# Patient Record
Sex: FEMALE | Race: BLACK OR AFRICAN AMERICAN | Employment: OTHER | URBAN - METROPOLITAN AREA
[De-identification: names, ages, dates, MRNs, and addresses within clinical notes are randomized per-mention and may not be internally consistent; named-entity substitution may affect disease eponyms.]

---

## 2018-02-08 ENCOUNTER — OFFICE VISIT (OUTPATIENT)
Dept: FAMILY MEDICINE CLINIC | Age: 63
End: 2018-02-08

## 2018-02-08 VITALS
WEIGHT: 172 LBS | TEMPERATURE: 96.6 F | HEART RATE: 85 BPM | DIASTOLIC BLOOD PRESSURE: 55 MMHG | HEIGHT: 62 IN | SYSTOLIC BLOOD PRESSURE: 126 MMHG | BODY MASS INDEX: 31.65 KG/M2 | OXYGEN SATURATION: 95 % | RESPIRATION RATE: 18 BRPM

## 2018-02-08 DIAGNOSIS — Z72.0 TOBACCO ABUSE: ICD-10-CM

## 2018-02-08 DIAGNOSIS — R19.4 CHANGE IN BOWEL HABITS: ICD-10-CM

## 2018-02-08 DIAGNOSIS — E78.5 DYSLIPIDEMIA: ICD-10-CM

## 2018-02-08 DIAGNOSIS — Z71.89 ACP (ADVANCE CARE PLANNING): ICD-10-CM

## 2018-02-08 DIAGNOSIS — K21.9 GASTROESOPHAGEAL REFLUX DISEASE, ESOPHAGITIS PRESENCE NOT SPECIFIED: ICD-10-CM

## 2018-02-08 DIAGNOSIS — Z00.00 ENCOUNTER FOR MEDICARE ANNUAL WELLNESS EXAM: ICD-10-CM

## 2018-02-08 DIAGNOSIS — J45.991 COUGH VARIANT ASTHMA: Primary | ICD-10-CM

## 2018-02-08 DIAGNOSIS — Z11.59 NEED FOR HEPATITIS C SCREENING TEST: ICD-10-CM

## 2018-02-08 DIAGNOSIS — K92.0 HEMATEMESIS, PRESENCE OF NAUSEA NOT SPECIFIED: ICD-10-CM

## 2018-02-08 DIAGNOSIS — I10 ESSENTIAL HYPERTENSION: ICD-10-CM

## 2018-02-08 RX ORDER — ATORVASTATIN CALCIUM 20 MG/1
20 TABLET, FILM COATED ORAL DAILY
Qty: 30 TAB | Refills: 2 | COMMUNITY
Start: 2018-02-08 | End: 2018-08-13

## 2018-02-08 RX ORDER — ASPIRIN 81 MG/1
81 TABLET ORAL
COMMUNITY

## 2018-02-08 RX ORDER — MONTELUKAST SODIUM 10 MG/1
10 TABLET ORAL DAILY
Qty: 30 TAB | Refills: 0 | Status: SHIPPED | OUTPATIENT
Start: 2018-02-08 | End: 2018-10-02

## 2018-02-08 NOTE — PROGRESS NOTES
Chief Complaint   Patient presents with   [facility]    Chest Pain     on and off chest pain (burning sensation)    Annual Wellness Visit    Constipation     1. Have you been to the ER, urgent care clinic since your last visit? Hospitalized since your last visit? No    2. Have you seen or consulted any other health care providers outside of the [address] [place] [address] since your last visit? Include any pap smears or colon screening. No        This is an Initial Medicare Annual Wellness Exam (AWV) (Performed 12 months after IPPE or effective date of Medicare Part B enrollment, Once in a lifetime)    I have reviewed the patient's medical history in detail and updated the computerized patient record. History   Marbin London comes in for Medicare wellness visit. Past Medical History:   Diagnosis Date    Acid reflux     Anemia     COPD (chronic obstructive pulmonary disease) (HCC)     Cystic disease of kidney     GERD (gastroesophageal reflux disease)     Goiter     Hemorrhoid     History of blood transfusion     HTN (hypertension)     Hyperlipidemia     Hypertension     Renal cyst       Past Surgical History:   Procedure Laterality Date    HX COLONOSCOPY      HX ENDOSCOPY      HX HEENT  2011    Partial thyroid    HX HYSTERECTOMY      HX ORTHOPAEDIC  2005    left shoulder sugery    HX PARTIAL HYSTERECTOMY  1990    HX THYROIDECTOMY       Current Outpatient Prescriptions   Medication Sig Dispense Refill    aspirin delayed-release 81 mg tablet 81 mg.      multivitamin (ONE A DAY) tablet Take 1 Tab by mouth daily.  pravastatin (PRAVACHOL) 10 mg tablet Take  by mouth nightly.  albuterol (PROVENTIL HFA, VENTOLIN HFA, PROAIR HFA) 90 mcg/actuation inhaler Take  by inhalation.  omeprazole (PRILOSEC) 20 mg capsule Take 20 mg by mouth daily.  lisinopril (PRINIVIL, ZESTRIL) 10 mg tablet Take  by mouth daily.       cholecalciferol (VITAMIN D3) 1,000 unit tablet Take  by mouth daily. Allergies   Allergen Reactions    Latex, Natural Rubber Rash     Family History   Problem Relation Age of Onset    Diabetes Mother     Hypertension Mother    [facility] Cancer Father     Hypertension Father      Social History   Substance Use Topics    Smoking status: Current Every Day Smoker     Packs/day: 0.30     Years: 27.00     Types: Cigarettes    Smokeless tobacco: Never Used    Alcohol use No     Patient Active Problem List   Diagnosis Code    HTN (hypertension) I10    Vomiting R11.10    Hemorrhoids K64.9    Back pain M54.9    GERD (gastroesophageal reflux disease) K21.9    H/O thyroid nodule Z86.39    Renal cyst, left N28.1       Depression Risk Factor Screening:     PHQ over the last two weeks 2/8/2018   Little interest or pleasure in doing things Not at all   Feeling down, depressed or hopeless Not at all   Total Score PHQ 2 0     Alcohol Risk Factor Screening: You do not drink alcohol or very rarely. Functional Ability and Level of Safety:   1. Was the patient's timed Up and GO test unsteady or longer than 30 seconds? No  2. Does the patient need help with the phone, transportation, shopping, preparing meals, housework, laundry, medications or managing money? No  3. Does the patients' home have rugs in the hallway, lack grab bars in the bathroom, lack handrails on the stairs or have poor lighting? No  4. Have you noticed any hearing difficulties? No  Hearing Evaluation:    Hearing Loss  Hearing is good. Activities of Daily Living  The home contains: no safety equipment. Patient does total self care    Fall Risk  Fall Risk Assessment, last 12 mths 2/8/2018   Able to walk? Yes   Fall in past 12 months?  No       Abuse Screen  Patient is not abused    Cognitive Screening   Evaluation of Cognitive Function:  Has your family/caregiver stated any concerns about your memory: no  Normal    Patient Care Team   Patient Care Team:  Angelica Rincon MD as PCP - General (Family Practice)  Lizbeth Marroquin MD (Family Practice)    Assessment/Plan   Education and counseling provided:  Are appropriate based on today's review and evaluation  End-of-Life planning (with patient's consent)  Pneumococcal Vaccine  Screening Mammography  Screening Pap and pelvic (covered once every 2 years)  Colorectal cancer screening tests  Cardiovascular screening blood test  Bone mass measurement (DEXA)  Screening for glaucoma  Diabetes screening test    1. Encounter for Medicare annual wellness exam    2. ACP (advance care planning)    3. Need for hepatitis C screening test  - HEPATITIS C AB; Future    Health Maintenance Due   Topic Date Due    Hepatitis C Screening  1955    Pneumococcal 19-64 Medium Risk (1 of 1 - PPSV23) 01/27/1973    DTaP/Tdap/Td series (1 - Tdap) 01/27/1975    PAP AKA CERVICAL CYTOLOGY  01/27/1975    FOBT Q 1 YEAR AGE 50-75  01/27/2004    ZOSTER VACCINE AGE 60>  11/27/2013   I have discussed the diagnosis with the patient and the intended plan of care as seen in the above orders. The patient has received an after-visit summary and questions were answered concerning future plans. I have discussed medication, side effects, and warnings with the patient in detail. The patient verbalized understanding and is in agreement with the plan of care. The patient will contact the office with any additional concerns. Personalized preventative plan of care was discussed, printed and given to patient.     Kuldip Ramirez MD

## 2018-02-08 NOTE — PATIENT INSTRUCTIONS
Medicare Part B Preventive Services Limitations Recommendation Scheduled   Bone Mass Measurement  (age 72 & older, biennial) Requires diagnosis related to osteoporosis or estrogen deficiency. Biennial benefit unless patient has history of long-term glucocorticoid tx or baseline is needed because initial test was by other method Due    Cardiovascular Screening Blood Tests (every 5 years)  Total cholesterol, HDL, Triglycerides Order as a panel if possible Due    Colorectal Cancer Screening  -Fecal occult blood test (annual)  -Flexible sigmoidoscopy (5y)  -Screening colonoscopy (10y)  -Barium Enema  Due Per Patient-Colonoscopy scheduled for next week with [facility]   Counseling to Prevent Tobacco Use (up to 8 sessions per year)  - Counseling greater than 3 and up to 10 minutes  - Counseling greater than 10 minutes Patients must be asymptomatic of tobacco-related conditions to receive as preventive service Patient is a current everyday smoker. Counseling provided by pcp at this visit. Diabetes Screening Tests (at least every 3 years, Medicare covers annually or at 6-month intervals for prediabetic patients)    Fasting blood sugar (FBS) or glucose tolerance test (GTT) Patient must be diagnosed with one of the following:  -Hypertension, Dyslipidemia, obesity, previous impaired FBS or GTT  Or any two of the following: overweight, FH of diabetes, age ? 72, history of gestational diabetes, birth of baby weighing more than 9 pounds Due    Diabetes Self-Management Training (DSMT) (no USPSTF recommendation) Requires referral by treating physician for patient with diabetes or renal disease. 10 hours of initial DSMT session of no less than 30 minutes each in a continuous 12-month period. 2 hours of follow-up DSMT in subsequent years.  N/A    Glaucoma Screening (no USPSTF recommendation) Diabetes mellitus, family history, , age 48 or over,  American, age 72 or over Due    Human Immunodeficiency Virus (HIV) Screening (annually for increased risk patients)  HIV-1 and HIV-2 by EIA, NATALY, rapid antibody test, or oral mucosa transudate Patient must be at increased risk for HIV infection per USPSTF guidelines or pregnant. Tests covered annually for patients at increased risk. Pregnant patients may receive up to 3 test during pregnancy. N/A    Medical Nutrition Therapy (MNT) (for diabetes or renal disease not recommended schedule) Requires referral by treating physician for patient with diabetes or renal disease. Can be provided in same year as diabetes self-management training (DSMT), and CMS recommends medical nutrition therapy take place after DSMT. Up to 3 hours for initial year and 2 hours in subsequent years. N/A    Shingles Vaccination A shingles vaccine is also recommended once in a lifetime after age 61 Due    Seasonal Influenza Vaccination (annually)  UTD    Pneumococcal Vaccination (once after 72)  Due    Hepatitis B Vaccinations (if medium/high risk) Medium/high risk factors:  End-stage renal disease,  Hemophiliacs who received Factor VIII or IX concentrates, Clients of institutions for the mentally retarded, Persons who live in the same house as a HepB virus carrier, Homosexual men, Illicit injectable drug abusers. N/A    Screening Mammography (biennial age 54-69) Annually (age 36 or over) Due    Screening Pap Tests and Pelvic Examination (up to age 79 and after 79 if unknown history or abnormal study last 10 years) Every 25 months except high risk Due    Ultrasound Screening for Abdominal Aortic Aneurysm (AAA) (once) Patient must be referred through [facility] and not have had a screening for abdominal aortic aneurysm before under Medicare.   Limited to patients who meet one of the following criteria:  - Men who are 73-68 years old and have smoked more than 100 cigarettes in their lifetime.  -Anyone with a FH of AAA  -Anyone recommended for screening by USPSTF N/A

## 2018-02-08 NOTE — PROGRESS NOTES
FLORINDA Ontiveros comes in to establish care. Hypertension: Patient has a previous history of hypertension. She was started on lisinopril but medication had to be gradually cut back as she was getting hypotensive episodes. She has not taken her blood pressure medications for more than 3 days. She was on lisinopril 2.5 mg daily. Her blood pressure today is stable. We discussed blood pressure control and medications. She would want to be off medications for now and do lifestyle modification and keep a blood pressure log. She will come in for follow-up in a month. If blood pressure is elevated she is willing to restart medication but for now she would prefer to stay off it. Cough: Patient has cough, shortness of breath and occasional wheeze. She is scheduled to see a pulmonologist for testing which includes PFTs and radiological tests. She does states that a few weeks ago she was seen by a provider and a chest x-ray was done that showed features of pneumonia. She was put on antibiotic. Symptoms have improved though she still has wheezing at bedtime and occasional chest tightness with pleuritic chest discomfort. No diaphoresis. Pain does not radiate. She is using her inhaler medication. She has albuterol. I will add Singulair to take daily. She can take over-the-counter cough medication for symptom relief as needed. Hematemesis: Patient does state that she has had several episodes of hematemesis. This has been ongoing for months. It is triggered by meals. She does have occasional epigastric discomfort. Denies weight loss. She is taking Prilosec. I will refer her to the gastroenterologist given she is having episodes of hematemesis. She will need to have an EGD done. We will check labs today. Last time she had an episode of hematemesis was 2 days ago. She denies dizziness or palpitations. Change in bowel habits: Patient has noted change in her bowel habits.   States that this has been ongoing for years. Her stools are infrequent and at times she does get gas and bloating. She is scheduled to have colonoscopy done in a few weeks. We will get the report and her previous records. She will discuss this with the gastroenterologist.  Tobacco abuse: Patient is a smoker. Has smoked for more than 40 years. We discussed the need to quit smoking and that she is aware of the dangers of tobacco use. She is not ready to quit at the moment. She does have family member who  of lung cancer thus she states that she is well aware that she needs to quit smoking. We will continue to encourage her to quit. She does not want any nicotine replacement therapy or medications. Past Medical History  Past Medical History:   Diagnosis Date    Acid reflux     Anemia     COPD (chronic obstructive pulmonary disease) (HCC)     Cystic disease of kidney     GERD (gastroesophageal reflux disease)     Goiter     Hemorrhoid     History of blood transfusion     HTN (hypertension)     Hyperlipidemia     Hypertension     Renal cyst        Surgical History  Past Surgical History:   Procedure Laterality Date    HX COLONOSCOPY      HX ENDOSCOPY      HX HEENT      Partial thyroid    HX HYSTERECTOMY      HX ORTHOPAEDIC      left shoulder sugery    HX PARTIAL HYSTERECTOMY      HX THYROIDECTOMY          Medications  Current Outpatient Prescriptions   Medication Sig Dispense Refill    aspirin delayed-release 81 mg tablet 81 mg.      atorvastatin (LIPITOR) 20 mg tablet Take 1 Tab by mouth daily. 30 Tab 2    montelukast (SINGULAIR) 10 mg tablet Take 1 Tab by mouth daily. 30 Tab 0    multivitamin (ONE A DAY) tablet Take 1 Tab by mouth daily.  albuterol (PROVENTIL HFA, VENTOLIN HFA, PROAIR HFA) 90 mcg/actuation inhaler Take  by inhalation.  omeprazole (PRILOSEC) 20 mg capsule Take 20 mg by mouth daily.  cholecalciferol (VITAMIN D3) 1,000 unit tablet Take  by mouth daily. Allergies  Allergies   Allergen Reactions    Latex, Natural Rubber Rash       Family History  Family History   Problem Relation Age of Onset    Diabetes Mother     Hypertension Mother     Cancer Father     Hypertension Father        Social History  Social History     Social History    Marital status: SINGLE     Spouse name: N/A    Number of children: N/A    Years of education: N/A     Occupational History    Not on file.      Social History Main Topics    Smoking status: Current Every Day Smoker     Packs/day: 0.30     Years: 27.00     Types: Cigarettes    Smokeless tobacco: Never Used    Alcohol use No    Drug use: No    Sexual activity: No     Other Topics Concern    Not on file     Social History Narrative    ** Merged History Encounter **            Review of Systems  Review of Systems - History obtained from the patient  General ROS: negative for - chills or fever  Psychological ROS: positive for - concentration difficulties  ENT ROS: negative  Allergy and Immunology ROS: positive for - seasonal allergies  Hematological and Lymphatic ROS: negative  Endocrine ROS: negative  Respiratory ROS: positive for - cough, pleuritic pain, shortness of breath and wheezing  negative for - hemoptysis or sputum changes  Cardiovascular ROS: no chest pain or dyspnea on exertion  Gastrointestinal ROS: positive for - change in bowel habits, constipation and hematemesis  Genito-Urinary ROS: no dysuria, trouble voiding, or hematuria  Musculoskeletal ROS: negative  Neurological ROS: no TIA or stroke symptoms    Vital Signs  Visit Vitals    /55 (BP 1 Location: Left arm, BP Patient Position: Sitting)    Pulse 85    Temp 96.6 °F (35.9 °C) (Oral)    Resp 18    Ht 5' 2\" (1.575 m)    Wt 172 lb (78 kg)    LMP 05/08/1981    SpO2 95%    BMI 31.46 kg/m2         Physical Exam  Physical Examination: General appearance - oriented to person, place, and time, acyanotic, in no respiratory distress and well hydrated  Mental status - alert, oriented to person, place, and time, affect appropriate to mood  Eyes - sclera anicteric  Nose - normal and patent, no erythema, discharge or polyps  Mouth - mucous membranes moist, pharynx normal without lesions  Neck - supple, no significant adenopathy  Lymphatics - no palpable lymphadenopathy, no hepatosplenomegaly  Chest - clear to auscultation, no wheezes, rales or rhonchi, symmetric air entry, no tachypnea, retractions or cyanosis  Heart - normal rate and regular rhythm, S1 and S2 normal  Abdomen - soft, nontender, nondistended, no masses or organomegaly  bowel sounds normal  Neurological - alert, oriented, normal speech, no focal findings or movement disorder noted, motor and sensory grossly normal bilaterally  Musculoskeletal - no muscular tenderness noted  Extremities - no pedal edema noted, intact peripheral pulses    Results  Results for orders placed or performed in visit on 05/23/16   AMB POC URINALYSIS DIP STICK AUTO W/ MICRO (MICRO RESULTS)   Result Value Ref Range    Color (UA POC) Yellow     Clarity (UA POC) Clear     Glucose (UA POC) Negative Negative    Bilirubin (UA POC) Negative Negative    Ketones (UA POC) Negative Negative    Specific gravity (UA POC) 1.020 1.001 - 1.035    Blood (UA POC) Trace Negative    pH (UA POC) 6.5 4.6 - 8.0    Protein (UA POC) Negative Negative mg/dL    Urobilinogen (UA POC) 0.2 mg/dL 0.2 - 1    Nitrites (UA POC) Negative Negative    Leukocyte esterase (UA POC) Negative Negative    Epithelial cells (UA POC)      WBCs (UA POC) n     RBCs (UA POC) n     Bacteria (UA POC) n Negative    Crystals (UA POC)  Negative    Other (UA POC)         ASSESSMENT and PLAN  1. Cough variant asthma  - montelukast (SINGULAIR) 10 mg tablet; Take 1 Tab by mouth daily. Dispense: 30 Tab; Refill: 0    2. Essential hypertension  - CBC WITH AUTOMATED DIFF; Future  - METABOLIC PANEL, COMPREHENSIVE; Future    3.  Dyslipidemia  - atorvastatin (LIPITOR) 20 mg tablet; Take 1 Tab by mouth daily. Dispense: 30 Tab; Refill: 2  - LIPID PANEL; Future    4. Need for hepatitis C screening test  - HEPATITIS C AB; Future    5. Tobacco abuse    6. Hematemesis, presence of nausea not specified  - REFERRAL TO GASTROENTEROLOGY    7. Gastroesophageal reflux disease, esophagitis presence not specified  - REFERRAL TO GASTROENTEROLOGY    lab results and schedule of future lab studies reviewed with patient  reviewed diet, exercise and weight control  very strongly urged to quit smoking to reduce cardiovascular risk  cardiovascular risk and specific lipid/LDL goals reviewed  reviewed medications and side effects in detail  use of aspirin to prevent MI and TIA's discussed    I have discussed the diagnosis with the patient and the intended plan of care as seen in the above orders. The patient has received an after-visit summary and questions were answered concerning future plans. I have discussed medication, side effects, and warnings with the patient in detail. The patient verbalized understanding and is in agreement with the plan of care. The patient will contact the office with any additional concerns.     Megan Whitehead MD

## 2018-02-08 NOTE — MR AVS SNAPSHOT
[facility] [address] [facility] 
[phone] Patient: Marbin London MRN: [id number] XAK:[id number]/62/0442 Visit Information Date & Time Provider Department Dept. Phone Encounter #  
 2/8/2018 10:00 AM Robbie Graff MD [facility] [phone] [id number] Follow-up Instructions Return in about 1 month (around 3/8/2018), or if symptoms worsen or fail to improve, for htn, gerd, dyslipidemia. Upcoming Health Maintenance Date Due Hepatitis C Screening 1955 Pneumococcal 19-64 Medium Risk (1 of 1 - PPSV23) 1/27/1973 DTaP/Tdap/Td series (1 - Tdap) 1/27/1975 PAP AKA CERVICAL CYTOLOGY 1/27/1975 FOBT Q 1 YEAR AGE 50-75 1/27/2004 ZOSTER VACCINE AGE 60> 11/27/2013 BREAST CANCER SCRN MAMMOGRAM 11/28/2018 Allergies as of 2/8/2018  Review Complete On: 2/8/2018 By: Bean Gong MD  
  
 Severity Noted Reaction Type Reactions Latex, Natural Rubber  07/16/2013   Side Effect Rash Current Immunizations  Reviewed on 1/15/2014 Name Date Influenza Vaccine 1/8/2014  8:24 AM  
  
 Not reviewed this visit You Were Diagnosed With   
  
 Codes Comments Cough variant asthma    -  Primary ICD-10-CM: S39.891 ICD-9-CM: 991.46 Essential hypertension     ICD-10-CM: I10 
ICD-9-CM: 401.9 Dyslipidemia     ICD-10-CM: E78.5 ICD-9-CM: 272.4 Need for hepatitis C screening test     ICD-10-CM: Z11.59 
ICD-9-CM: V73.89 Tobacco abuse     ICD-10-CM: Z72.0 ICD-9-CM: 305.1 Hematemesis, presence of nausea not specified     ICD-10-CM: K92.0 ICD-9-CM: 578.0 Gastroesophageal reflux disease, esophagitis presence not specified     ICD-10-CM: K21.9 ICD-9-CM: 530.81 Change in bowel habits     ICD-10-CM: R19.4 ICD-9-CM: 787.99 Vitals BP Pulse Temp Resp Height(growth percentile) Weight(growth percentile) 126/55 (BP 1 Location: Left arm, BP Patient Position: Sitting) 85 96.6 °F (35.9 °C) (Oral) 18 5' 2\" (1.575 m) 172 lb (78 kg) LMP SpO2 BMI OB Status Smoking Status 05/08/1981 95% 31.46 kg/m2 Hysterectomy Current Every Day Smoker Vitals History BMI and BSA Data Body Mass Index Body Surface Area  
 31.46 kg/m 2 1.85 m 2 Preferred Pharmacy Pharmacy Name Phone [address], [facility] Your Updated Medication List  
  
   
This list is accurate as of: 2/8/18 10:50 AM.  Always use your most recent med list.  
  
  
  
  
 albuterol 90 mcg/actuation inhaler Commonly known as:  PROVENTIL HFA, VENTOLIN HFA, PROAIR HFA Take  by inhalation. aspirin delayed-release 81 mg tablet 81 mg.  
  
 atorvastatin 20 mg tablet Commonly known as:  LIPITOR Take 1 Tab by mouth daily. montelukast 10 mg tablet Commonly known as:  SINGULAIR Take 1 Tab by mouth daily. multivitamin tablet Commonly known as:  ONE A DAY Take 1 Tab by mouth daily. omeprazole 20 mg capsule Commonly known as:  PRILOSEC Take 20 mg by mouth daily. VITAMIN D3 1,000 unit tablet Generic drug:  cholecalciferol Take  by mouth daily. Prescriptions Sent to Pharmacy Refills  
 montelukast (SINGULAIR) 10 mg tablet 0 Sig: Take 1 Tab by mouth daily. Class: Normal  
 Pharmacy: [facility] [address] [facility] [address] [place] [address] [address] Ph #: [phone] Route: Oral  
  
We Performed the Following REFERRAL TO GASTROENTEROLOGY [UDN94 Custom] Comments:  
 Patient with hematemesis on and off. Follow-up Instructions Return in about 1 month (around 3/8/2018), or if symptoms worsen or fail to improve, for htn, gerd, dyslipidemia. To-Do List   
 02/08/2018 Lab:  CBC WITH AUTOMATED DIFF   
  
 02/08/2018 Lab:  HEPATITIS C AB   
  
 02/08/2018 Lab:  LIPID PANEL   
  
 02/08/2018 Lab: METABOLIC PANEL, COMPREHENSIVE Referral Information Referral ID Referred By Referred To  
  
 [id number] Dakota Vallecillo N Not Available Visits Status Start Date End Date 1 New Request 2/8/18 2/8/19 If your referral has a status of pending review or denied, additional information will be sent to support the outcome of this decision. Patient Instructions Medicare Part B Preventive Services Limitations Recommendation Scheduled Bone Mass Measurement 
(age 72 & older, biennial) Requires diagnosis related to osteoporosis or estrogen deficiency. Biennial benefit unless patient has history of long-term glucocorticoid tx or baseline is needed because initial test was by other method Due   
Cardiovascular Screening Blood Tests (every 5 years) Total cholesterol, HDL, Triglycerides Order as a panel if possible Due Colorectal Cancer Screening 
-Fecal occult blood test (annual) -Flexible sigmoidoscopy (5y) 
-Screening colonoscopy (10y) -Barium Enema  Due Per Patient-Colonoscopy scheduled for next week with [facility]  
Counseling to Prevent Tobacco Use (up to 8 sessions per year) - Counseling greater than 3 and up to 10 minutes - Counseling greater than 10 minutes Patients must be asymptomatic of tobacco-related conditions to receive as preventive service Patient is a current everyday smoker. Counseling provided by pcp at this visit. Diabetes Screening Tests (at least every 3 years, Medicare covers annually or at 6-month intervals for prediabetic patients) Fasting blood sugar (FBS) or glucose tolerance test (GTT) Patient must be diagnosed with one of the following: 
-Hypertension, Dyslipidemia, obesity, previous impaired FBS or GTT 
Or any two of the following: overweight, FH of diabetes, age ? 72, history of gestational diabetes, birth of baby weighing more than 9 pounds Due Diabetes Self-Management Training (DSMT) (no USPSTF recommendation) Requires referral by treating physician for patient with diabetes or renal disease. 10 hours of initial DSMT session of no less than 30 minutes each in a continuous 12-month period. 2 hours of follow-up DSMT in subsequent years. N/A Glaucoma Screening (no USPSTF recommendation) Diabetes mellitus, family history, , age 48 or over,  American, age 72 or over Due Human Immunodeficiency Virus (HIV) Screening (annually for increased risk patients) HIV-1 and HIV-2 by EIA, NATALY, rapid antibody test, or oral mucosa transudate Patient must be at increased risk for HIV infection per USPSTF guidelines or pregnant. Tests covered annually for patients at increased risk. Pregnant patients may receive up to 3 test during pregnancy. N/A Medical Nutrition Therapy (MNT) (for diabetes or renal disease not recommended schedule) Requires referral by treating physician for patient with diabetes or renal disease. Can be provided in same year as diabetes self-management training (DSMT), and CMS recommends medical nutrition therapy take place after DSMT. Up to 3 hours for initial year and 2 hours in subsequent years. N/A Shingles Vaccination A shingles vaccine is also recommended once in a lifetime after age 61 Due Seasonal Influenza Vaccination (annually)  UTD Pneumococcal Vaccination (once after 65)  Due   
Hepatitis B Vaccinations (if medium/high risk) Medium/high risk factors:  End-stage renal disease, Hemophiliacs who received Factor VIII or IX concentrates, Clients of institutions for the mentally retarded, Persons who live in the same house as a HepB virus carrier, Homosexual men, Illicit injectable drug abusers. N/A Screening Mammography (biennial age 54-69) Annually (age 36 or over) Due   
Screening Pap Tests and Pelvic Examination (up to age 79 and after 79 if unknown history or abnormal study last 10 years) Every 24 months except high risk Due   
 Ultrasound Screening for Abdominal Aortic Aneurysm (AAA) (once) Patient must be referred through IPPE and not have had a screening for abdominal aortic aneurysm before under Medicare. Limited to patients who meet one of the following criteria: 
- Men who are 73-68 years old and have smoked more than 100 cigarettes in their lifetime. 
-Anyone with a FH of AAA 
-Anyone recommended for screening by USPSTF N/A Introducing [facility]! Medardo Piña introduces [employer] patient portal. Now you can access parts of your medical record, email your doctor's office, and request medication refills online. 1. In your internet browser, go to [email or web address] 2. Click on the First Time User? Click Here link in the Sign In box. You will see the New Member Sign Up page. 3. Enter your [employer] Access Code exactly as it appears below. You will not need to use this code after youve completed the sign-up process. If you do not sign up before the expiration date, you must request a new code. · [employer] Access Code: [email or web address] Expires: 5/9/2018 10:50 AM 
 
4. Enter the last four digits of your Social Security Number (xxxx) and Date of Birth (mm/dd/yyyy) as indicated and click Submit. You will be taken to the next sign-up page. 5. Create a [employer] ID. This will be your [employer] login ID and cannot be changed, so think of one that is secure and easy to remember. 6. Create a [employer] password. You can change your password at any time. 7. Enter your Password Reset Question and Answer. This can be used at a later time if you forget your password. 8. Enter your e-mail address. You will receive e-mail notification when new information is available in [address] 9. Click Sign Up. You can now view and download portions of your medical record. 10. Click the Download Summary menu link to download a portable copy of your medical information. If you have questions, please visit the Frequently Asked Questions section of the [employer] website. Remember, [employer] is NOT to be used for urgent needs. For medical emergencies, dial 911. Now available from your iPhone and Android! Please provide this summary of care documentation to your next provider. Your primary care clinician is listed as Allison Diaz. If you have any questions after today's visit, please call [phone].

## 2018-02-08 NOTE — ACP (ADVANCE CARE PLANNING)
Advance Care Planning (ACP) Provider Note - Comprehensive     Date of ACP Conversation: 02/08/18  Persons included in Conversation:  patient  Length of ACP Conversation in minutes:  16 minutes    Authorized Decision Maker (if patient is incapable of making informed decisions): This person is:  Patient states that 1 of her sons would be her authorized decision maker. She does not know which one. I encouraged her to talk to them and then decide who would be her authorized decision maker. She will fill out forms given and bring these in for scanning into the EMR chart. General ACP for ALL Patients with Decision Making Capacity:   Importance of advance care planning, including choosing a healthcare agent to communicate patient's healthcare decisions if patient lost the ability to make decisions, such as after a sudden illness or accident  Understanding of the healthcare agent role was assessed and information provided  Exploration of values, goals, and preferences if recovery is not expected, even with continued medical treatment in the event of: Imminent death  Severe, permanent brain injury  \"In these circumstances, what matters most to you? \"  Care focused more on comfort or quality of life.   Opportunity offered to explore how cultural, Taoism, spiritual, or personal beliefs would affect decisions for future care     Interventions Provided:  Recommended completion of Advance Directive form after review of ACP materials and conversation with prospective healthcare agent      Robbie Root MD

## 2018-02-09 ENCOUNTER — HOSPITAL ENCOUNTER (OUTPATIENT)
Dept: LAB | Age: 63
Discharge: HOME | End: 2018-02-09
Payer: MEDICARE

## 2018-02-09 ENCOUNTER — LAB ONLY (OUTPATIENT)
Dept: FAMILY MEDICINE CLINIC | Age: 63
End: 2018-02-09

## 2018-02-09 DIAGNOSIS — E78.5 DYSLIPIDEMIA: ICD-10-CM

## 2018-02-09 DIAGNOSIS — I10 ESSENTIAL HYPERTENSION: Primary | ICD-10-CM

## 2018-02-09 DIAGNOSIS — E78.5 HYPERLIPIDEMIA, UNSPECIFIED HYPERLIPIDEMIA TYPE: ICD-10-CM

## 2018-02-09 DIAGNOSIS — Z11.59 NEED FOR HEPATITIS C SCREENING TEST: ICD-10-CM

## 2018-02-09 DIAGNOSIS — I10 ESSENTIAL HYPERTENSION: ICD-10-CM

## 2018-02-09 LAB
ALBUMIN SERPL-MCNC: 4.3 G/DL (ref 3.4–5)
ALBUMIN/GLOB SERPL: 1.3 {RATIO} (ref 0.8–1.7)
ALP SERPL-CCNC: 84 U/L (ref 45–117)
ALT SERPL-CCNC: 29 U/L (ref 13–56)
ANION GAP SERPL CALC-SCNC: 7 MMOL/L (ref 3–18)
AST SERPL-CCNC: 18 U/L (ref 15–37)
BASOPHILS # BLD: 0.1 K/UL (ref 0–0.06)
BASOPHILS NFR BLD: 1 % (ref 0–2)
BILIRUB SERPL-MCNC: 0.6 MG/DL (ref 0.2–1)
BUN SERPL-MCNC: 15 MG/DL (ref 7–18)
BUN/CREAT SERPL: 21 (ref 12–20)
CALCIUM SERPL-MCNC: 9.6 MG/DL (ref 8.5–10.1)
CHLORIDE SERPL-SCNC: 103 MMOL/L (ref 100–108)
CHOLEST SERPL-MCNC: 153 MG/DL
CO2 SERPL-SCNC: 29 MMOL/L (ref 21–32)
CREAT SERPL-MCNC: 0.72 MG/DL (ref 0.6–1.3)
DIFFERENTIAL METHOD BLD: ABNORMAL
EOSINOPHIL # BLD: 0.3 K/UL (ref 0–0.4)
EOSINOPHIL NFR BLD: 4 % (ref 0–5)
ERYTHROCYTE [DISTWIDTH] IN BLOOD BY AUTOMATED COUNT: 13.6 % (ref 11.6–14.5)
GLOBULIN SER CALC-MCNC: 3.4 G/DL (ref 2–4)
GLUCOSE SERPL-MCNC: 88 MG/DL (ref 74–99)
HCT VFR BLD AUTO: 40.3 % (ref 35–45)
HDLC SERPL-MCNC: 51 MG/DL (ref 40–60)
HDLC SERPL: 3 {RATIO} (ref 0–5)
HGB BLD-MCNC: 12.6 G/DL (ref 12–16)
LDLC SERPL CALC-MCNC: 87.2 MG/DL (ref 0–100)
LIPID PROFILE,FLP: NORMAL
LYMPHOCYTES # BLD: 2.4 K/UL (ref 0.9–3.6)
LYMPHOCYTES NFR BLD: 29 % (ref 21–52)
MCH RBC QN AUTO: 28.5 PG (ref 24–34)
MCHC RBC AUTO-ENTMCNC: 31.3 G/DL (ref 31–37)
MCV RBC AUTO: 91.2 FL (ref 74–97)
MONOCYTES # BLD: 0.6 K/UL (ref 0.05–1.2)
MONOCYTES NFR BLD: 7 % (ref 3–10)
NEUTS SEG # BLD: 4.8 K/UL (ref 1.8–8)
NEUTS SEG NFR BLD: 59 % (ref 40–73)
PLATELET # BLD AUTO: 334 K/UL (ref 135–420)
PMV BLD AUTO: 11 FL (ref 9.2–11.8)
POTASSIUM SERPL-SCNC: 4.2 MMOL/L (ref 3.5–5.5)
PROT SERPL-MCNC: 7.7 G/DL (ref 6.4–8.2)
RBC # BLD AUTO: 4.42 M/UL (ref 4.2–5.3)
SODIUM SERPL-SCNC: 139 MMOL/L (ref 136–145)
TRIGL SERPL-MCNC: 74 MG/DL (ref ?–150)
VLDLC SERPL CALC-MCNC: 14.8 MG/DL
WBC # BLD AUTO: 8.1 K/UL (ref 4.6–13.2)

## 2018-02-09 PROCEDURE — 86803 HEPATITIS C AB TEST: CPT | Performed by: FAMILY MEDICINE

## 2018-02-09 PROCEDURE — 85025 COMPLETE CBC W/AUTO DIFF WBC: CPT | Performed by: FAMILY MEDICINE

## 2018-02-09 PROCEDURE — 80061 LIPID PANEL: CPT | Performed by: FAMILY MEDICINE

## 2018-02-09 PROCEDURE — 80053 COMPREHEN METABOLIC PANEL: CPT | Performed by: FAMILY MEDICINE

## 2018-02-09 NOTE — PROGRESS NOTES
Patient in today for fasting labs. Venipuncture done on patient's right arm. Patient tolerated it well.     Labs Collected:  CBC  Lipid  CMP  Hep C

## 2018-02-12 LAB
HCV AB SER IA-ACNC: 0.11 INDEX
HCV AB SERPL QL IA: NEGATIVE
HCV COMMENT,HCGAC: NORMAL

## 2018-03-14 ENCOUNTER — TELEPHONE (OUTPATIENT)
Dept: FAMILY MEDICINE CLINIC | Age: 63
End: 2018-03-14

## 2018-03-14 NOTE — TELEPHONE ENCOUNTER
Patient called regarding a question about taking Daily Probiotics. Wanted to know how to take it or if it is ok to take.  You can either call her back or she doesn't mind coming by tomorrow to ask that question by a nurse

## 2018-03-19 ENCOUNTER — OFFICE VISIT (OUTPATIENT)
Dept: FAMILY MEDICINE CLINIC | Age: 63
End: 2018-03-19

## 2018-03-19 ENCOUNTER — TELEPHONE (OUTPATIENT)
Dept: FAMILY MEDICINE CLINIC | Age: 63
End: 2018-03-19

## 2018-03-19 ENCOUNTER — HOSPITAL ENCOUNTER (OUTPATIENT)
Dept: LAB | Age: 63
Discharge: HOME | End: 2018-03-19
Payer: MEDICARE

## 2018-03-19 VITALS
SYSTOLIC BLOOD PRESSURE: 137 MMHG | HEART RATE: 88 BPM | TEMPERATURE: 96.5 F | OXYGEN SATURATION: 95 % | HEIGHT: 62 IN | RESPIRATION RATE: 18 BRPM | DIASTOLIC BLOOD PRESSURE: 78 MMHG | WEIGHT: 171 LBS | BODY MASS INDEX: 31.47 KG/M2

## 2018-03-19 DIAGNOSIS — J18.9 PNEUMONIA DUE TO INFECTIOUS ORGANISM, UNSPECIFIED LATERALITY, UNSPECIFIED PART OF LUNG: ICD-10-CM

## 2018-03-19 DIAGNOSIS — K21.9 GASTROESOPHAGEAL REFLUX DISEASE, ESOPHAGITIS PRESENCE NOT SPECIFIED: ICD-10-CM

## 2018-03-19 DIAGNOSIS — R42 DIZZINESS: Primary | ICD-10-CM

## 2018-03-19 DIAGNOSIS — R09.81 SINUS CONGESTION: ICD-10-CM

## 2018-03-19 DIAGNOSIS — H93.13 TINNITUS OF BOTH EARS: ICD-10-CM

## 2018-03-19 DIAGNOSIS — R42 DIZZINESS: ICD-10-CM

## 2018-03-19 LAB
ALBUMIN SERPL-MCNC: 4.3 G/DL (ref 3.4–5)
ALBUMIN/GLOB SERPL: 1.3 {RATIO} (ref 0.8–1.7)
ALP SERPL-CCNC: 90 U/L (ref 45–117)
ALT SERPL-CCNC: 40 U/L (ref 13–56)
ANION GAP SERPL CALC-SCNC: 10 MMOL/L (ref 3–18)
AST SERPL-CCNC: 18 U/L (ref 15–37)
BASOPHILS # BLD: 0.1 K/UL (ref 0–0.06)
BASOPHILS NFR BLD: 1 % (ref 0–2)
BILIRUB SERPL-MCNC: 0.5 MG/DL (ref 0.2–1)
BILIRUB UR QL STRIP: NEGATIVE
BUN SERPL-MCNC: 15 MG/DL (ref 7–18)
BUN/CREAT SERPL: 16 (ref 12–20)
CALCIUM SERPL-MCNC: 9.7 MG/DL (ref 8.5–10.1)
CHLORIDE SERPL-SCNC: 101 MMOL/L (ref 100–108)
CO2 SERPL-SCNC: 29 MMOL/L (ref 21–32)
CREAT SERPL-MCNC: 0.92 MG/DL (ref 0.6–1.3)
DIFFERENTIAL METHOD BLD: ABNORMAL
EOSINOPHIL # BLD: 0.3 K/UL (ref 0–0.4)
EOSINOPHIL NFR BLD: 4 % (ref 0–5)
ERYTHROCYTE [DISTWIDTH] IN BLOOD BY AUTOMATED COUNT: 13.7 % (ref 11.6–14.5)
GLOBULIN SER CALC-MCNC: 3.4 G/DL (ref 2–4)
GLUCOSE SERPL-MCNC: 90 MG/DL (ref 74–99)
GLUCOSE UR-MCNC: NEGATIVE MG/DL
HCT VFR BLD AUTO: 40.2 % (ref 35–45)
HGB BLD-MCNC: 12.4 G/DL (ref 12–16)
KETONES P FAST UR STRIP-MCNC: NEGATIVE MG/DL
LYMPHOCYTES # BLD: 2.1 K/UL (ref 0.9–3.6)
LYMPHOCYTES NFR BLD: 29 % (ref 21–52)
MCH RBC QN AUTO: 28.7 PG (ref 24–34)
MCHC RBC AUTO-ENTMCNC: 30.8 G/DL (ref 31–37)
MCV RBC AUTO: 93.1 FL (ref 74–97)
MONOCYTES # BLD: 0.5 K/UL (ref 0.05–1.2)
MONOCYTES NFR BLD: 7 % (ref 3–10)
NEUTS SEG # BLD: 4.3 K/UL (ref 1.8–8)
NEUTS SEG NFR BLD: 59 % (ref 40–73)
PH UR STRIP: 6.5 [PH] (ref 4.6–8)
PLATELET # BLD AUTO: 439 K/UL (ref 135–420)
PMV BLD AUTO: 10.7 FL (ref 9.2–11.8)
POTASSIUM SERPL-SCNC: 4.4 MMOL/L (ref 3.5–5.5)
PROT SERPL-MCNC: 7.7 G/DL (ref 6.4–8.2)
PROT UR QL STRIP: NORMAL
RBC # BLD AUTO: 4.32 M/UL (ref 4.2–5.3)
SODIUM SERPL-SCNC: 140 MMOL/L (ref 136–145)
SP GR UR STRIP: 1.02 (ref 1–1.03)
UA UROBILINOGEN AMB POC: NORMAL (ref 0.2–1)
URINALYSIS CLARITY POC: CLEAR
URINALYSIS COLOR POC: YELLOW
URINE BLOOD POC: NEGATIVE
URINE LEUKOCYTES POC: NEGATIVE
URINE NITRITES POC: NEGATIVE
WBC # BLD AUTO: 7.2 K/UL (ref 4.6–13.2)

## 2018-03-19 PROCEDURE — 80053 COMPREHEN METABOLIC PANEL: CPT | Performed by: FAMILY MEDICINE

## 2018-03-19 PROCEDURE — 85025 COMPLETE CBC W/AUTO DIFF WBC: CPT | Performed by: FAMILY MEDICINE

## 2018-03-19 NOTE — PROGRESS NOTES
Chief Complaint   Patient presents with    Follow-up     HTN, GERD, Dyslipemia      1. Have you been to the ER, urgent care clinic since your last visit? Hospitalized since your last visit? Yes When: sunday 03/18/2018 Where: Urgent Care  Reason for visit: Dizziness, Headache, and vomiting     2. Have you seen or consulted any other health care providers outside of the [address] [place] [address] Shadi since your last visit? Include any pap smears or colon screening. No     [facility]  Honorio Foley comes in for follow-up care. Dizziness: Patient has dizziness. Feels dizzy when she gets up to start walking. At times has vertical.  Has not fallen. Denies palpitations or chest pain. No headache or changes in vision. She does have some plugged up sensation in the ears and tinnitus. Would like to be referred to the ENT physician. I will also check labs today. She does have a slight headache. When she is dizzy at times does get some nausea in the she did vomit once. She has taken diclofenac and this does help with the headache. Denies any melena or dark stools. No hematemesis. Pneumonia: Patient recently admitted and treated for pneumonia. She  just completed antibiotic medication a day ago. Denies fever or chills. No cough, wheeze or shortness of breath. No chest pain. I will recheck her CBC today. Patient did have a chest x-ray done that day showed some opacities in the lung fields. We will plan to recheck this in a month. She is followed up by the pulmonologist.  ENT: Patient has ringing in both ears. No hearing loss. Does also have headache. She does have nasal congestion with postnasal drainage. This may be due to allergies. Patient does request referral to see an ENT physician. GERD: Patient was seen recently by the gastroenterologist.  She is on pantoprazole. This did not help as well as the Prilosec. After discussion I will have her continue with the Prilosec.   Will have her follow-up with her gastroenterologist.    Past Medical History  Past Medical History:   Diagnosis Date    Acid reflux     Anemia     COPD (chronic obstructive pulmonary disease) ([facility] [address]     Cystic disease of kidney     GERD (gastroesophageal reflux disease)     Goiter     Hemorrhoid     History of blood transfusion     HTN (hypertension)     Hyperlipidemia     Hypertension     Renal cyst        Surgical History  Past Surgical History:   Procedure Laterality Date    HX COLONOSCOPY      HX ENDOSCOPY      HX HEENT  2011    Partial thyroid    HX HYSTERECTOMY      HX ORTHOPAEDIC  2005    left shoulder sugery    HX PARTIAL HYSTERECTOMY  1990    HX THYROIDECTOMY          Medications  Current Outpatient Prescriptions   Medication Sig Dispense Refill    multivitamin (ONE A DAY) tablet Take 1 Tab by mouth daily.  omeprazole (PRILOSEC) 20 mg capsule Take 20 mg by mouth daily.  cholecalciferol (VITAMIN D3) 1,000 unit tablet Take  by mouth daily.  aspirin delayed-release 81 mg tablet 81 mg.      atorvastatin (LIPITOR) 20 mg tablet Take 1 Tab by mouth daily. 30 Tab 2    montelukast (SINGULAIR) 10 mg tablet Take 1 Tab by mouth daily. 30 Tab 0    albuterol (PROVENTIL HFA, VENTOLIN HFA, PROAIR HFA) 90 mcg/actuation inhaler Take  by inhalation. Allergies  Allergies   Allergen Reactions    Latex, Natural Rubber Rash       Family History  Family History   Problem Relation Age of Onset    Diabetes Mother     Hypertension Mother     Cancer Father     Hypertension Father        Social History  Social History     Social History    Marital status: SINGLE     Spouse name: N/A    Number of children: N/A    Years of education: N/A     Occupational History    Not on file.      Social History Main Topics    Smoking status: Current Every Day Smoker     Packs/day: 0.30     Years: 27.00     Types: Cigarettes    Smokeless tobacco: Never Used    Alcohol use No    Drug use: No    Sexual activity: No     Other Topics Concern    Not on file     Social History Narrative    ** Merged History Encounter **            Review of Systems  Review of Systems - History obtained from chart review and the patient  General ROS: positive for  - fatigue, malaise and sleep disturbance  negative for - chills or fever  Psychological ROS: positive for - behavioral disorder and mood swings  Ophthalmic ROS: negative  ENT ROS: positive for - headaches, tinnitus, nasal congestion, sinus pain, sneezing and vertigo  negative for - epistaxis  Allergy and Immunology ROS: positive for - nasal congestion  Hematological and Lymphatic ROS: negative  Endocrine ROS: negative  Respiratory ROS: no cough, shortness of breath, or wheezing  Recently treated for pneumonia  Cardiovascular ROS: positive for - dyspnea on exertion  negative for - chest pain, edema or orthopnea  Gastrointestinal ROS: positive for - appetite loss, gas/bloating, heartburn and nausea/vomiting  negative for - blood in stools or diarrhea  Genito-Urinary ROS: no dysuria, trouble voiding, or hematuria  Musculoskeletal ROS: positive for - joint pain and muscle pain  Neurological ROS: positive for - headaches    Vital Signs  Visit Vitals    /78 (BP 1 Location: Left arm, BP Patient Position: Sitting)    Pulse 88    Temp 96.5 °F (35.8 °C) (Oral)    Resp 18    Ht 5' 2\" (1.575 m)    Wt 171 lb (77.6 kg)    LMP 05/08/1981    SpO2 95%    BMI 31.28 kg/m2         Physical Exam  Physical Examination: General appearance - oriented to person, place, and time, acyanotic, in no respiratory distress and well hydrated  Mental status - alert, oriented to person, place, and time, affect appropriate to mood  Eyes - pupils equal and reactive, extraocular eye movements intact, sclera anicteric  Ears - bilateral TM's and external ear canals normal, hearing grossly normal bilaterally  Nose - normal and patent, no erythema, discharge or polyps and normal nontender sinuses  Mouth - mucous membranes moist, pharynx normal without lesions  Neck - supple, no significant adenopathy  Lymphatics - no palpable lymphadenopathy, no hepatosplenomegaly  Chest - clear to auscultation, no wheezes, rales or rhonchi, symmetric air entry, no tachypnea, retractions or cyanosis  Heart - S1 and S2 normal  Abdomen - soft, nontender, nondistended, no masses or organomegaly  no rebound tenderness noted  Back exam - limited range of motion  Neurological - motor and sensory grossly normal bilaterally  Musculoskeletal - no joint tenderness, deformity or swelling, no muscular tenderness noted  Extremities - no pedal edema noted, intact peripheral pulses    Results  Results for orders placed or performed in visit on 03/19/18   AMB POC URINALYSIS DIP STICK AUTO W/O MICRO   Result Value Ref Range    Color (UA POC) Yellow     Clarity (UA POC) Clear     Glucose (UA POC) Negative Negative    Bilirubin (UA POC) Negative Negative    Ketones (UA POC) Negative Negative    Specific gravity (UA POC) 1.025 1.001 - 1.035    Blood (UA POC) Negative Negative    pH (UA POC) 6.5 4.6 - 8.0    Protein (UA POC) Trace Negative    Urobilinogen (UA POC) 0.2 mg/dL 0.2 - 1    Nitrites (UA POC) Negative Negative    Leukocyte esterase (UA POC) Negative Negative       ASSESSMENT and PLAN    ICD-10-CM ICD-9-CM    1. Dizziness G66 321.0 METABOLIC PANEL, COMPREHENSIVE      CBC WITH AUTOMATED DIFF      AMB POC URINALYSIS DIP STICK AUTO W/O MICRO      COLLECTION VENOUS BLOOD,VENIPUNCTURE      REFERRAL TO ENT-OTOLARYNGOLOGY   2. Gastroesophageal reflux disease, esophagitis presence not specified K21.9 530.81 COLLECTION VENOUS BLOOD,VENIPUNCTURE   3. Pneumonia due to infectious organism, unspecified laterality, unspecified part of lung J18.9 136.9 COLLECTION VENOUS BLOOD,VENIPUNCTURE     484.8    4. Sinus congestion R09.81 478.19    5.  Tinnitus of both ears H93.13 388.30 REFERRAL TO ENT-OTOLARYNGOLOGY     current treatment plan is effective, no change in therapy  lab results and schedule of future lab studies reviewed with patient  reviewed diet, exercise and weight control  cardiovascular risk and specific lipid/LDL goals reviewed  reviewed medications and side effects in detail  radiology results and schedule of future radiology studies reviewed with patient    I have discussed the diagnosis with the patient and the intended plan of care as seen in the above orders. The patient has received an after-visit summary and questions were answered concerning future plans. I have discussed medication, side effects, and warnings with the patient in detail. The patient verbalized understanding and is in agreement with the plan of care. The patient will contact the office with any additional concerns.     Isaac Richard MD

## 2018-03-19 NOTE — TELEPHONE ENCOUNTER
Spoke with patient at this time and patient states her dizziness is getting worse. Infomred patient she need to be seen in the ER. Patient declined at this time and states she think its her ear and she want to be referred to the ENT at [facility]. Informed patient I will submit request to provider at this time. Patient verbalized understanding

## 2018-03-19 NOTE — TELEPHONE ENCOUNTER
Patient called stating she had just left the office and she wanted to let Dr. Poppy Momin know that her dizziness is getting worse.

## 2018-03-22 ENCOUNTER — TELEPHONE (OUTPATIENT)
Dept: FAMILY MEDICINE CLINIC | Age: 63
End: 2018-03-22

## 2018-04-16 ENCOUNTER — HOSPITAL ENCOUNTER (OUTPATIENT)
Dept: LAB | Age: 63
Discharge: HOME | End: 2018-04-16
Payer: MEDICARE

## 2018-04-16 ENCOUNTER — OFFICE VISIT (OUTPATIENT)
Dept: FAMILY MEDICINE CLINIC | Age: 63
End: 2018-04-16

## 2018-04-16 VITALS
TEMPERATURE: 96.8 F | BODY MASS INDEX: 31.28 KG/M2 | WEIGHT: 170 LBS | OXYGEN SATURATION: 94 % | HEART RATE: 98 BPM | HEIGHT: 62 IN | SYSTOLIC BLOOD PRESSURE: 128 MMHG | RESPIRATION RATE: 18 BRPM | DIASTOLIC BLOOD PRESSURE: 70 MMHG

## 2018-04-16 DIAGNOSIS — R09.81 SINUS CONGESTION: ICD-10-CM

## 2018-04-16 DIAGNOSIS — R53.81 MALAISE AND FATIGUE: ICD-10-CM

## 2018-04-16 DIAGNOSIS — R53.83 MALAISE AND FATIGUE: ICD-10-CM

## 2018-04-16 DIAGNOSIS — R05.3 CHRONIC COUGH: Primary | ICD-10-CM

## 2018-04-16 DIAGNOSIS — E55.9 VITAMIN D DEFICIENCY: ICD-10-CM

## 2018-04-16 DIAGNOSIS — E53.8 VITAMIN B 12 DEFICIENCY: ICD-10-CM

## 2018-04-16 DIAGNOSIS — Z86.39 H/O THYROID NODULE: ICD-10-CM

## 2018-04-16 DIAGNOSIS — E66.9 OBESITY (BMI 30-39.9): ICD-10-CM

## 2018-04-16 LAB
TSH SERPL DL<=0.05 MIU/L-ACNC: 1.2 UIU/ML (ref 0.36–3.74)
VIT B12 SERPL-MCNC: 812 PG/ML (ref 211–911)

## 2018-04-16 PROCEDURE — 82607 VITAMIN B-12: CPT | Performed by: FAMILY MEDICINE

## 2018-04-16 PROCEDURE — 82306 VITAMIN D 25 HYDROXY: CPT | Performed by: FAMILY MEDICINE

## 2018-04-16 PROCEDURE — 84443 ASSAY THYROID STIM HORMONE: CPT | Performed by: FAMILY MEDICINE

## 2018-04-16 NOTE — MR AVS SNAPSHOT
[facility] [address] [facility] 
[phone] Patient: Raj Crawford MRN: [id number] [facility]:[birth year] Visit Information Date & Time Provider Department Dept. Phone Encounter #  
 4/16/2018  9:30 AM Hesed Juventino Saenz MD [facility] [phone] [id number] Follow-up Instructions Return in about 3 months (around 7/16/2018), or if symptoms worsen or fail to improve, for chronic cough, malaise and fatigue. Upcoming Health Maintenance Date Due Pneumococcal 19-64 Medium Risk (1 of 1 - PPSV23) 1/27/1973 DTaP/Tdap/Td series (1 - Tdap) 1/27/1975 PAP AKA CERVICAL CYTOLOGY 1/27/1975 FOBT Q 1 YEAR AGE 50-75 1/27/2004 ZOSTER VACCINE AGE 60> 11/27/2013 MEDICARE YEARLY EXAM 2/9/2019 BREAST CANCER SCRN MAMMOGRAM 6/27/2019 Allergies as of 4/16/2018  Review Complete On: 4/16/2018 By: Daniel Kohler MD  
  
 Severity Noted Reaction Type Reactions Latex, Natural Rubber  07/16/2013   Side Effect Rash Current Immunizations  Reviewed on 1/15/2014 Name Date Influenza Vaccine 1/8/2014  8:24 AM  
  
 Not reviewed this visit You Were Diagnosed With   
  
 Codes Comments Sinus congestion    -  Primary ICD-10-CM: R09.81 ICD-9-CM: 478.19 Chronic cough     ICD-10-CM: R05 ICD-9-CM: 786.2 Malaise and fatigue     ICD-10-CM: R53.81, R53.83 ICD-9-CM: 780.79 Essential hypertension     ICD-10-CM: I10 
ICD-9-CM: 401.9 Vitamin B 12 deficiency     ICD-10-CM: E53.8 ICD-9-CM: 266.2 Vitamin D deficiency     ICD-10-CM: E55.9 ICD-9-CM: 268.9 Vitals BP Pulse Temp Resp Height(growth percentile) Weight(growth percentile) 128/70 (BP 1 Location: Left arm, BP Patient Position: Sitting) 98 96.8 °F (36 °C) (Oral) 18 5' 2\" (1.575 m) 170 lb (77.1 kg) LMP SpO2 BMI OB Status Smoking Status 05/08/1981 94% 31.09 kg/m2 Hysterectomy Current Every Day Smoker BMI and BSA Data Body Mass Index Body Surface Area 31.09 kg/m 2 1.84 m 2 Preferred Pharmacy Pharmacy Name Phone [address], [address] [facility] Your Updated Medication List  
  
   
This list is accurate as of 4/16/18  9:40 AM.  Always use your most recent med list.  
  
  
  
  
 albuterol 90 mcg/actuation inhaler Commonly known as:  PROVENTIL HFA, VENTOLIN HFA, PROAIR HFA Take  by inhalation. aspirin delayed-release 81 mg tablet 81 mg.  
  
 atorvastatin 20 mg tablet Commonly known as:  LIPITOR Take 1 Tab by mouth daily. montelukast 10 mg tablet Commonly known as:  SINGULAIR Take 1 Tab by mouth daily. multivitamin tablet Commonly known as:  ONE A DAY Take 1 Tab by mouth daily. omeprazole 20 mg capsule Commonly known as:  PRILOSEC Take 20 mg by mouth daily. VITAMIN D3 1,000 unit tablet Generic drug:  cholecalciferol Take  by mouth daily. Follow-up Instructions Return in about 3 months (around 7/16/2018), or if symptoms worsen or fail to improve, for chronic cough, malaise and fatigue. To-Do List   
 04/16/2018 Lab:  TSH 3RD GENERATION   
  
 04/16/2018 Lab:  VITAMIN B12   
  
 04/16/2018 Lab:  VITAMIN D, 25 HYDROXY Patient Instructions Body Mass Index: Care Instructions Your Care Instructions Body mass index (BMI) can help you see if your weight is raising your risk for health problems. It uses a formula to compare how much you weigh with how tall you are. · A BMI lower than 18.5 is considered underweight. · A BMI between 18.5 and 24.9 is considered healthy. · A BMI between 25 and 29.9 is considered overweight. A BMI of 30 or higher is considered obese. If your BMI is in the normal range, it means that you have a lower risk for weight-related health problems.  If your BMI is in the overweight or obese range, you may be at increased risk for weight-related health problems, such as high blood pressure, heart disease, stroke, arthritis or joint pain, and diabetes. If your BMI is in the underweight range, you may be at increased risk for health problems such as fatigue, lower protection (immunity) against illness, muscle loss, bone loss, hair loss, and hormone problems. BMI is just one measure of your risk for weight-related health problems. You may be at higher risk for health problems if you are not active, you eat an unhealthy diet, or you drink too much alcohol or use tobacco products. Follow-up care is a key part of your treatment and safety. Be sure to make and go to all appointments, and call your doctor if you are having problems. It's also a good idea to know your test results and keep a list of the medicines you take. How can you care for yourself at home? · Practice healthy eating habits. This includes eating plenty of fruits, vegetables, whole grains, lean protein, and low-fat dairy. · If your doctor recommends it, get more exercise. Walking is a good choice. Bit by bit, increase the amount you walk every day. Try for at least 30 minutes on most days of the week. · Do not smoke. Smoking can increase your risk for health problems. If you need help quitting, talk to your doctor about stop-smoking programs and medicines. These can increase your chances of quitting for good. · Limit alcohol to 2 drinks a day for men and 1 drink a day for women. Too much alcohol can cause health problems. If you have a BMI higher than 25 · Your doctor may do other tests to check your risk for weight-related health problems. This may include measuring the distance around your waist. A waist measurement of more than 40 inches in men or 35 inches in women can increase the risk of weight-related health problems.  
· Talk with your doctor about steps you can take to stay healthy or improve your health. You may need to make lifestyle changes to lose weight and stay healthy, such as changing your diet and getting regular exercise. If you have a BMI lower than 18.5 · Your doctor may do other tests to check your risk for health problems. · Talk with your doctor about steps you can take to stay healthy or improve your health. You may need to make lifestyle changes to gain or maintain weight and stay healthy, such as getting more healthy foods in your diet and doing exercises to build muscle. Where can you learn more? Go to http://mireya-lisa.info/. Enter S176 in the search box to learn more about \"Body Mass Index: Care Instructions. \" Current as of: October 13, 2016 Content Version: 11.4 © 7748-7121 Healthwise, [employer]. Care instructions adapted under license by [employer] (which disclaims liability or warranty for this information). If you have questions about a medical condition or this instruction, always ask your healthcare professional. [place] [address] any warranty or liability for your use of this information. Introducing [facility]! [employer] introduces [facility] patient portal. Now you can access parts of your medical record, email your doctor's office, and request medication refills online. 1. In your internet browser, go to [email or web address] 2. Click on the First Time User? Click Here link in the Sign In box. You will see the New Member Sign Up page. 3. Enter your [facility] Access Code exactly as it appears below. You will not need to use this code after youve completed the sign-up process. If you do not sign up before the expiration date, you must request a new code. · [facility] Access Code: [email or web address] Expires: 5/9/2018 11:50 AM 
 
4. Enter the last four digits of your Social Security Number (xxxx) and Date of Birth (mm/dd/yyyy) as indicated and click Submit.  You will be taken to the next sign-up page. 5. Create a Mezzobit ID. This will be your Mezzobit login ID and cannot be changed, so think of one that is secure and easy to remember. 6. Create a Mezzobit password. You can change your password at any time. 7. Enter your Password Reset Question and Answer. This can be used at a later time if you forget your password. 8. Enter your e-mail address. You will receive e-mail notification when new information is available in [address]. 9. Click Sign Up. You can now view and download portions of your medical record. 10. Click the Download Summary menu link to download a portable copy of your medical information. If you have questions, please visit the Frequently Asked Questions section of the Mezz[employer] website. Remember, [employer] is NOT to be used for urgent needs. For medical emergencies, dial 911. Now available from your iPhone and Android! Please provide this summary of care documentation to your next provider. Your primary care clinician is listed as Tony Silva. If you have any questions after today's visit, please call [phone].

## 2018-04-16 NOTE — PATIENT INSTRUCTIONS
Body Mass Index: Care Instructions  Your Care Instructions    Body mass index (BMI) can help you see if your weight is raising your risk for health problems. It uses a formula to compare how much you weigh with how tall you are. · A BMI lower than 18.5 is considered underweight. · A BMI between 18.5 and 24.9 is considered healthy. · A BMI between 25 and 29.9 is considered overweight. A BMI of 30 or higher is considered obese. If your BMI is in the normal range, it means that you have a lower risk for weight-related health problems. If your BMI is in the overweight or obese range, you may be at increased risk for weight-related health problems, such as high blood pressure, heart disease, stroke, arthritis or joint pain, and diabetes. If your BMI is in the underweight range, you may be at increased risk for health problems such as fatigue, lower protection (immunity) against illness, muscle loss, bone loss, hair loss, and hormone problems. BMI is just one measure of your risk for weight-related health problems. You may be at higher risk for health problems if you are not active, you eat an unhealthy diet, or you drink too much alcohol or use tobacco products. Follow-up care is a key part of your treatment and safety. Be sure to make and go to all appointments, and call your doctor if you are having problems. It's also a good idea to know your test results and keep a list of the medicines you take. How can you care for yourself at home? · Practice healthy eating habits. This includes eating plenty of fruits, vegetables, whole grains, lean protein, and low-fat dairy. · If your doctor recommends it, get more exercise. Walking is a good choice. Bit by bit, increase the amount you walk every day. Try for at least 30 minutes on most days of the week. · Do not smoke. Smoking can increase your risk for health problems. If you need help quitting, talk to your doctor about stop-smoking programs and medicines. These can increase your chances of quitting for good. · Limit alcohol to 2 drinks a day for men and 1 drink a day for women. Too much alcohol can cause health problems. If you have a BMI higher than 25  · Your doctor may do other tests to check your risk for weight-related health problems. This may include measuring the distance around your waist. A waist measurement of more than 40 inches in men or 35 inches in women can increase the risk of weight-related health problems. · Talk with your doctor about steps you can take to stay healthy or improve your health. You may need to make lifestyle changes to lose weight and stay healthy, such as changing your diet and getting regular exercise. If you have a BMI lower than 18.5  · Your doctor may do other tests to check your risk for health problems. · Talk with your doctor about steps you can take to stay healthy or improve your health. You may need to make lifestyle changes to gain or maintain weight and stay healthy, such as getting more healthy foods in your diet and doing exercises to build muscle. Where can you learn more? Go to http://mireya-lisa.info/. Enter S176 in the search box to learn more about \"Body Mass Index: Care Instructions. \"  Current as of: October 13, 2016  Content Version: 11.4  © 2780-9276 Healthwise, Incorporated. Care instructions adapted under license by [employer] (which disclaims liability or warranty for this information). If you have questions about a medical condition or this instruction, always ask your healthcare professional. [address] any warranty or liability for your use of this information.

## 2018-04-16 NOTE — PROGRESS NOTES
Chief Complaint   Patient presents with    Cough    Dizziness     Patient c/o productive cough for about a month now. She also c/o dizziness and sinus pressure. 1. Have you been to the ER, urgent care clinic since your last visit? Hospitalized since your last visit? No    2. Have you seen or consulted any other health care providers outside of the [address] [place] [address] since your last visit? Include any pap smears or colon screening. No     HPI  Otelia Low comes in for follow-up care. Chronic cough: Patient continues to cough. She has been seen by the pulmonologist.  Was seen 2 weeks ago and was put on inhaler medication. She has been on antibiotics and has just completed a course of the same. She denies fever or chills. She has cough with wheezing. Cough is productive of mucopurulent phlegm. She has generalized fatigue and malaise. No hemoptysis. Given she has just completed a course of antibiotics and has no fever or chills we will have her continue her inhaler medication and the follow-up with the pulmonologist.  I will request the pulmonologist records. Will hold off on a chest x-ray for now. She was treated for pneumonia last month. Sinus congestion: Patient has congestion in the nasal areas and frontal sinuses. She is using Flonase and also is on Singulair. We will continue with these medications. She was referred to see the ENT doctor. She is yet to see them but will call to reschedule her visit. Hernias: Patient had hernia repair as. This was done by her general surgeon. She has a follow-up appointment tomorrow. Vitamin D deficiency: We will check her vitamin D levels today. If low we will send in medication for the same. [id number]: Patient has vitamin B12 deficiency history. I will recheck labs. She wondered about vitamin B12 injection. Malaise and fatigue: This is likely multifactorial given patient is convalescing from pneumonia.   I will check a thyroid level.  Health maintenance: Patient has had a Pap smear and we will request the records. She states that that she is due to get a colonoscopy done soon. We will have the records sent to us. Obesity: Patient is a BMI of 31.09. We discussed normal BMI. She will do lifestyle and dietary modification in an effort to lose weight. Past Medical History  Past Medical History:   Diagnosis Date    Acid reflux     Anemia     COPD (chronic obstructive pulmonary disease) (HCC)     Cystic disease of kidney     GERD (gastroesophageal reflux disease)     Goiter     Hemorrhoid     History of blood transfusion     HTN (hypertension)     Hyperlipidemia     Hypertension     Renal cyst        Surgical History  Past Surgical History:   Procedure Laterality Date    HX COLONOSCOPY      HX ENDOSCOPY      HX HEENT  2011    Partial thyroid    HX HYSTERECTOMY      HX ORTHOPAEDIC  2005    left shoulder sugery    HX PARTIAL HYSTERECTOMY  1990    HX THYROIDECTOMY          Medications  Current Outpatient Prescriptions   Medication Sig Dispense Refill    montelukast (SINGULAIR) 10 mg tablet Take 1 Tab by mouth daily. 30 Tab 0    albuterol (PROVENTIL HFA, VENTOLIN HFA, PROAIR HFA) 90 mcg/actuation inhaler Take  by inhalation.  aspirin delayed-release 81 mg tablet 81 mg.      atorvastatin (LIPITOR) 20 mg tablet Take 1 Tab by mouth daily. 30 Tab 2    multivitamin (ONE A DAY) tablet Take 1 Tab by mouth daily.  omeprazole (PRILOSEC) 20 mg capsule Take 20 mg by mouth daily.  cholecalciferol (VITAMIN D3) 1,000 unit tablet Take  by mouth daily.          Allergies  Allergies   Allergen Reactions    Latex, Natural Rubber Rash       Family History  Family History   Problem Relation Age of Onset    Diabetes Mother     Hypertension Mother    [facility] Father     Hypertension Father        Social History  Social History     Social History    Marital status: SINGLE     Spouse name: N/A    Number of children: N/A  Years of education: N/A     Occupational History    Not on file.      Social History Main Topics    Smoking status: Current Every Day Smoker     Packs/day: 0.30     Years: 27.00     Types: Cigarettes    Smokeless tobacco: Never Used    Alcohol use No    Drug use: No    Sexual activity: No     Other Topics Concern    Not on file     Social History Narrative    ** Merged History Encounter **            Review of Systems  Review of Systems - History obtained from chart review and the patient  General ROS: positive for  - fatigue and malaise  negative for - chills or fever  Psychological ROS: positive for - anxiety  Ophthalmic ROS: negative  ENT ROS: positive for - nasal congestion, nasal discharge, sinus pain and sneezing  Allergy and Immunology ROS: positive for - nasal congestion and seasonal allergies  Hematological and Lymphatic ROS: negative  Endocrine ROS: negative  Respiratory ROS: positive for - cough, sputum changes and wheezing  negative for - hemoptysis or pleuritic pain  Cardiovascular ROS: negative  Gastrointestinal ROS: no abdominal pain, change in bowel habits, or black or bloody stools  Genito-Urinary ROS: negative  Musculoskeletal ROS: positive for - joint pain and muscular weakness  Neurological ROS: no TIA or stroke symptoms    Vital Signs  Visit Vitals    /70 (BP 1 Location: Left arm, BP Patient Position: Sitting)    Pulse 98    Temp 96.8 °F (36 °C) (Oral)    Resp 18    Ht 5' 2\" (1.575 m)    Wt 170 lb (77.1 kg)    LMP 05/08/1981    SpO2 94%    BMI 31.09 kg/m2         Physical Exam  Physical Examination: General appearance - oriented to person, place, and time, acyanotic, in no respiratory distress and well hydrated  Mental status - alert, oriented to person, place, and time, affect appropriate to mood  Nose - mucosal congestion, mucosal erythema and clear rhinorrhea  Mouth - mucous membranes moist, pharynx normal without lesions  Neck - supple, no significant adenopathy  Lymphatics - no palpable lymphadenopathy  Chest - no tachypnea, retractions or cyanosis, decreased air entry noted bilateral bases  Heart - normal rate and regular rhythm, S1 and S2 normal  Back exam - limited range of motion  Neurological - motor and sensory grossly normal bilaterally  Musculoskeletal - no muscular tenderness noted  Extremities - intact peripheral pulses    Results  Results for orders placed or performed during the hospital encounter of 79/02/84   METABOLIC PANEL, COMPREHENSIVE   Result Value Ref Range    Sodium 140 136 - 145 mmol/L    Potassium 4.4 3.5 - 5.5 mmol/L    Chloride 101 100 - 108 mmol/L    CO2 29 21 - 32 mmol/L    Anion gap 10 3.0 - 18 mmol/L    Glucose 90 74 - 99 mg/dL    BUN 15 7.0 - 18 MG/DL    Creatinine 0.92 0.6 - 1.3 MG/DL    BUN/Creatinine ratio 16 12 - 20      GFR est AA >60 >60 ml/min/1.73m2    GFR est non-AA >60 >60 ml/min/1.73m2    Calcium 9.7 8.5 - 10.1 MG/DL    Bilirubin, total 0.5 0.2 - 1.0 MG/DL    ALT (SGPT) 40 13 - 56 U/L    AST (SGOT) 18 15 - 37 U/L    Alk. phosphatase 90 45 - 117 U/L    Protein, total 7.7 6.4 - 8.2 g/dL    Albumin 4.3 3.4 - 5.0 g/dL    Globulin 3.4 2.0 - 4.0 g/dL    A-G Ratio 1.3 0.8 - 1.7     CBC WITH AUTOMATED DIFF   Result Value Ref Range    WBC 7.2 4.6 - 13.2 K/uL    RBC 4.32 4.20 - 5.30 M/uL    HGB 12.4 12.0 - 16.0 g/dL    HCT 40.2 35.0 - 45.0 %    MCV 93.1 74.0 - 97.0 FL    MCH 28.7 24.0 - 34.0 PG    MCHC 30.8 (L) 31.0 - 37.0 g/dL    RDW 13.7 11.6 - 14.5 %    PLATELET 834 (H) 727 - 420 K/uL    MPV 10.7 9.2 - 11.8 FL    NEUTROPHILS 59 40 - 73 %    LYMPHOCYTES 29 21 - 52 %    MONOCYTES 7 3 - 10 %    EOSINOPHILS 4 0 - 5 %    BASOPHILS 1 0 - 2 %    ABS. NEUTROPHILS 4.3 1.8 - 8.0 K/UL    ABS. LYMPHOCYTES 2.1 0.9 - 3.6 K/UL    ABS. MONOCYTES 0.5 0.05 - 1.2 K/UL    ABS. EOSINOPHILS 0.3 0.0 - 0.4 K/UL    ABS. BASOPHILS 0.1 (H) 0.0 - 0.06 K/UL    DF AUTOMATED         ASSESSMENT and PLAN      ICD-10-CM ICD-9-CM    1.  Chronic cough R05 786.2 2. Sinus congestion R09.81 478.19    3. Malaise and fatigue R53.81 780.79 TSH 3RD GENERATION    R53.83  COLLECTION VENOUS BLOOD,VENIPUNCTURE   4. Vitamin B 12 deficiency E53.8 266.2 VITAMIN B12      COLLECTION VENOUS BLOOD,VENIPUNCTURE   5. Vitamin D deficiency E55.9 268.9 VITAMIN D, 25 HYDROXY      COLLECTION VENOUS BLOOD,VENIPUNCTURE   6. Obesity (BMI 30-39. 9) E66.9 278.00    7. H/O thyroid nodule Z86.39 V12.29    Discussed the patient's BMI with her. The BMI follow up plan is as follows:     dietary management education, guidance, and counseling  encourage exercise  monitor weight  lab results and schedule of future lab studies reviewed with patient  reviewed diet, exercise and weight control  cardiovascular risk and specific lipid/LDL goals reviewed  reviewed medications and side effects in detail    I have discussed the diagnosis with the patient and the intended plan of care as seen in the above orders. The patient has received an after-visit summary and questions were answered concerning future plans. I have discussed medication, side effects, and warnings with the patient in detail. The patient verbalized understanding and is in agreement with the plan of care. The patient will contact the office with any additional concerns.     Chantelle Ron MD

## 2018-04-17 LAB — 25(OH)D3 SERPL-MCNC: 21.9 NG/ML (ref 30–100)

## 2018-04-19 DIAGNOSIS — E55.9 HYPOVITAMINOSIS D: Primary | ICD-10-CM

## 2018-04-19 RX ORDER — ERGOCALCIFEROL 1.25 MG/1
50000 CAPSULE ORAL
Qty: 12 CAP | Refills: 1 | Status: SHIPPED | OUTPATIENT
Start: 2018-04-19 | End: 2018-07-05 | Stop reason: SDUPTHER

## 2018-04-19 NOTE — PROGRESS NOTES
Please let patient know her vitamin D level is low. I will send in vitamin D replacement to take once a week. Recheck in 12 weeks. Rest of her labs are stable.   River Rodríguez MD

## 2018-04-19 NOTE — PROGRESS NOTES
She should hold off on the daily vitamin D for now and to take the weekly 50,000 units.   Arnav Sosa MD

## 2018-04-23 NOTE — PROGRESS NOTES
Spoke with patient at this time.  Informed patient of results patient verbalized understanding at this time

## 2018-06-21 ENCOUNTER — OFFICE VISIT (OUTPATIENT)
Dept: FAMILY MEDICINE CLINIC | Age: 63
End: 2018-06-21

## 2018-06-21 VITALS
HEART RATE: 86 BPM | DIASTOLIC BLOOD PRESSURE: 68 MMHG | RESPIRATION RATE: 18 BRPM | OXYGEN SATURATION: 96 % | SYSTOLIC BLOOD PRESSURE: 130 MMHG | WEIGHT: 166 LBS | BODY MASS INDEX: 30.55 KG/M2 | TEMPERATURE: 97 F | HEIGHT: 62 IN

## 2018-06-21 DIAGNOSIS — I10 ESSENTIAL HYPERTENSION: ICD-10-CM

## 2018-06-21 DIAGNOSIS — E55.9 HYPOVITAMINOSIS D: Primary | ICD-10-CM

## 2018-06-21 DIAGNOSIS — K21.9 GASTROESOPHAGEAL REFLUX DISEASE, ESOPHAGITIS PRESENCE NOT SPECIFIED: ICD-10-CM

## 2018-06-21 DIAGNOSIS — J44.9 CHRONIC OBSTRUCTIVE PULMONARY DISEASE, UNSPECIFIED COPD TYPE (HCC): ICD-10-CM

## 2018-06-21 NOTE — MR AVS SNAPSHOT
[facility] [address] [facility] 
[phone] Patient: Madeline Crespo MRN: [id number] BKX:[birth year] Visit Information Date & Time Provider Department Dept. Phone Encounter #  
 6/21/2018 10:00 AM Tin Whalen [phone] [id number] Follow-up Instructions Return in about 6 months (around 12/21/2018), or if symptoms worsen or fail to improve, for hypovitaminosis D, COPD. Upcoming Health Maintenance Date Due Pneumococcal 19-64 Medium Risk (1 of 1 - PPSV23) 1/27/1973 DTaP/Tdap/Td series (1 - Tdap) 1/27/1975 FOBT Q 1 YEAR AGE 50-75 1/27/2004 ZOSTER VACCINE AGE 60> 11/27/2013 Influenza Age 5 to Adult 8/1/2018 MEDICARE YEARLY EXAM 2/9/2019 BREAST CANCER SCRN MAMMOGRAM 6/27/2019 Allergies as of 6/21/2018  Review Complete On: 6/21/2018 By: Mu Fuller MD  
  
 Severity Noted Reaction Type Reactions Latex, Natural Rubber  07/16/2013   Side Effect Rash Current Immunizations  Reviewed on 1/15/2014 Name Date Influenza Vaccine 1/8/2014  8:24 AM  
  
 Not reviewed this visit You Were Diagnosed With   
  
 Codes Comments Hypovitaminosis D    -  Primary ICD-10-CM: E55.9 ICD-9-CM: 268.9 Essential hypertension     ICD-10-CM: I10 
ICD-9-CM: 401.9 Chronic obstructive pulmonary disease, unspecified COPD type ([facility] 75.)     ICD-10-CM: J44.9 ICD-9-CM: 371 Vitals BP Pulse Temp Resp Height(growth percentile) Weight(growth percentile) 130/68 (BP 1 Location: Left arm, BP Patient Position: Sitting) (!) 109 97 °F (36.1 °C) (Oral) 18 5' 2\" (1.575 m) 166 lb (75.3 kg) LMP SpO2 BMI OB Status Smoking Status 05/08/1981 96% 30.36 kg/m2 Hysterectomy Current Every Day Smoker BMI and BSA Data Body Mass Index Body Surface Area  
 30.36 kg/m 2 1.81 m 2 Preferred Pharmacy Pharmacy Name Phone [address], [address] [facility] Your Updated Medication List  
  
   
This list is accurate as of 6/21/18 10:09 AM.  Always use your most recent med list.  
  
  
  
  
 albuterol 90 mcg/actuation inhaler Commonly known as:  PROVENTIL HFA, VENTOLIN HFA, PROAIR HFA Take  by inhalation. aspirin delayed-release 81 mg tablet 81 mg.  
  
 atorvastatin 20 mg tablet Commonly known as:  LIPITOR Take 1 Tab by mouth daily. ergocalciferol 50,000 unit capsule Commonly known as:  ERGOCALCIFEROL Take 1 Cap by mouth every seven (7) days. montelukast 10 mg tablet Commonly known as:  SINGULAIR Take 1 Tab by mouth daily. multivitamin tablet Commonly known as:  ONE A DAY Take 1 Tab by mouth daily. omeprazole 20 mg capsule Commonly known as:  PRILOSEC Take 20 mg by mouth daily. Follow-up Instructions Return in about 6 months (around 12/21/2018), or if symptoms worsen or fail to improve, for hypovitaminosis D, COPD. Introducing [facility] & HEALTH SERVICES! Rob Azevedo introduces [employer] patient portal. Now you can access parts of your medical record, email your doctor's office, and request medication refills online. 1. In your internet browser, go to [email or web address] 2. Click on the First Time User? Click Here link in the Sign In box. You will see the New Member Sign Up page. 3. Enter your [employer] Access Code exactly as it appears below. You will not need to use this code after youve completed the sign-up process. If you do not sign up before the expiration date, you must request a new code. · [employer] Access Code: [email or web address] Expires: 9/19/2018 10:09 AM 
 
4. Enter the last four digits of your Social Security Number (xxxx) and Date of Birth (mm/dd/yyyy) as indicated and click Submit. You will be taken to the next sign-up page. 5. Create a Zentact ID. This will be your Zentact login ID and cannot be changed, so think of one that is secure and easy to remember. 6. Create a Zentact password. You can change your password at any time. 7. Enter your Password Reset Question and Answer. This can be used at a later time if you forget your password. 8. Enter your e-mail address. You will receive e-mail notification when new information is available in [address] 9. Click Sign Up. You can now view and download portions of your medical record. 10. Click the Download Summary menu link to download a portable copy of your medical information. If you have questions, please visit the Frequently Asked Questions section of the [employer]ent[employer] website. Remember, [employer] is NOT to be used for urgent needs. For medical emergencies, dial 911. Now available from your iPhone and Android! Please provide this summary of care documentation to your next provider. Your primary care clinician is listed as Robbie Echavarria. If you have any questions after today's visit, please call [phone].

## 2018-06-21 NOTE — PROGRESS NOTES
Chief Complaint   Patient presents with    Blood Pressure Check     1. Have you been to the ER, urgent care clinic since your last visit? Hospitalized since your last visit? No    2. Have you seen or consulted any other health care providers outside of the [address] [place] [address] since your last visit? Include any pap smears or colon screening. No     HPI  Evaline Ralphs comes in for follow-up care. Blood pressure check: Patient comes in for checking of her blood pressure. She wondered whether this was elevated. Blood pressure is normal.  She is not on any medication for blood pressure control. Previously was on antihypertensives. She is doing lifestyle and dietary modification. We will continue with this. Encouraged to take a low-salt diet. Hypovitaminosis D: Patient is taking vitamin D replacement therapy. Continue with the current treatment plan. COPD: Patient is using nebulizer with albuterol as needed. She does have occasional chest tightness especially when she is in an environment that is humid or has smoke. Denies chest pain, cough or shortness of breath. Will continue with the current treatment plan. GERD: Patient is on omeprazole. She does have reflux symptoms especially at night. We discussed. Supportive care measures. is symptoms persist or worsen we will refer her  to the gastroenterologist for possible EGD. Health maintenance: Patient states that she has had hysterectomy due to fibroids. This was done about 20 years ago. She is due to have a colonoscopy done later this month and she will have the report sent to us. We discussed getting the pneumonia vaccine. States that she has gotten a pneumonia vaccine in the past.  We will try and get that record. She will need to get vaccinated at 71 years.     Past Medical History  Past Medical History:   Diagnosis Date    Acid reflux     Anemia     COPD (chronic obstructive pulmonary disease) (HCC)     Cystic disease of kidney     GERD (gastroesophageal reflux disease)     Goiter     Hemorrhoid     History of blood transfusion     HTN (hypertension)     Hyperlipidemia     Hypertension     Renal cyst        Surgical History  Past Surgical History:   Procedure Laterality Date    HX COLONOSCOPY      HX ENDOSCOPY      HX HEENT  2011    Partial thyroid    HX HYSTERECTOMY      HX ORTHOPAEDIC  2005    left shoulder sugery    HX PARTIAL HYSTERECTOMY  1990    HX THYROIDECTOMY          Medications  Current Outpatient Prescriptions   Medication Sig Dispense Refill    ergocalciferol (ERGOCALCIFEROL) 50,000 unit capsule Take 1 Cap by mouth every seven (7) days. 12 Cap 1    aspirin delayed-release 81 mg tablet 81 mg.      multivitamin (ONE A DAY) tablet Take 1 Tab by mouth daily.  albuterol (PROVENTIL HFA, VENTOLIN HFA, PROAIR HFA) 90 mcg/actuation inhaler Take  by inhalation.  omeprazole (PRILOSEC) 20 mg capsule Take 20 mg by mouth daily.  atorvastatin (LIPITOR) 20 mg tablet Take 1 Tab by mouth daily. 30 Tab 2    montelukast (SINGULAIR) 10 mg tablet Take 1 Tab by mouth daily. 30 Tab 0       Allergies  Allergies   Allergen Reactions    Latex, Natural Rubber Rash       Family History  Family History   Problem Relation Age of Onset    Diabetes Mother     Hypertension Mother     Cancer Father     Hypertension Father        Social History  Social History     Social History    Marital status: SINGLE     Spouse name: N/A    Number of children: N/A    Years of education: N/A     Occupational History    Not on file.      Social History Main Topics    Smoking status: Current Every Day Smoker     Packs/day: 0.30     Years: 27.00     Types: Cigarettes    Smokeless tobacco: Never Used    Alcohol use No    Drug use: No    Sexual activity: No     Other Topics Concern    Not on file     Social History Narrative    ** Merged History Encounter **            Review of Systems  Review of Systems - Negative except as noted above in the HPI    Vital Signs  Visit Vitals    /68 (BP 1 Location: Left arm, BP Patient Position: Sitting)    Pulse 86    Temp 97 °F (36.1 °C) (Oral)    Resp 18    Ht 5' 2\" (1.575 m)    Wt 166 lb (75.3 kg)    LMP 05/08/1981    SpO2 96%    BMI 30.36 kg/m2         Physical Exam  Physical Examination: General appearance - alert, well appearing, and in no distress, oriented to person, place, and time and acyanotic, in no respiratory distress  Mental status - alert, oriented to person, place, and time, affect appropriate to mood  Eyes - sclera anicteric  Mouth - mucous membranes moist, pharynx normal without lesions  Neck - supple, no significant adenopathy  Lymphatics - no palpable lymphadenopathy  Chest - clear to auscultation, no wheezes, rales or rhonchi, symmetric air entry  Heart - normal rate and regular rhythm, S1 and S2 normal  Neurological - alert, oriented, normal speech, no focal findings or movement disorder noted  Musculoskeletal - full range of motion without pain  Extremities - intact peripheral pulses    Results  Results for orders placed or performed during the hospital encounter of 04/16/18   TSH 3RD GENERATION   Result Value Ref Range    TSH 1.20 0.36 - 3.74 uIU/mL   VITAMIN D, 25 HYDROXY   Result Value Ref Range    Vitamin D 25-Hydroxy 21.9 (L) 30 - 100 ng/mL   VITAMIN B12   Result Value Ref Range    Vitamin B12 812 211 - 911 pg/mL       ASSESSMENT and PLAN    ICD-10-CM ICD-9-CM    1. Hypovitaminosis D E55.9 268.9    2. Essential hypertension I10 401.9    3. Chronic obstructive pulmonary disease, unspecified COPD type ([facility] J44.9 496    4.  Gastroesophageal reflux disease, esophagitis presence not specified K21.9 530.81      lab results and schedule of future lab studies reviewed with patient  reviewed diet, exercise and weight control  reviewed medications and side effects in detail    I have discussed the diagnosis with the patient and the intended plan of care as seen in the above orders. The patient has received an after-visit summary and questions were answered concerning future plans. I have discussed medication, side effects, and warnings with the patient in detail. The patient verbalized understanding and is in agreement with the plan of care. The patient will contact the office with any additional concerns.     Malu Davis MD

## 2018-07-05 ENCOUNTER — HOSPITAL ENCOUNTER (OUTPATIENT)
Dept: LAB | Age: 63
Discharge: HOME | End: 2018-07-05
Payer: MEDICARE

## 2018-07-05 ENCOUNTER — OFFICE VISIT (OUTPATIENT)
Dept: FAMILY MEDICINE CLINIC | Age: 63
End: 2018-07-05

## 2018-07-05 VITALS
SYSTOLIC BLOOD PRESSURE: 127 MMHG | TEMPERATURE: 97 F | DIASTOLIC BLOOD PRESSURE: 77 MMHG | WEIGHT: 167 LBS | OXYGEN SATURATION: 96 % | HEIGHT: 62 IN | RESPIRATION RATE: 18 BRPM | HEART RATE: 94 BPM | BODY MASS INDEX: 30.73 KG/M2

## 2018-07-05 DIAGNOSIS — R53.83 MALAISE AND FATIGUE: ICD-10-CM

## 2018-07-05 DIAGNOSIS — E78.5 DYSLIPIDEMIA: ICD-10-CM

## 2018-07-05 DIAGNOSIS — R05.3 CHRONIC COUGH: Primary | ICD-10-CM

## 2018-07-05 DIAGNOSIS — R53.81 MALAISE AND FATIGUE: ICD-10-CM

## 2018-07-05 DIAGNOSIS — N95.1 MENOPAUSAL AND FEMALE CLIMACTERIC STATES: ICD-10-CM

## 2018-07-05 DIAGNOSIS — R05.3 CHRONIC COUGH: ICD-10-CM

## 2018-07-05 DIAGNOSIS — E55.9 HYPOVITAMINOSIS D: ICD-10-CM

## 2018-07-05 DIAGNOSIS — E66.9 OBESITY (BMI 30-39.9): ICD-10-CM

## 2018-07-05 DIAGNOSIS — J44.9 CHRONIC OBSTRUCTIVE PULMONARY DISEASE, UNSPECIFIED COPD TYPE (HCC): ICD-10-CM

## 2018-07-05 DIAGNOSIS — K21.9 GASTROESOPHAGEAL REFLUX DISEASE, ESOPHAGITIS PRESENCE NOT SPECIFIED: ICD-10-CM

## 2018-07-05 LAB
ALBUMIN SERPL-MCNC: 4.2 G/DL (ref 3.4–5)
ALBUMIN/GLOB SERPL: 1.2 {RATIO} (ref 0.8–1.7)
ALP SERPL-CCNC: 83 U/L (ref 45–117)
ALT SERPL-CCNC: 63 U/L (ref 13–56)
ANION GAP SERPL CALC-SCNC: 9 MMOL/L (ref 3–18)
AST SERPL-CCNC: 31 U/L (ref 15–37)
BASOPHILS # BLD: 0.1 K/UL (ref 0–0.06)
BASOPHILS NFR BLD: 1 % (ref 0–2)
BILIRUB SERPL-MCNC: 0.3 MG/DL (ref 0.2–1)
BUN SERPL-MCNC: 12 MG/DL (ref 7–18)
BUN/CREAT SERPL: 16 (ref 12–20)
CALCIUM SERPL-MCNC: 9.3 MG/DL (ref 8.5–10.1)
CHLORIDE SERPL-SCNC: 105 MMOL/L (ref 100–108)
CHOLEST SERPL-MCNC: 174 MG/DL
CO2 SERPL-SCNC: 26 MMOL/L (ref 21–32)
CREAT SERPL-MCNC: 0.73 MG/DL (ref 0.6–1.3)
DIFFERENTIAL METHOD BLD: ABNORMAL
EOSINOPHIL # BLD: 0.3 K/UL (ref 0–0.4)
EOSINOPHIL NFR BLD: 3 % (ref 0–5)
ERYTHROCYTE [DISTWIDTH] IN BLOOD BY AUTOMATED COUNT: 14.2 % (ref 11.6–14.5)
GLOBULIN SER CALC-MCNC: 3.6 G/DL (ref 2–4)
GLUCOSE SERPL-MCNC: 85 MG/DL (ref 74–99)
HCT VFR BLD AUTO: 39.7 % (ref 35–45)
HDLC SERPL-MCNC: 39 MG/DL (ref 40–60)
HDLC SERPL: 4.5 {RATIO} (ref 0–5)
HGB BLD-MCNC: 12.5 G/DL (ref 12–16)
LDLC SERPL CALC-MCNC: 108.8 MG/DL (ref 0–100)
LIPID PROFILE,FLP: ABNORMAL
LYMPHOCYTES # BLD: 2.4 K/UL (ref 0.9–3.6)
LYMPHOCYTES NFR BLD: 28 % (ref 21–52)
MCH RBC QN AUTO: 28.2 PG (ref 24–34)
MCHC RBC AUTO-ENTMCNC: 31.5 G/DL (ref 31–37)
MCV RBC AUTO: 89.6 FL (ref 74–97)
MONOCYTES # BLD: 0.5 K/UL (ref 0.05–1.2)
MONOCYTES NFR BLD: 6 % (ref 3–10)
NEUTS SEG # BLD: 5.5 K/UL (ref 1.8–8)
NEUTS SEG NFR BLD: 62 % (ref 40–73)
PLATELET # BLD AUTO: 408 K/UL (ref 135–420)
PMV BLD AUTO: 10.8 FL (ref 9.2–11.8)
POTASSIUM SERPL-SCNC: 4.2 MMOL/L (ref 3.5–5.5)
PROT SERPL-MCNC: 7.8 G/DL (ref 6.4–8.2)
RBC # BLD AUTO: 4.43 M/UL (ref 4.2–5.3)
SODIUM SERPL-SCNC: 140 MMOL/L (ref 136–145)
TRIGL SERPL-MCNC: 131 MG/DL (ref ?–150)
VLDLC SERPL CALC-MCNC: 26.2 MG/DL
WBC # BLD AUTO: 8.7 K/UL (ref 4.6–13.2)

## 2018-07-05 PROCEDURE — 82306 VITAMIN D 25 HYDROXY: CPT | Performed by: FAMILY MEDICINE

## 2018-07-05 PROCEDURE — 85025 COMPLETE CBC W/AUTO DIFF WBC: CPT | Performed by: FAMILY MEDICINE

## 2018-07-05 PROCEDURE — 80053 COMPREHEN METABOLIC PANEL: CPT | Performed by: FAMILY MEDICINE

## 2018-07-05 PROCEDURE — 80061 LIPID PANEL: CPT | Performed by: FAMILY MEDICINE

## 2018-07-05 RX ORDER — AMOXICILLIN AND CLAVULANATE POTASSIUM 875; 125 MG/1; MG/1
TABLET, FILM COATED ORAL
COMMUNITY
Start: 2018-06-22 | End: 2018-08-13

## 2018-07-05 RX ORDER — MOXIFLOXACIN 5 MG/ML
SOLUTION/ DROPS OPHTHALMIC
COMMUNITY
Start: 2018-06-21 | End: 2018-10-02

## 2018-07-05 RX ORDER — ERGOCALCIFEROL 1.25 MG/1
50000 CAPSULE ORAL
Qty: 12 CAP | Refills: 1 | Status: SHIPPED | OUTPATIENT
Start: 2018-07-05 | End: 2019-04-24 | Stop reason: SDUPTHER

## 2018-07-05 RX ORDER — DUREZOL 0.5 MG/ML
EMULSION OPHTHALMIC
COMMUNITY
Start: 2018-06-19 | End: 2018-10-02

## 2018-07-05 RX ORDER — KETOROLAC TROMETHAMINE 5 MG/ML
SOLUTION OPHTHALMIC
COMMUNITY
Start: 2018-06-19 | End: 2018-10-02

## 2018-07-05 RX ORDER — PAROXETINE 10 MG/1
10 TABLET, FILM COATED ORAL DAILY
Qty: 30 TAB | Refills: 1 | Status: SHIPPED | OUTPATIENT
Start: 2018-07-05 | End: 2018-08-13 | Stop reason: SDUPTHER

## 2018-07-05 RX ORDER — PREDNISONE 10 MG/1
TABLET ORAL
COMMUNITY
Start: 2018-06-22 | End: 2018-08-13

## 2018-07-05 NOTE — MR AVS SNAPSHOT
[facility] [address] [facility] 
[phone] Patient: Kandi Russellsin MRN: [id number] OYP:[birth year] Visit Information Date & Time Provider Department Dept. Phone Encounter #  
 7/5/2018 11:00 AM Madelyn Olivo [address] [facility] [place] [phone] [id number] Follow-up Instructions Return in about 1 month (around 8/5/2018), or if symptoms worsen or fail to improve, for malaise and fatigue, menopause, bloating, cough. Your Appointments 12/26/2018 10:00 AM  
ROUTINE CARE with MD Elida Olivo. [address] [facility] ([address]) Appt Note: for hypovitaminosis D, COPD  
 [address] [facility] 49  
  
   
 [facility] [address] [facility] Upcoming Health Maintenance Date Due Pneumococcal 19-64 Medium Risk (1 of 1 - PPSV23) 1/27/1973 DTaP/Tdap/Td series (1 - Tdap) 1/27/1975 FOBT Q 1 YEAR AGE 50-75 1/27/2004 ZOSTER VACCINE AGE 60> 11/27/2013 Influenza Age 5 to Adult 8/1/2018 MEDICARE YEARLY EXAM 2/9/2019 BREAST CANCER SCRN MAMMOGRAM 6/27/2019 Allergies as of 7/5/2018  Review Complete On: 7/5/2018 By: Debi Cornell MD  
  
 Severity Noted Reaction Type Reactions Latex, Natural Rubber  07/16/2013   Side Effect Rash Current Immunizations  Reviewed on 1/15/2014 Name Date Influenza Vaccine 1/8/2014  8:24 AM  
  
 Not reviewed this visit You Were Diagnosed With   
  
 Codes Comments Chronic cough    -  Primary ICD-10-CM: R98 ICD-9-CM: 786.2 Gastroesophageal reflux disease, esophagitis presence not specified     ICD-10-CM: K21.9 ICD-9-CM: 530.81 Chronic obstructive pulmonary disease, unspecified COPD type ([facility]     ICD-10-CM: J44.9 ICD-9-CM: 422 Menopausal and female climacteric states     ICD-10-CM: N95.1 ICD-9-CM: 627.2 Obesity (BMI 30-39. 9)     ICD-10-CM: E66.9 ICD-9-CM: 278.00 Hypovitaminosis D     ICD-10-CM: E55.9 ICD-9-CM: 268.9 Dyslipidemia     ICD-10-CM: E78.5 ICD-9-CM: 272.4 Malaise and fatigue     ICD-10-CM: R53.81, R53.83 ICD-9-CM: 780.79 Vitals BP Pulse Temp Resp Height(growth percentile) Weight(growth percentile) 127/77 (BP 1 Location: Left arm, BP Patient Position: Sitting) 94 97 °F (36.1 °C) (Oral) 18 5' 2\" (1.575 m) 167 lb (75.8 kg) LMP SpO2 BMI OB Status Smoking Status 05/08/1981 96% 30.54 kg/m2 Hysterectomy Current Every Day Smoker BMI and BSA Data Body Mass Index Body Surface Area 30.54 kg/m 2 1.82 m 2 Preferred Pharmacy Pharmacy Name Phone [address] [facility] [address] [facility], [address] [facility] Your Updated Medication List  
  
   
This list is accurate as of 7/5/18 11:32 AM.  Always use your most recent med list.  
  
  
  
  
 albuterol 90 mcg/actuation inhaler Commonly known as:  PROVENTIL HFA, VENTOLIN HFA, PROAIR HFA Take  by inhalation. amoxicillin-clavulanate 875-125 mg per tablet Commonly known as:  AUGMENTIN  
  
 aspirin delayed-release 81 mg tablet 81 mg.  
  
 atorvastatin 20 mg tablet Commonly known as:  LIPITOR Take 1 Tab by mouth daily. DUREZOL 0.05 % ophthalmic emulsion Generic drug:  Difluprednate  
  
 ergocalciferol 50,000 unit capsule Commonly known as:  ERGOCALCIFEROL Take 1 Cap by mouth every seven (7) days. ketorolac 0.5 % ophthalmic solution Commonly known as:  ACULAR  
  
 montelukast 10 mg tablet Commonly known as:  SINGULAIR Take 1 Tab by mouth daily. moxifloxacin 0.5 % ophthalmic solution Commonly known as:  VIGAMOX  
  
 multivitamin tablet Commonly known as:  ONE A DAY Take 1 Tab by mouth daily. omeprazole 20 mg capsule Commonly known as:  PRILOSEC Take 20 mg by mouth daily. PARoxetine 10 mg tablet Commonly known as:  PAXIL Take 1 Tab by mouth daily. Indications: VASOMOTOR SYMPTOMS ASSOCIATED WITH MENOPAUSE  
  
 predniSONE 10 mg tablet Commonly known as:  Sharon Gelarabella Prescriptions Sent to Pharmacy Refills PARoxetine (PAXIL) 10 mg tablet 1 Sig: Take 1 Tab by mouth daily. Indications: VASOMOTOR SYMPTOMS ASSOCIATED WITH MENOPAUSE Class: Normal  
 Pharmacy: [facility] [address] [facility]ER[facility] [address] Ph #: [phone] Route: Oral  
  
We Performed the Following REFERRAL TO DIETITIAN [MME03 Custom] Follow-up Instructions Return in about 1 month (around 8/5/2018), or if symptoms worsen or fail to improve, for malaise and fatigue, menopause, bloating, cough. To-Do List   
 07/05/2018 Lab:  CBC WITH AUTOMATED DIFF   
  
 07/05/2018 Lab:  LIPID PANEL   
  
 07/05/2018 Lab:  METABOLIC PANEL, COMPREHENSIVE   
  
 07/05/2018 Lab:  VITAMIN D, 25 HYDROXY Referral Information Referral ID Referred By Referred To  
  
 [id number] Yamileth SUMMERS Not Available Visits Status Start Date End Date 1 New Request 7/5/18 7/5/19 If your referral has a status of pending review or denied, additional information will be sent to support the outcome of this decision. Introducing [facility] & HEALTH SERVICES! [employer] introduces myeasydocs patient portal. Now you can access parts of your medical record, email your doctor's office, and request medication refills online. 1. In your internet browser, go to [email or web address] 2. Click on the First Time User? Click Here link in the Sign In box. You will see the New Member Sign Up page. 3. Enter your myeasydocs Access Code exactly as it appears below. You will not need to use this code after youve completed the sign-up process. If you do not sign up before the expiration date, you must request a new code. · myeasydocs Access Code: [email or web address] Expires: 9/19/2018 10:09 AM 
 
 4. Enter the last four digits of your Social Security Number (xxxx) and Date of Birth (mm/dd/yyyy) as indicated and click Submit. You will be taken to the next sign-up page. 5. Create a [employer] ID. This will be your [employer] login ID and cannot be changed, so think of one that is secure and easy to remember. 6. Create a [employer] password. You can change your password at any time. 7. Enter your Password Reset Question and Answer. This can be used at a later time if you forget your password. 8. Enter your e-mail address. You will receive e-mail notification when new information is available in [address] 9. Click Sign Up. You can now view and download portions of your medical record. 10. Click the Download Summary menu link to download a portable copy of your medical information. If you have questions, please visit the Frequently Asked Questions section of the [employer] website. Remember, [employer] is NOT to be used for urgent needs. For medical emergencies, dial 911. Now available from your iPhone and Android! Please provide this summary of care documentation to your next provider. Your primary care clinician is listed as Robbie Echavarria. If you have any questions after today's visit, please call [phone].

## 2018-07-05 NOTE — PROGRESS NOTES
Chief Complaint   Patient presents with    Chest Congestion     Patient has been having chest congestion and productive cough x 2 weeks. Patient has tried OTC medication with no help. Patient is on antibiotic treatment currently for sinusitis prescribed by her ENT doctor. 1. Have you been to the ER, urgent care clinic since your last visit? Hospitalized since your last visit? No    2. Have you seen or consulted any other health care providers outside of the [address] [place] [address] since your last visit? Include any pap smears or colon screening. Yes, last week with Dr. Bean Estimable for cataract removal.     [facility]  Palma Javed comes in for follow-up care. Chronic cough: Patient has a chronic cough with chest congestion. Cough is productive of yellow phlegm. No fever or chills. Denies wheeze, hemoptysis or weight loss. Denies chest pain. Given the chronicity of the cough I will do a chest x-ray. This was done and is stable. I will await radiologist report. Meantime she will continue with the Mucinex that she has been taking for symptomatic relief. Obesity: Patient BMI is 30.54. She has tried lifestyle and dietary modification but none of this works in an effort to bring down her weight. We discussed medication management. We will discuss this further at her next visit. In the meantime I will refer her to the dietitian. States that she has done various dietary and lifestyle modifications. Sinusitis: Patient has a history of sinusitis. She has chronic frontal and maxillary sinus congestion with postnasal drainage. No fever or chills but she does have malaise and fatigue. She has been seen by the ENT doctor. She is on Augmentin and she will complete the course of antibiotics. Menopausal symptoms: Patient has symptoms of hot flashes, agitation, irritability and generalized discomfort that comes on and off. These have been ongoing for months. She is  in menopause.   She would like some medication to help control some of the symptoms that she states due to her menopause. I will put on Paxil. Acute stress disorder: Patient is acutely stressed. She is taking care of her mom and uncle who are not well. They also do have memory problems. This has stressed her out given the demanding nature of what she is doing. She does get depressed. She feels fatigued and with no energy. At times she cries by herself. I am putting her on Paxil. I will follow-up in a month. Gastrointestinal: Patient has history of GERD with persistent reflux symptoms. She does have bloating sensation. She also has changes in her bowel movements with constipation at the moment. In the past he has used Linzess. She is due to get a colonoscopy done on the 17th of this month. She has a family history of colon cancer. I will follow-up with those results. She can take MiraLAX daily for constipation as needed. She is on omeprazole for the reflux symptoms. Hypovitaminosis D: Patient is on vitamin D replacement therapy. She is out of medication. We will recheck labs today. I will send in a prescription. Dyslipidemia: Check lipid panel today. Patient is on atorvastatin. Would like a refill of prescription. This was sent in.     Past Medical History  Past Medical History:   Diagnosis Date    Acid reflux     Anemia     COPD (chronic obstructive pulmonary disease) (HCC)     Cystic disease of kidney     GERD (gastroesophageal reflux disease)     Goiter     Hemorrhoid     History of blood transfusion     HTN (hypertension)     Hyperlipidemia     Hypertension     Renal cyst        Surgical History  Past Surgical History:   Procedure Laterality Date    HX COLONOSCOPY      HX ENDOSCOPY      HX HEENT  2011    Partial thyroid    HX HYSTERECTOMY      HX ORTHOPAEDIC  2005    left shoulder sugery    HX PARTIAL HYSTERECTOMY  1990    HX THYROIDECTOMY          Medications  Current Outpatient Prescriptions   Medication Sig Dispense Refill    amoxicillin-clavulanate (AUGMENTIN) 875-125 mg per tablet       DUREZOL 0.05 % ophthalmic emulsion       ketorolac (ACULAR) 0.5 % ophthalmic solution       moxifloxacin (VIGAMOX) 0.5 % ophthalmic solution       predniSONE (DELTASONE) 10 mg tablet       ergocalciferol (ERGOCALCIFEROL) 50,000 unit capsule Take 1 Cap by mouth every seven (7) days. 12 Cap 1    aspirin delayed-release 81 mg tablet 81 mg.      atorvastatin (LIPITOR) 20 mg tablet Take 1 Tab by mouth daily. 30 Tab 2    montelukast (SINGULAIR) 10 mg tablet Take 1 Tab by mouth daily. 30 Tab 0    multivitamin (ONE A DAY) tablet Take 1 Tab by mouth daily.  albuterol (PROVENTIL HFA, VENTOLIN HFA, PROAIR HFA) 90 mcg/actuation inhaler Take  by inhalation.  omeprazole (PRILOSEC) 20 mg capsule Take 20 mg by mouth daily. Allergies  Allergies   Allergen Reactions    Latex, Natural Rubber Rash       Family History  Family History   Problem Relation Age of Onset    Diabetes Mother     Hypertension Mother     Cancer Father     Hypertension Father        Social History  Social History     Social History    Marital status: SINGLE     Spouse name: N/A    Number of children: N/A    Years of education: N/A     Occupational History    Not on file. Social History Main Topics    Smoking status: Current Every Day Smoker     Packs/day: 0.30     Years: 27.00     Types: Cigarettes    Smokeless tobacco: Never Used    Alcohol use No    Drug use: No    Sexual activity: No     Other Topics Concern    Not on file     Social History Narrative    ** Merged History Encounter **            Review of Systems  Review of Systems - Negative except as noted above in HPI.     Vital Signs  Visit Vitals    /77 (BP 1 Location: Left arm, BP Patient Position: Sitting)    Pulse 94    Temp 97 °F (36.1 °C) (Oral)    Resp 18    Ht 5' 2\" (1.575 m)    Wt 167 lb (75.8 kg)    LMP 05/08/1981    SpO2 96%    BMI 30.54 kg/m2 Physical Exam  Physical Examination: General appearance - oriented to person, place, and time, acyanotic, in no respiratory distress, well hydrated and anxious  Mental status - alert, oriented to person, place, and time, affect appropriate to mood  Mouth - mucous membranes moist, pharynx normal without lesions  Neck - supple, no significant adenopathy  Lymphatics - no palpable lymphadenopathy  Chest - clear to auscultation, no wheezes, rales or rhonchi, symmetric air entry  Heart - normal rate, regular rhythm, normal S1, S2, no murmurs, rubs, clicks or gallops  Abdomen - soft, nontender, nondistended, no masses or organomegaly  no rebound tenderness noted  bowel sounds normal  Back exam - limited range of motion  Neurological - alert, oriented, normal speech, no focal findings or movement disorder noted  Musculoskeletal - full range of motion without pain  Extremities - no pedal edema noted, intact peripheral pulses    Results  Results for orders placed or performed during the hospital encounter of 04/16/18   TSH 3RD GENERATION   Result Value Ref Range    TSH 1.20 0.36 - 3.74 uIU/mL   VITAMIN D, 25 HYDROXY   Result Value Ref Range    Vitamin D 25-Hydroxy 21.9 (L) 30 - 100 ng/mL   VITAMIN B12   Result Value Ref Range    Vitamin B12 812 211 - 911 pg/mL       ASSESSMENT and PLAN    ICD-10-CM ICD-9-CM    1. Chronic cough R05 786.2 CBC WITH AUTOMATED DIFF      XR CHEST PA LAT   2. Gastroesophageal reflux disease, esophagitis presence not specified K21.9 530.81    3. Chronic obstructive pulmonary disease, unspecified COPD type ([facility].) J44.9 496    4. Menopausal and female climacteric states N95.1 627.2 PARoxetine (PAXIL) 10 mg tablet   5. Obesity (BMI 30-39. 9) E66.9 278.00 REFERRAL TO DIETITIAN   6. Hypovitaminosis D E55.9 268.9 VITAMIN D, 25 HYDROXY      COLLECTION VENOUS BLOOD,VENIPUNCTURE      ergocalciferol (ERGOCALCIFEROL) 50,000 unit capsule   7.  Dyslipidemia E78.5 272.4 LIPID PANEL      COLLECTION VENOUS BLOOD,VENIPUNCTURE   8. Malaise and fatigue N35.09 652.56 METABOLIC PANEL, COMPREHENSIVE    R53.83  COLLECTION VENOUS BLOOD,VENIPUNCTURE     reviewed diet, exercise and weight control  reviewed medications and side effects in detail    I have discussed the diagnosis with the patient and the intended plan of care as seen in the above orders. The patient has received an after-visit summary and questions were answered concerning future plans. I have discussed medication, side effects, and warnings with the patient in detail. The patient verbalized understanding and is in agreement with the plan of care. The patient will contact the office with any additional concerns.     Vishal Mckeon MD

## 2018-07-06 LAB — 25(OH)D3 SERPL-MCNC: 83.8 NG/ML (ref 30–100)

## 2018-07-09 DIAGNOSIS — R05.3 CHRONIC COUGH: Primary | ICD-10-CM

## 2018-07-13 NOTE — PROGRESS NOTES
Please let patient know her vitamin D level is normal.  Her LDL cholesterol is slightly elevated. She is taking  Lipitor. She should  intensify exercise and cut out any polysaturated fats in her diet. One is still elevated then we would needs to of her liver enzymes is slightly elevated. I would recommend rechecking this in a month.   Abrahan Krause MD

## 2018-07-17 NOTE — PROGRESS NOTES
Informed patient of results at this time. Patient verbalized understanding at this time. Patient states she had her colonoscopy done today and she is doing well.  No other concerns voiced

## 2018-08-13 ENCOUNTER — HOSPITAL ENCOUNTER (OUTPATIENT)
Dept: LAB | Age: 63
Discharge: HOME | End: 2018-08-13
Payer: MEDICARE

## 2018-08-13 ENCOUNTER — OFFICE VISIT (OUTPATIENT)
Dept: FAMILY MEDICINE CLINIC | Age: 63
End: 2018-08-13

## 2018-08-13 VITALS
WEIGHT: 164 LBS | HEART RATE: 107 BPM | HEIGHT: 62 IN | OXYGEN SATURATION: 94 % | RESPIRATION RATE: 18 BRPM | SYSTOLIC BLOOD PRESSURE: 130 MMHG | TEMPERATURE: 97.3 F | BODY MASS INDEX: 30.18 KG/M2 | DIASTOLIC BLOOD PRESSURE: 76 MMHG

## 2018-08-13 DIAGNOSIS — R00.0 TACHYCARDIA: Primary | ICD-10-CM

## 2018-08-13 DIAGNOSIS — R53.81 MALAISE AND FATIGUE: ICD-10-CM

## 2018-08-13 DIAGNOSIS — E78.5 DYSLIPIDEMIA: ICD-10-CM

## 2018-08-13 DIAGNOSIS — R74.8 ELEVATED LIVER ENZYMES: ICD-10-CM

## 2018-08-13 DIAGNOSIS — R53.83 MALAISE AND FATIGUE: ICD-10-CM

## 2018-08-13 DIAGNOSIS — R05.9 COUGH: ICD-10-CM

## 2018-08-13 DIAGNOSIS — I10 ESSENTIAL HYPERTENSION: ICD-10-CM

## 2018-08-13 DIAGNOSIS — J44.9 CHRONIC OBSTRUCTIVE PULMONARY DISEASE, UNSPECIFIED COPD TYPE (HCC): ICD-10-CM

## 2018-08-13 DIAGNOSIS — N95.1 MENOPAUSAL AND FEMALE CLIMACTERIC STATES: ICD-10-CM

## 2018-08-13 DIAGNOSIS — R00.0 TACHYCARDIA: ICD-10-CM

## 2018-08-13 DIAGNOSIS — G47.00 INSOMNIA, UNSPECIFIED TYPE: ICD-10-CM

## 2018-08-13 LAB
ALBUMIN SERPL-MCNC: 4.1 G/DL (ref 3.4–5)
ALBUMIN/GLOB SERPL: 1.1 {RATIO} (ref 0.8–1.7)
ALP SERPL-CCNC: 90 U/L (ref 45–117)
ALT SERPL-CCNC: 31 U/L (ref 13–56)
AST SERPL-CCNC: 20 U/L (ref 15–37)
BASOPHILS # BLD: 0.1 K/UL (ref 0–0.1)
BASOPHILS NFR BLD: 1 % (ref 0–2)
BILIRUB DIRECT SERPL-MCNC: 0.1 MG/DL (ref 0–0.2)
BILIRUB SERPL-MCNC: 0.4 MG/DL (ref 0.2–1)
DIFFERENTIAL METHOD BLD: NORMAL
EOSINOPHIL # BLD: 0.2 K/UL (ref 0–0.4)
EOSINOPHIL NFR BLD: 3 % (ref 0–5)
ERYTHROCYTE [DISTWIDTH] IN BLOOD BY AUTOMATED COUNT: 13.8 % (ref 11.6–14.5)
GLOBULIN SER CALC-MCNC: 3.7 G/DL (ref 2–4)
HCT VFR BLD AUTO: 42.4 % (ref 35–45)
HGB BLD-MCNC: 13.4 G/DL (ref 12–16)
LYMPHOCYTES # BLD: 1.9 K/UL (ref 0.9–3.6)
LYMPHOCYTES NFR BLD: 23 % (ref 21–52)
MCH RBC QN AUTO: 28.2 PG (ref 24–34)
MCHC RBC AUTO-ENTMCNC: 31.6 G/DL (ref 31–37)
MCV RBC AUTO: 89.1 FL (ref 74–97)
MONOCYTES # BLD: 0.4 K/UL (ref 0.05–1.2)
MONOCYTES NFR BLD: 5 % (ref 3–10)
NEUTS SEG # BLD: 5.7 K/UL (ref 1.8–8)
NEUTS SEG NFR BLD: 68 % (ref 40–73)
PLATELET # BLD AUTO: 346 K/UL (ref 135–420)
PMV BLD AUTO: 10.9 FL (ref 9.2–11.8)
PROT SERPL-MCNC: 7.8 G/DL (ref 6.4–8.2)
RBC # BLD AUTO: 4.76 M/UL (ref 4.2–5.3)
TSH SERPL DL<=0.05 MIU/L-ACNC: 0.79 UIU/ML (ref 0.36–3.74)
WBC # BLD AUTO: 8.4 K/UL (ref 4.6–13.2)

## 2018-08-13 PROCEDURE — 80076 HEPATIC FUNCTION PANEL: CPT | Performed by: FAMILY MEDICINE

## 2018-08-13 PROCEDURE — 85025 COMPLETE CBC W/AUTO DIFF WBC: CPT | Performed by: FAMILY MEDICINE

## 2018-08-13 PROCEDURE — 84443 ASSAY THYROID STIM HORMONE: CPT | Performed by: FAMILY MEDICINE

## 2018-08-13 RX ORDER — PAROXETINE 10 MG/1
10 TABLET, FILM COATED ORAL DAILY
Qty: 30 TAB | Refills: 1 | Status: SHIPPED | OUTPATIENT
Start: 2018-08-13 | End: 2018-08-30 | Stop reason: DRUGHIGH

## 2018-08-13 NOTE — MR AVS SNAPSHOT
[facility] [address] [facility] 
[phone] Patient: Migel Hernandez MRN: [id number] [facility]:[birth year] Visit Information Date & Time Provider Department Dept. Phone Encounter #  
 8/13/2018  9:15 AM MD Tin Guzman [phone] [id number] Follow-up Instructions Return in about 2 weeks (around 8/27/2018), or if symptoms worsen or fail to improve, for malaise, fatigue, tachycardia. Your Appointments 12/26/2018 10:00 AM  
ROUTINE CARE with MD Tin Guzman ([facility]) Appt Note: for hypovitaminosis D, COPD  
 [address] [facility] [place] [address]  
  
   
 [address] Upcoming Health Maintenance Date Due Pneumococcal 19-64 Medium Risk (1 of 1 - PPSV23) 1/27/1973 DTaP/Tdap/Td series (1 - Tdap) 1/27/1975 FOBT Q 1 YEAR AGE 50-75 1/27/2004 ZOSTER VACCINE AGE 60> 11/27/2013 Influenza Age 5 to Adult 8/1/2018 MEDICARE YEARLY EXAM 2/9/2019 BREAST CANCER SCRN MAMMOGRAM 7/2/2020 Allergies as of 8/13/2018  Review Complete On: 8/13/2018 By: Abrahan Krause MD  
  
 Severity Noted Reaction Type Reactions Latex, Natural Rubber  07/16/2013   Side Effect Rash Current Immunizations  Reviewed on 1/15/2014 Name Date Influenza Vaccine 1/8/2014  8:24 AM  
  
 Not reviewed this visit You Were Diagnosed With   
  
 Codes Comments Tachycardia    -  Primary ICD-10-CM: R00.0 ICD-9-CM: 785.0 Menopausal and female climacteric states     ICD-10-CM: N95.1 ICD-9-CM: 627.2 Elevated liver enzymes     ICD-10-CM: R74.8 ICD-9-CM: 790.5 Cough     ICD-10-CM: R05 ICD-9-CM: 786.2 Essential hypertension     ICD-10-CM: I10 
ICD-9-CM: 401.9 Chronic obstructive pulmonary disease, unspecified COPD type ([facility]     ICD-10-CM: J44.9 ICD-9-CM: 365 Malaise and fatigue     ICD-10-CM: R53.81, R53.83 ICD-9-CM: 780.79 Insomnia, unspecified type     ICD-10-CM: G47.00 ICD-9-CM: 780.52 Dyslipidemia     ICD-10-CM: E78.5 ICD-9-CM: 272.4 Vitals BP Pulse Temp Resp Height(growth percentile) Weight(growth percentile) 130/76 (BP 1 Location: Left arm, BP Patient Position: Sitting) (!) 107 97.3 °F (36.3 °C) 18 5' 2\" (1.575 m) 164 lb (74.4 kg) LMP SpO2 BMI OB Status Smoking Status 05/08/1981 94% 30 kg/m2 Hysterectomy Current Every Day Smoker BMI and BSA Data Body Mass Index Body Surface Area  
 30 kg/m 2 1.8 m 2 Preferred Pharmacy Pharmacy Name Phone [address] [employer] [address] [facility], [address] [facility] Your Updated Medication List  
  
   
This list is accurate as of 8/13/18 10:05 AM.  Always use your most recent med list.  
  
  
  
  
 albuterol 90 mcg/actuation inhaler Commonly known as:  PROVENTIL HFA, VENTOLIN HFA, PROAIR HFA Take  by inhalation. aspirin delayed-release 81 mg tablet 81 mg.  
  
 DUREZOL 0.05 % ophthalmic emulsion Generic drug:  Difluprednate  
  
 ergocalciferol 50,000 unit capsule Commonly known as:  ERGOCALCIFEROL Take 1 Cap by mouth every seven (7) days. ketorolac 0.5 % ophthalmic solution Commonly known as:  ACULAR  
  
 montelukast 10 mg tablet Commonly known as:  SINGULAIR Take 1 Tab by mouth daily. moxifloxacin 0.5 % ophthalmic solution Commonly known as:  VIGAMOX  
  
 multivitamin tablet Commonly known as:  ONE A DAY Take 1 Tab by mouth daily. omeprazole 20 mg capsule Commonly known as:  PRILOSEC Take 20 mg by mouth daily. PARoxetine 10 mg tablet Commonly known as:  PAXIL Take 1 Tab by mouth daily. Indications: VASOMOTOR SYMPTOMS ASSOCIATED WITH MENOPAUSE Prescriptions Sent to Pharmacy Refills  PARoxetine (PAXIL) 10 mg tablet 1  
 Sig: Take 1 Tab by mouth daily. Indications: VASOMOTOR SYMPTOMS ASSOCIATED WITH MENOPAUSE Class: Normal  
 Pharmacy: [address] [place] Ph #: [phone] Route: Oral  
  
We Performed the Following AMB POC EKG ROUTINE W/ 12 LEADS, INTER & REP [11810 CPT(R)] Follow-up Instructions Return in about 2 weeks (around 8/27/2018), or if symptoms worsen or fail to improve, for malaise, fatigue, tachycardia. To-Do List   
 08/13/2018 Lab:  CBC WITH AUTOMATED DIFF   
  
 08/13/2018 Lab:  HEPATIC FUNCTION PANEL   
  
 08/13/2018 Lab:  HEPATIC FUNCTION PANEL   
  
 08/13/2018 Lab:  TSH 3RD GENERATION Introducing [facility]! Romayne Duster introduces [employer] patient portal. Now you can access parts of your medical record, email your doctor's office, and request medication refills online. 1. In your internet browser, go to [email or web address] 2. Click on the First Time User? Click Here link in the Sign In box. You will see the New Member Sign Up page. 3. Enter your [employer] Access Code exactly as it appears below. You will not need to use this code after youve completed the sign-up process. If you do not sign up before the expiration date, you must request a new code. · [employer] Access Code: [email or web address] Expires: 9/19/2018 10:09 AM 
 
4. Enter the last four digits of your Social Security Number (xxxx) and Date of Birth (mm/dd/yyyy) as indicated and click Submit. You will be taken to the next sign-up page. 5. Create a [employer] ID. This will be your [employer] login ID and cannot be changed, so think of one that is secure and easy to remember. 6. Create a [employer] password. You can change your password at any time. 7. Enter your Password Reset Question and Answer. This can be used at a later time if you forget your password. 8. Enter your e-mail address.  You will receive e-mail notification when new information is available in [employer]. 9. Click Sign Up. You can now view and download portions of your medical record. 10. Click the Download Summary menu link to download a portable copy of your medical information. If you have questions, please visit the Frequently Asked Questions section of the [employer] website. Remember, [employer] is NOT to be used for urgent needs. For medical emergencies, dial 911. Now available from your iPhone and Android! Please provide this summary of care documentation to your next provider. Your primary care clinician is listed as Robbie Echavarria. If you have any questions after today's visit, please call [phone].

## 2018-08-13 NOTE — PROGRESS NOTES
Chief Complaint   Patient presents with    Malaise    Fatigue    Menopause     1. Have you been to the ER, urgent care clinic since your last visit? Hospitalized since your last visit? No    2. Have you seen or consulted any other health care providers outside of the [facility] since your last visit? Include any pap smears or colon screening. No     HPI  Sissy Lau comes in for follow-up care. Cough/COPD: Patient has a history of COPD. Has been seen by the pulmonologist.  She is having a cough that is productive of clear phlegm. Occasional wheeze on and off. No fever or chills. Has pleuritic chest pain that comes on and off. No hemoptysis. Uses inhaler and this does cause some relief with the wheezing. She is due to see her pulmonologist.  We discussed her symptoms. For now she will take her inhalers. She will use over-the-counter cough medication. If symptoms persist may need treatment for COPD exacerbation with the antibiotics. For now however she would prefer to hold off since the inhalers improve her symptoms. Malaise and fatigue: Patient has malaise and fatigue. As noted above no fever or chills but does have the cough. We will check labs today. Insomnia: Patient has insomnia. She has trouble getting to sleep and when she sleeps this takes 4-5 hours. She wakes up around 430 or 5:00 in the morning. Feels that she does not get enough rest at night. Discussed various options including using melatonin for sleep and good sleep hygiene techniques. Tachycardia: Patient has tachycardia. Her pulse rate at the moment is 105. She does feel her heart racing and she is concerned about this. We did do an EKG that showed sinus tachycardia with a rate of 102. We will check labs. Did encourage her to keep well-hydrated. I will follow-up at next visit  Hypertension: Patient has hypertension. Blood pressure today is well controlled.   We will continue with the current treatment plan.  Menopause: Patient gets hot flashes on and off. She is on Paxil for menopausal symptoms. We will continue with this treatment. Past Medical History  Past Medical History:   Diagnosis Date    Acid reflux     Anemia     COPD (chronic obstructive pulmonary disease) (HCC)     Cystic disease of kidney     GERD (gastroesophageal reflux disease)     Goiter     Hemorrhoid     History of blood transfusion     HTN (hypertension)     Hyperlipidemia     Hypertension     Renal cyst        Surgical History  Past Surgical History:   Procedure Laterality Date    HX COLONOSCOPY      HX ENDOSCOPY      HX HEENT  2011    Partial thyroid    HX HYSTERECTOMY      HX ORTHOPAEDIC  2005    left shoulder sugery    HX PARTIAL HYSTERECTOMY  1990    HX THYROIDECTOMY          Medications  Current Outpatient Prescriptions   Medication Sig Dispense Refill    PARoxetine (PAXIL) 10 mg tablet Take 1 Tab by mouth daily. Indications: VASOMOTOR SYMPTOMS ASSOCIATED WITH MENOPAUSE 30 Tab 1    DUREZOL 0.05 % ophthalmic emulsion       ketorolac (ACULAR) 0.5 % ophthalmic solution       moxifloxacin (VIGAMOX) 0.5 % ophthalmic solution       ergocalciferol (ERGOCALCIFEROL) 50,000 unit capsule Take 1 Cap by mouth every seven (7) days. 12 Cap 1    aspirin delayed-release 81 mg tablet 81 mg.      montelukast (SINGULAIR) 10 mg tablet Take 1 Tab by mouth daily. 30 Tab 0    multivitamin (ONE A DAY) tablet Take 1 Tab by mouth daily.  albuterol (PROVENTIL HFA, VENTOLIN HFA, PROAIR HFA) 90 mcg/actuation inhaler Take  by inhalation.  omeprazole (PRILOSEC) 20 mg capsule Take 20 mg by mouth daily.          Allergies  Allergies   Allergen Reactions    Latex, Natural Rubber Rash       Family History  Family History   Problem Relation Age of Onset    Diabetes Mother     Hypertension Mother     Cancer Father     Hypertension Father        Social History  Social History     Social History    Marital status: SINGLE     Spouse name: N/A    Number of children: N/A    Years of education: N/A     Occupational History    Not on file.      Social History Main Topics    Smoking status: Current Every Day Smoker     Packs/day: 0.30     Years: 27.00     Types: Cigarettes    Smokeless tobacco: Never Used    Alcohol use No    Drug use: No    Sexual activity: No     Other Topics Concern    Not on file     Social History Narrative    ** Merged History Encounter **            Review of Systems  Review of Systems - History obtained from chart review and the patient  General ROS: positive for  - fatigue, malaise and sleep disturbance  negative for - chills or fever  Psychological ROS: positive for - anxiety, concentration difficulties and mood swings  Ophthalmic ROS: negative  ENT ROS: negative for - nasal discharge or sinus pain  Allergy and Immunology ROS: positive for - seasonal allergies  Hematological and Lymphatic ROS: negative  Endocrine ROS: negative  Respiratory ROS: positive for - cough and wheezing  negative for - hemoptysis or sputum changes  Cardiovascular ROS: no chest pain or dyspnea on exertion, has palpitations  Gastrointestinal ROS: no abdominal pain, change in bowel habits, or black or bloody stools  Genito-Urinary ROS: negative  Musculoskeletal ROS: negative  Neurological ROS: no TIA or stroke symptoms    Vital Signs  Visit Vitals    /76 (BP 1 Location: Left arm, BP Patient Position: Sitting)    Pulse (!) 107    Temp 97.3 °F (36.3 °C)    Resp 18    Ht 5' 2\" (1.575 m)    Wt 164 lb (74.4 kg)    LMP 05/08/1981    SpO2 94%    BMI 30 kg/m2         Physical Exam  Physical Examination: General appearance - oriented to person, place, and time, acyanotic, in no respiratory distress and well hydrated  Mental status - alert, oriented to person, place, and time, affect appropriate to mood  Mouth - mucous membranes moist, pharynx normal without lesions  Neck - supple, no significant adenopathy  Lymphatics - no palpable lymphadenopathy  Chest - clear to auscultation, no wheezes, rales or rhonchi, symmetric air entry, no tachypnea, retractions or cyanosis  Heart - S1 and S2 normal  Neurological - alert, oriented, normal speech, no focal findings or movement disorder noted  Musculoskeletal - osteoarthritic changes noted in both hands  Extremities - no pedal edema noted, intact peripheral pulses    Results  Results for orders placed or performed during the hospital encounter of 07/05/18   CBC WITH AUTOMATED DIFF   Result Value Ref Range    WBC 8.7 4.6 - 13.2 K/uL    RBC 4.43 4.20 - 5.30 M/uL    HGB 12.5 12.0 - 16.0 g/dL    HCT 39.7 35.0 - 45.0 %    MCV 89.6 74.0 - 97.0 FL    MCH 28.2 24.0 - 34.0 PG    MCHC 31.5 31.0 - 37.0 g/dL    RDW 14.2 11.6 - 14.5 %    PLATELET 948 339 - 330 K/uL    MPV 10.8 9.2 - 11.8 FL    NEUTROPHILS 62 40 - 73 %    LYMPHOCYTES 28 21 - 52 %    MONOCYTES 6 3 - 10 %    EOSINOPHILS 3 0 - 5 %    BASOPHILS 1 0 - 2 %    ABS. NEUTROPHILS 5.5 1.8 - 8.0 K/UL    ABS. LYMPHOCYTES 2.4 0.9 - 3.6 K/UL    ABS. MONOCYTES 0.5 0.05 - 1.2 K/UL    ABS. EOSINOPHILS 0.3 0.0 - 0.4 K/UL    ABS.  BASOPHILS 0.1 (H) 0.0 - 0.06 K/UL    DF AUTOMATED     LIPID PANEL   Result Value Ref Range    LIPID PROFILE          Cholesterol, total 174 <200 MG/DL    Triglyceride 131 <150 MG/DL    HDL Cholesterol 39 (L) 40 - 60 MG/DL    LDL, calculated 108.8 (H) 0 - 100 MG/DL    VLDL, calculated 26.2 MG/DL    CHOL/HDL Ratio 4.5 0 - 5.0     METABOLIC PANEL, COMPREHENSIVE   Result Value Ref Range    Sodium 140 136 - 145 mmol/L    Potassium 4.2 3.5 - 5.5 mmol/L    Chloride 105 100 - 108 mmol/L    CO2 26 21 - 32 mmol/L    Anion gap 9 3.0 - 18 mmol/L    Glucose 85 74 - 99 mg/dL    BUN 12 7.0 - 18 MG/DL    Creatinine 0.73 0.6 - 1.3 MG/DL    BUN/Creatinine ratio 16 12 - 20      GFR est AA >60 >60 ml/min/1.73m2    GFR est non-AA >60 >60 ml/min/1.73m2    Calcium 9.3 8.5 - 10.1 MG/DL    Bilirubin, total 0.3 0.2 - 1.0 MG/DL    ALT (SGPT) 63 (H) 13 - 56 U/L    AST (SGOT) 31 15 - 37 U/L    Alk. phosphatase 83 45 - 117 U/L    Protein, total 7.8 6.4 - 8.2 g/dL    Albumin 4.2 3.4 - 5.0 g/dL    Globulin 3.6 2.0 - 4.0 g/dL    A-G Ratio 1.2 0.8 - 1.7     VITAMIN D, 25 HYDROXY   Result Value Ref Range    Vitamin D 25-Hydroxy 83.8 30 - 100 ng/mL       ASSESSMENT and PLAN    ICD-10-CM ICD-9-CM    1. Tachycardia R00.0 785.0 AMB POC EKG ROUTINE W/ 12 LEADS, INTER & REP      TSH 3RD GENERATION   2. Menopausal and female climacteric states N95.1 627.2 PARoxetine (PAXIL) 10 mg tablet   3. Elevated liver enzymes R74.8 790.5 HEPATIC FUNCTION PANEL      HEPATIC FUNCTION PANEL   4. Cough R05 786.2    5. Essential hypertension I10 401.9    6. Chronic obstructive pulmonary disease, unspecified COPD type ([facility] [address] J44.9 496    7. Malaise and fatigue R53.81 780.79 TSH 3RD GENERATION    R53.83  CBC WITH AUTOMATED DIFF   8. Insomnia, unspecified type G47.00 780.52 TSH 3RD GENERATION   9. Dyslipidemia E78.5 272.4      lab results and schedule of future lab studies reviewed with patient  reviewed diet, exercise and weight control  reviewed medications and side effects in detail      I have discussed the diagnosis with the patient and the intended plan of care as seen in the above orders. The patient has received an after-visit summary and questions were answered concerning future plans. I have discussed medication, side effects, and warnings with the patient in detail. The patient verbalized understanding and is in agreement with the plan of care. The patient will contact the office with any additional concerns.     Hakan Delgado MD

## 2018-08-15 ENCOUNTER — TELEPHONE (OUTPATIENT)
Dept: FAMILY MEDICINE CLINIC | Age: 63
End: 2018-08-15

## 2018-08-15 NOTE — TELEPHONE ENCOUNTER
Patient want to know if Dr Chin Fernandez is putting in a prescription for her cholesterol since her cholesterol has increased, Please contact patient with advice concerning this matter

## 2018-08-16 DIAGNOSIS — E78.5 DYSLIPIDEMIA: Primary | ICD-10-CM

## 2018-08-16 RX ORDER — ATORVASTATIN CALCIUM 10 MG/1
10 TABLET, FILM COATED ORAL DAILY
Qty: 30 TAB | Refills: 2 | Status: SHIPPED | OUTPATIENT
Start: 2018-08-16 | End: 2018-11-27 | Stop reason: SDUPTHER

## 2018-08-16 NOTE — TELEPHONE ENCOUNTER
Please let patient know I will send in Lipitor to take once a day. Recheck lipid panel in 3 months.   Bobbi Horn MD

## 2018-08-30 ENCOUNTER — OFFICE VISIT (OUTPATIENT)
Dept: FAMILY MEDICINE CLINIC | Age: 63
End: 2018-08-30

## 2018-08-30 VITALS
OXYGEN SATURATION: 97 % | SYSTOLIC BLOOD PRESSURE: 132 MMHG | HEIGHT: 62 IN | BODY MASS INDEX: 30.36 KG/M2 | RESPIRATION RATE: 18 BRPM | DIASTOLIC BLOOD PRESSURE: 72 MMHG | TEMPERATURE: 97.3 F | HEART RATE: 93 BPM | WEIGHT: 165 LBS

## 2018-08-30 DIAGNOSIS — I10 ESSENTIAL HYPERTENSION: ICD-10-CM

## 2018-08-30 DIAGNOSIS — Z72.0 TOBACCO ABUSE: ICD-10-CM

## 2018-08-30 DIAGNOSIS — K21.9 GASTROESOPHAGEAL REFLUX DISEASE, ESOPHAGITIS PRESENCE NOT SPECIFIED: ICD-10-CM

## 2018-08-30 DIAGNOSIS — N95.1 MENOPAUSE SYNDROME: Primary | ICD-10-CM

## 2018-08-30 DIAGNOSIS — Z23 ENCOUNTER FOR IMMUNIZATION: ICD-10-CM

## 2018-08-30 DIAGNOSIS — E78.5 DYSLIPIDEMIA: ICD-10-CM

## 2018-08-30 RX ORDER — PAROXETINE HYDROCHLORIDE 20 MG/1
20 TABLET, FILM COATED ORAL DAILY
Qty: 30 TAB | Refills: 2 | Status: SHIPPED | OUTPATIENT
Start: 2018-08-30 | End: 2019-01-03 | Stop reason: SDUPTHER

## 2018-08-30 RX ORDER — FLUTICASONE FUROATE, UMECLIDINIUM BROMIDE AND VILANTEROL TRIFENATATE 100; 62.5; 25 UG/1; UG/1; UG/1
POWDER RESPIRATORY (INHALATION)
COMMUNITY
Start: 2018-08-13 | End: 2018-11-21

## 2018-08-30 NOTE — PROGRESS NOTES
Chief Complaint Patient presents with  Malaise  Fatigue  Palpitations  
  tachycardia follow-up 1. Have you been to the ER, urgent care clinic since your last visit? Hospitalized since your last visit? No 
 
2. Have you seen or consulted any other health care providers outside of the [address] [place] [address] since your last visit? Include any pap smears or colon screening. No  
 
HPI Migel Hernandez comes in for follow-up care. Menopausal symptoms: Patient has symptoms of menopause with hot flashes, malaise and fatigue. She takes Paxil and this helps with the symptoms. She would like a refill of medication. She feels that the 10 mg of Paxil is low. Initially it did help but now not as much as previously. She wonders about going up on the medication. I will increase to 20 mg daily. She will follow-up with me in a month or sooner as needed. Pulmonary: Patient has COPD. Has been followed up by the pulmonologist.  She is on inhaler medication. We will continue with treatment as recommended by the pulmonologist. 
Tobacco abuse: Patient continues to smoke. I did discuss the needs to quit smoking. She is aware about this but not ready to do so yet. We will continue to encourage her to quit smoking. Hypertension: Blood pressure is stable. GERD: Patient is on Prilosec. This helps with her heartburn. She is apprehensive as she has been seeing on the television warnings about Prilosec. We did discuss this. For now she will take the medication as needed. Dyslipidemia: Patient is on atorvastatin. Continue current treatment plan. Health maintenance: Patient will get her flu shot today. Past Medical History Past Medical History:  
Diagnosis Date  Acid reflux  Anemia  COPD (chronic obstructive pulmonary disease) (HCC)  Cystic disease of kidney  GERD (gastroesophageal reflux disease)  Goiter  Hemorrhoid  History of blood transfusion  HTN (hypertension)  Hyperlipidemia  Hypertension  Renal cyst   
 
 
Surgical History Past Surgical History:  
Procedure Laterality Date  HX COLONOSCOPY    
 HX ENDOSCOPY    
 HX HEENT  2011 Partial thyroid  HX HYSTERECTOMY  HX ORTHOPAEDIC  2005  
 left shoulder sugery  HX PARTIAL HYSTERECTOMY  1990  
 HX THYROIDECTOMY Medications Current Outpatient Prescriptions Medication Sig Dispense Refill  TRELEGY ELLIPTA 100-62.5-25 mcg dsdv  PARoxetine (PAXIL) 20 mg tablet Take 1 Tab by mouth daily. 30 Tab 2  
 atorvastatin (LIPITOR) 10 mg tablet Take 1 Tab by mouth daily. 30 Tab 2  
 DUREZOL 0.05 % ophthalmic emulsion  ketorolac (ACULAR) 0.5 % ophthalmic solution  moxifloxacin (VIGAMOX) 0.5 % ophthalmic solution  ergocalciferol (ERGOCALCIFEROL) 50,000 unit capsule Take 1 Cap by mouth every seven (7) days. 12 Cap 1  
 aspirin delayed-release 81 mg tablet 81 mg.    
 multivitamin (ONE A DAY) tablet Take 1 Tab by mouth daily.  albuterol (PROVENTIL HFA, VENTOLIN HFA, PROAIR HFA) 90 mcg/actuation inhaler Take  by inhalation.  omeprazole (PRILOSEC) 20 mg capsule Take 20 mg by mouth daily.  montelukast (SINGULAIR) 10 mg tablet Take 1 Tab by mouth daily. 30 Tab 0 Allergies Allergies Allergen Reactions  Latex, Natural Rubber Rash Family History Family History Problem Relation Age of Onset  Diabetes Mother  Hypertension Mother  Cancer Father  Hypertension Father Social History Social History Social History  Marital status: SINGLE Spouse name: N/A  
 Number of children: N/A  
 Years of education: N/A Occupational History  Not on file. Social History Main Topics  Smoking status: Current Every Day Smoker Packs/day: 0.30 Years: 27.00 Types: Cigarettes  Smokeless tobacco: Never Used  Alcohol use No  
 Drug use: No  
 Sexual activity: No  
 
Other Topics Concern  Not on file Social History Narrative ** Merged History Encounter ** Review of Systems Review of Systems - History obtained from chart review and the patient General ROS: positive for  - fatigue and malaise Psychological ROS: positive for - behavioral disorder and mood swings Ophthalmic ROS: negative ENT ROS: negative Allergy and Immunology ROS: negative Hematological and Lymphatic ROS: negative Endocrine ROS: negative Respiratory ROS: COPD Cardiovascular ROS: no chest pain or dyspnea on exertion Gastrointestinal ROS: no abdominal pain, change in bowel habits, or black or bloody stools Genito-Urinary ROS: no dysuria, trouble voiding, or hematuria Musculoskeletal ROS: positive for - joint pain Neurological ROS: negative Vital Signs Visit Vitals  /72 (BP 1 Location: Left arm, BP Patient Position: Sitting)  Pulse 93  Temp 97.3 °F (36.3 °C) (Oral)  Resp 18  Ht 5' 2\" (1.575 m)  Wt 165 lb (74.8 kg)  LMP 05/08/1981  SpO2 97%  BMI 30.18 kg/m2 Physical Exam 
Physical Examination: General appearance - oriented to person, place, and time and acyanotic, in no respiratory distress Mental status - affect appropriate to mood Mouth - mucous membranes moist, pharynx normal without lesions Neck - supple, no significant adenopathy Lymphatics - no palpable lymphadenopathy Chest - no tachypnea, retractions or cyanosis Heart - S1 and S2 normal 
Neurological - motor and sensory grossly normal bilaterally Musculoskeletal - osteoarthritic changes noted in both hands Extremities - no pedal edema noted, intact peripheral pulses Results Results for orders placed or performed during the hospital encounter of 08/13/18 HEPATIC FUNCTION PANEL Result Value Ref Range Protein, total 7.8 6.4 - 8.2 g/dL Albumin 4.1 3.4 - 5.0 g/dL Globulin 3.7 2.0 - 4.0 g/dL A-G Ratio 1.1 0.8 - 1.7 Bilirubin, total 0.4 0.2 - 1.0 MG/DL  Bilirubin, direct 0.1 0.0 - 0.2 MG/DL  
 Alk. phosphatase 90 45 - 117 U/L  
 AST (SGOT) 20 15 - 37 U/L  
 ALT (SGPT) 31 13 - 56 U/L  
TSH 3RD GENERATION Result Value Ref Range TSH 0.79 0.36 - 3.74 uIU/mL CBC WITH AUTOMATED DIFF Result Value Ref Range WBC 8.4 4.6 - 13.2 K/uL  
 RBC 4.76 4.20 - 5.30 M/uL  
 HGB 13.4 12.0 - 16.0 g/dL HCT 42.4 35.0 - 45.0 % MCV 89.1 74.0 - 97.0 FL  
 MCH 28.2 24.0 - 34.0 PG  
 MCHC 31.6 31.0 - 37.0 g/dL  
 RDW 13.8 11.6 - 14.5 % PLATELET 970 842 - 853 K/uL MPV 10.9 9.2 - 11.8 FL  
 NEUTROPHILS 68 40 - 73 % LYMPHOCYTES 23 21 - 52 % MONOCYTES 5 3 - 10 % EOSINOPHILS 3 0 - 5 % BASOPHILS 1 0 - 2 %  
 ABS. NEUTROPHILS 5.7 1.8 - 8.0 K/UL  
 ABS. LYMPHOCYTES 1.9 0.9 - 3.6 K/UL  
 ABS. MONOCYTES 0.4 0.05 - 1.2 K/UL  
 ABS. EOSINOPHILS 0.2 0.0 - 0.4 K/UL  
 ABS. BASOPHILS 0.1 0.0 - 0.1 K/UL  
 DF AUTOMATED ASSESSMENT and PLAN 
  ICD-10-CM ICD-9-CM 1. Menopause syndrome N95.1 627.2 PARoxetine (PAXIL) 20 mg tablet 2. Essential hypertension I10 401.9 3. Tobacco abuse Z72.0 305.1 4. Encounter for immunization Z23 V03.89 INFLUENZA VIRUS VAC QUAD,SPLIT,PRESV FREE SYRINGE IM  
   AL IMMUNIZ ADMIN,1 SINGLE/COMB VAC/TOXOID 5. Gastroesophageal reflux disease, esophagitis presence not specified K21.9 530.81   
6. Dyslipidemia E78.5 272.4   
 
reviewed diet, exercise and weight control 
reviewed medications and side effects in detail I have discussed the diagnosis with the patient and the intended plan of care as seen in the above orders. The patient has received an after-visit summary and questions were answered concerning future plans. I have discussed medication, side effects, and warnings with the patient in detail. The patient verbalized understanding and is in agreement with the plan of care. The patient will contact the office with any additional concerns.  
 
Timothy Lopes MD

## 2018-08-30 NOTE — MR AVS SNAPSHOT
[facility] [address] [facility] 
[phone] Patient: Makenzie Rueda MRN: [id number] BGB:[id number] Visit Information Date & Time Provider Department Dept. Phone Encounter #  
 8/30/2018  8:30 AM MD Tin Sen [phone] [id number] Follow-up Instructions Return in about 1 month (around 9/30/2018), or if symptoms worsen or fail to improve, for menopause, mood d/o. Your Appointments 12/26/2018 10:00 AM  
[facility] Tin [facility] Appt Note: for hypovitaminosis D, COPD  
 [address] [facility] [address]  
  
   
 [address] [facility] Upcoming Health Maintenance Date Due Pneumococcal 19-64 Medium Risk (1 of 1 - PPSV23) 1/27/1973 DTaP/Tdap/Td series (1 - Tdap) 1/27/1975 ZOSTER VACCINE AGE 60> 11/27/2013 Influenza Age 5 to Adult 8/1/2018 MEDICARE YEARLY EXAM 2/9/2019 BREAST CANCER SCRN MAMMOGRAM 7/2/2020 COLONOSCOPY 7/17/2021 Allergies as of 8/30/2018  Review Complete On: 8/30/2018 By: Carl Che MD  
  
 Severity Noted Reaction Type Reactions Latex, Natural Rubber  07/16/2013   Side Effect Rash Current Immunizations  Reviewed on 1/15/2014 Name Date Influenza Vaccine 1/8/2014  8:24 AM  
 Influenza Vaccine (Quad) PF  Incomplete Not reviewed this visit You Were Diagnosed With   
  
 Codes Comments Essential hypertension    -  Primary ICD-10-CM: I10 
ICD-9-CM: 401.9 Menopause syndrome     ICD-10-CM: N95.1 ICD-9-CM: 627.2 Tobacco abuse     ICD-10-CM: Z72.0 ICD-9-CM: 305.1 Encounter for immunization     ICD-10-CM: X41 ICD-9-CM: V03.89 Vitals BP Pulse Temp Resp Height(growth percentile) Weight(growth percentile) 132/72 (BP 1 Location: Left arm, BP Patient Position: Sitting) 93 97.3 °F (36.3 °C) (Oral) 18 5' 2\" (1.575 m) 165 lb (74.8 kg) LMP SpO2 BMI OB Status Smoking Status 05/08/1981 97% 30.18 kg/m2 Hysterectomy Current Every Day Smoker BMI and BSA Data Body Mass Index Body Surface Area  
 30.18 kg/m 2 1.81 m 2 Preferred Pharmacy Pharmacy Name Phone [address], [address] [facility] Your Updated Medication List  
  
   
This list is accurate as of 8/30/18  8:38 AM.  Always use your most recent med list.  
  
  
  
  
 albuterol 90 mcg/actuation inhaler Commonly known as:  PROVENTIL HFA, VENTOLIN HFA, PROAIR HFA Take  by inhalation. aspirin delayed-release 81 mg tablet 81 mg.  
  
 atorvastatin 10 mg tablet Commonly known as:  LIPITOR Take 1 Tab by mouth daily. DUREZOL 0.05 % ophthalmic emulsion Generic drug:  Difluprednate  
  
 ergocalciferol 50,000 unit capsule Commonly known as:  ERGOCALCIFEROL Take 1 Cap by mouth every seven (7) days. ketorolac 0.5 % ophthalmic solution Commonly known as:  ACULAR  
  
 montelukast 10 mg tablet Commonly known as:  SINGULAIR Take 1 Tab by mouth daily. moxifloxacin 0.5 % ophthalmic solution Commonly known as:  VIGAMOX  
  
 multivitamin tablet Commonly known as:  ONE A DAY Take 1 Tab by mouth daily. omeprazole 20 mg capsule Commonly known as:  PRILOSEC Take 20 mg by mouth daily. PARoxetine 20 mg tablet Commonly known as:  PAXIL Take 1 Tab by mouth daily. TRELEGY ELLIPTA 100-62.5-25 mcg Dsdv Generic drug:  fluticasone-umeclidin-vilanter Prescriptions Sent to Pharmacy Refills PARoxetine (PAXIL) 20 mg tablet 2 Sig: Take 1 Tab by mouth daily. Class: Normal  
 Pharmacy: [address] Ph #: [phone] Route: Oral  
  
We Performed the Following INFLUENZA VIRUS VAC QUAD,SPLIT,PRESV FREE SYRINGE IM [id number] CPT(R)] Follow-up Instructions Return in about 1 month (around 9/30/2018), or if symptoms worsen or fail to improve, for menopause, mood d/o. Introducing [facility]! Laure Morton introduces [employer] patient portal. Now you can access parts of your medical record, email your doctor's office, and request medication refills online. 1. In your internet browser, go to [email or web address] 2. Click on the First Time User? Click Here link in the Sign In box. You will see the New Member Sign Up page. 3. Enter your [employer] Access Code exactly as it appears below. You will not need to use this code after youve completed the sign-up process. If you do not sign up before the expiration date, you must request a new code. · [employer] Access Code: [email or web address] Expires: 9/19/2018 10:09 AM 
 
4. Enter the last four digits of your Social Security Number (xxxx) and Date of Birth (mm/dd/yyyy) as indicated and click Submit. You will be taken to the next sign-up page. 5. Create a [employer] ID. This will be your [employer] login ID and cannot be changed, so think of one that is secure and easy to remember. 6. Create a [employer] password. You can change your password at any time. 7. Enter your Password Reset Question and Answer. This can be used at a later time if you forget your password. 8. Enter your e-mail address. You will receive e-mail notification when new information is available in [address] 9. Click Sign Up. You can now view and download portions of your medical record. 10. Click the Download Summary menu link to download a portable copy of your medical information. If you have questions, please visit the Frequently Asked Questions section of the [employer] website. Remember, [employer] is NOT to be used for urgent needs. For medical emergencies, dial 911. Now available from your iPhone and Android! Please provide this summary of care documentation to your next provider. Your primary care clinician is listed as Robbie Echavarria. If you have any questions after today's visit, please call [phone].

## 2018-08-30 NOTE — PROGRESS NOTES
Patient was administered seasonal Influenza Immunization. 0.5ml was administered IM in right deltoid. Patient waited 15 minutes and no side effects observed. Explained side effects to patients and patient verbalized understanding.

## 2018-08-31 PROBLEM — E78.5 DYSLIPIDEMIA: Status: ACTIVE | Noted: 2018-08-31

## 2018-10-02 ENCOUNTER — OFFICE VISIT (OUTPATIENT)
Dept: FAMILY MEDICINE CLINIC | Age: 63
End: 2018-10-02

## 2018-10-02 VITALS
SYSTOLIC BLOOD PRESSURE: 108 MMHG | OXYGEN SATURATION: 96 % | HEART RATE: 85 BPM | RESPIRATION RATE: 18 BRPM | BODY MASS INDEX: 29.81 KG/M2 | DIASTOLIC BLOOD PRESSURE: 50 MMHG | HEIGHT: 62 IN | WEIGHT: 162 LBS | TEMPERATURE: 97.7 F

## 2018-10-02 DIAGNOSIS — Z78.0 MENOPAUSE: ICD-10-CM

## 2018-10-02 DIAGNOSIS — J32.4 PANSINUSITIS, UNSPECIFIED CHRONICITY: Primary | ICD-10-CM

## 2018-10-02 DIAGNOSIS — E78.5 DYSLIPIDEMIA: ICD-10-CM

## 2018-10-02 RX ORDER — LORATADINE AND PSEUDOEPHEDRINE 10; 240 MG/1; MG/1
1 TABLET, EXTENDED RELEASE ORAL DAILY
Qty: 10 TAB | Refills: 0 | Status: SHIPPED | OUTPATIENT
Start: 2018-10-02 | End: 2019-02-28

## 2018-10-02 RX ORDER — AMOXICILLIN AND CLAVULANATE POTASSIUM 875; 125 MG/1; MG/1
1 TABLET, FILM COATED ORAL EVERY 12 HOURS
Qty: 20 TAB | Refills: 0 | Status: SHIPPED | OUTPATIENT
Start: 2018-10-02 | End: 2018-10-12

## 2018-10-02 NOTE — MR AVS SNAPSHOT
[facility] [address] [place] 
[phone] Patient: Prateek Schmitz MRN: [id number] QUD:[id number] Visit Information Date & Time Provider Department Dept. Phone Encounter #  
 10/2/2018  4:30 PM MD Tin Sanders [phone] [id number] Follow-up Instructions Return if symptoms worsen or fail to improve. Your Appointments 10/2/2018  4:30 PM  
SAME DAY SICK with MD Tin Sanders ([facility]) Appt Note: sweating, cough, not feeling well  
 148 [facility] [address] [place]  
[phone]  
  
   
 [address]  
  
    
 12/26/2018 10:00 AM  
ROUTINE CARE with MD Tin Sanders ([facility]) Appt Note: for hypovitaminosis D, COPD  
 148 [facility] [address] [place] [address] Upcoming Health Maintenance Date Due Pneumococcal 19-64 Medium Risk (1 of 1 - PPSV23) 1/27/1973 DTaP/Tdap/Td series (1 - Tdap) 1/27/1975 Shingrix Vaccine Age 50> (1 of 2) 1/27/2004 MEDICARE YEARLY EXAM 2/9/2019 BREAST CANCER SCRN MAMMOGRAM 7/2/2020 COLONOSCOPY 7/17/2021 Allergies as of 10/2/2018  Review Complete On: 10/2/2018 By: Farhan Tinoco MD  
  
 Severity Noted Reaction Type Reactions Latex, Natural Rubber  07/16/2013   Side Effect Rash Current Immunizations  Reviewed on 1/15/2014 Name Date Influenza Vaccine 1/8/2014  8:24 AM  
 Influenza Vaccine (Quad) PF 8/30/2018 Not reviewed this visit You Were Diagnosed With   
  
 Codes Comments Pansinusitis, unspecified chronicity    -  Primary ICD-10-CM: J32.4 ICD-9-CM: 473.8 Menopause     ICD-10-CM: Z78.0 ICD-9-CM: 627.2 Dyslipidemia     ICD-10-CM: E78.5 ICD-9-CM: 272.4 Vitals BP Pulse Temp Resp Height(growth percentile) Weight(growth percentile) 108/50 (BP 1 Location: Left arm, BP Patient Position: Sitting) 85 97.7 °F (36.5 °C) (Oral) 18 5' 2\" (1.575 m) 162 lb (73.5 kg) LMP SpO2 BMI OB Status Smoking Status 05/08/1981 96% 29.63 kg/m2 Hysterectomy Current Every Day Smoker BMI and BSA Data Body Mass Index Body Surface Area  
 29.63 kg/m 2 1.79 m 2 Preferred Pharmacy Pharmacy Name Phone [address], [address] [facility] Your Updated Medication List  
  
   
This list is accurate as of 10/2/18  3:54 PM.  Always use your most recent med list.  
  
  
  
  
 albuterol 90 mcg/actuation inhaler Commonly known as:  PROVENTIL HFA, VENTOLIN HFA, PROAIR HFA Take  by inhalation. amoxicillin-clavulanate 875-125 mg per tablet Commonly known as:  AUGMENTIN Take 1 Tab by mouth every twelve (12) hours for 10 days. aspirin delayed-release 81 mg tablet 81 mg.  
  
 atorvastatin 10 mg tablet Commonly known as:  LIPITOR Take 1 Tab by mouth daily. ergocalciferol 50,000 unit capsule Commonly known as:  ERGOCALCIFEROL Take 1 Cap by mouth every seven (7) days. loratadine-pseudoephedrine  mg per tablet Commonly known as:  CLARITIN-D 24 HOUR Take 1 Tab by mouth daily. multivitamin tablet Commonly known as:  ONE A DAY Take 1 Tab by mouth daily. omeprazole 20 mg capsule Commonly known as:  PRILOSEC Take 20 mg by mouth daily. PARoxetine 20 mg tablet Commonly known as:  PAXIL Take 1 Tab by mouth daily. TRELEGY ELLIPTA 100-62.5-25 mcg Dsdv Generic drug:  fluticasone-umeclidin-vilanter Prescriptions Sent to Pharmacy Refills  
 amoxicillin-clavulanate (AUGMENTIN) 875-125 mg per tablet 0 Sig: Take 1 Tab by mouth every twelve (12) hours for 10 days.   
 Class: Normal  
 Pharmacy: [address] Ph #: [phone] Route: Oral  
 loratadine-pseudoephedrine (CLARITIN-D 24 HOUR)  mg per tablet 0 Sig: Take 1 Tab by mouth daily. Class: Normal  
 Pharmacy: [address] Ph #: [phone] Route: Oral  
  
Follow-up Instructions Return if symptoms worsen or fail to improve. Introducing [facility] SERVICES! [employer] introduces [employer] patient portal. Now you can access parts of your medical record, email your doctor's office, and request medication refills online. 1. In your internet browser, go to [email or web address] 2. Click on the First Time User? Click Here link in the Sign In box. You will see the New Member Sign Up page. 3. Enter your [employer] Access Code exactly as it appears below. You will not need to use this code after youve completed the sign-up process. If you do not sign up before the expiration date, you must request a new code. · [employer] Access Code: [email or web address] Expires: 12/31/2018  3:51 PM 
 
4. Enter the last four digits of your Social Security Number (xxxx) and Date of Birth (mm/dd/yyyy) as indicated and click Submit. You will be taken to the next sign-up page. 5. Create a [employer] ID. This will be your [employer] login ID and cannot be changed, so think of one that is secure and easy to remember. 6. Create a [employer] password. You can change your password at any time. 7. Enter your Password Reset Question and Answer. This can be used at a later time if you forget your password. 8. Enter your e-mail address. You will receive e-mail notification when new information is available in [address] 9. Click Sign Up. You can now view and download portions of your medical record. 10. Click the Download Summary menu link to download a portable copy of your medical information. If you have questions, please visit the Frequently Asked Questions section of the [employer] website. Remember, [employer] is NOT to be used for urgent needs. For medical emergencies, dial 911. Now available from your iPhone and Android! Please provide this summary of care documentation to your next provider. Your primary care clinician is listed as Robbie Echavarria. If you have any questions after today's visit, please call [phone].

## 2018-10-02 NOTE — PROGRESS NOTES
Chief Complaint Patient presents with  Cold Symptoms  
  started on 09-  Cough  
  dark green and thick white sputum  Headache 1. Have you been to the ER, urgent care clinic since your last visit? Hospitalized since your last visit? No 
 
2. Have you seen or consulted any other health care providers outside of the [address] [place] [address] since your last visit? Include any pap smears or colon screening. No  
 
HPI Lena Bowling comes in for acute care. Patient has maxillary and frontal sinus pressure. She has postnasal drainage that is mucopurulent in nature. Has fever and chills. Has malaise and fatigue. These have been ongoing for the past 3 weeks. Started off as cold symptoms with nasal congestion and rhinorrhea. These have progressed gradually. She has pressure in the sinuses. Also has a cough that is productive of mucopurulent phlegm. No wheeze, chest pain, hemoptysis or shortness of breath. Does have a headache that comes on and off. Has tried taking over-the-counter medications but these have not helped thus comes in for evaluation. Patient does have hot flashes and we put her on Paxil at an increased dose of 20 mg. This has seemed to help. She will continue with the medication. Patient has dyslipidemia. She is taking Lipitor. She is tolerating the medication. Past Medical History Past Medical History:  
Diagnosis Date  Acid reflux  Anemia  COPD (chronic obstructive pulmonary disease) (HCC)  Cystic disease of kidney  GERD (gastroesophageal reflux disease)  Goiter  Hemorrhoid  History of blood transfusion  HTN (hypertension)  Hyperlipidemia  Hypertension  Renal cyst   
 
 
Surgical History Past Surgical History:  
Procedure Laterality Date  HX COLONOSCOPY    
 HX ENDOSCOPY    
 HX HEENT  2011 Partial thyroid  HX HYSTERECTOMY  HX ORTHOPAEDIC  2005  
 left shoulder sugery  HX PARTIAL HYSTERECTOMY  1990  
 HX THYROIDECTOMY Medications Current Outpatient Prescriptions Medication Sig Dispense Refill  amoxicillin-clavulanate (AUGMENTIN) 875-125 mg per tablet Take 1 Tab by mouth every twelve (12) hours for 10 days. 20 Tab 0  
 loratadine-pseudoephedrine (CLARITIN-D 24 HOUR)  mg per tablet Take 1 Tab by mouth daily. 10 Tab 0  
 TRELEGY ELLIPTA 100-62.5-25 mcg dsdv  PARoxetine (PAXIL) 20 mg tablet Take 1 Tab by mouth daily. 30 Tab 2  
 atorvastatin (LIPITOR) 10 mg tablet Take 1 Tab by mouth daily. 30 Tab 2  
 ergocalciferol (ERGOCALCIFEROL) 50,000 unit capsule Take 1 Cap by mouth every seven (7) days. 12 Cap 1  
 aspirin delayed-release 81 mg tablet 81 mg.    
 multivitamin (ONE A DAY) tablet Take 1 Tab by mouth daily.  albuterol (PROVENTIL HFA, VENTOLIN HFA, PROAIR HFA) 90 mcg/actuation inhaler Take  by inhalation.  omeprazole (PRILOSEC) 20 mg capsule Take 20 mg by mouth daily. Allergies Allergies Allergen Reactions  Latex, Natural Rubber Rash Family History Family History Problem Relation Age of Onset  Diabetes Mother  Hypertension Mother  Cancer Father  Hypertension Father Social History Social History Social History  Marital status: SINGLE Spouse name: N/A  
 Number of children: N/A  
 Years of education: N/A Occupational History  Not on file. Social History Main Topics  Smoking status: Current Every Day Smoker Packs/day: 0.30 Years: 27.00 Types: Cigarettes  Smokeless tobacco: Never Used  Alcohol use No  
 Drug use: No  
 Sexual activity: No  
 
Other Topics Concern  Not on file Social History Narrative ** Merged History Encounter ** Review of Systems Review of Systems - All systems are negative except as mentioned above in the HPI. Vital Signs Visit Vitals  /50 (BP 1 Location: Left arm, BP Patient Position: Sitting)  Pulse 85  Temp 97.7 °F (36.5 °C) (Oral)  Resp 18  Ht 5' 2\" (1.575 m)  Wt 162 lb (73.5 kg)  LMP 05/08/1981  SpO2 96%  BMI 29.63 kg/m2 Physical Exam 
Physical Examination: General appearance - oriented to person, place, and time, acyanotic, in no respiratory distress and well hydrated Mental status - alert, oriented to person, place, and time, affect appropriate to mood Eyes - sclera anicteric Ears - bilateral TM's and external ear canals normal 
Nose - mucosal congestion, mucosal erythema, purulent rhinorrhea and sinus tenderness noted Maxillary and frontal 
Mouth - mucous membranes moist, pharynx normal without lesions Neck - supple, no significant adenopathy Lymphatics - no palpable lymphadenopathy Chest - clear to auscultation, no wheezes, rales or rhonchi, symmetric air entry Heart - S1 and S2 normal 
Neurological - neck supple without rigidity Results Results for orders placed or performed during the hospital encounter of 08/13/18 HEPATIC FUNCTION PANEL Result Value Ref Range Protein, total 7.8 6.4 - 8.2 g/dL Albumin 4.1 3.4 - 5.0 g/dL Globulin 3.7 2.0 - 4.0 g/dL A-G Ratio 1.1 0.8 - 1.7 Bilirubin, total 0.4 0.2 - 1.0 MG/DL Bilirubin, direct 0.1 0.0 - 0.2 MG/DL Alk. phosphatase 90 45 - 117 U/L  
 AST (SGOT) 20 15 - 37 U/L  
 ALT (SGPT) 31 13 - 56 U/L  
TSH 3RD GENERATION Result Value Ref Range TSH 0.79 0.36 - 3.74 uIU/mL CBC WITH AUTOMATED DIFF Result Value Ref Range WBC 8.4 4.6 - 13.2 K/uL  
 RBC 4.76 4.20 - 5.30 M/uL  
 HGB 13.4 12.0 - 16.0 g/dL HCT 42.4 35.0 - 45.0 % MCV 89.1 74.0 - 97.0 FL  
 MCH 28.2 24.0 - 34.0 PG  
 MCHC 31.6 31.0 - 37.0 g/dL  
 RDW 13.8 11.6 - 14.5 % PLATELET 381 708 - 189 K/uL MPV 10.9 9.2 - 11.8 FL  
 NEUTROPHILS 68 40 - 73 % LYMPHOCYTES 23 21 - 52 % MONOCYTES 5 3 - 10 % EOSINOPHILS 3 0 - 5 % BASOPHILS 1 0 - 2 % ABS. NEUTROPHILS 5.7 1.8 - 8.0 K/UL  
 ABS. LYMPHOCYTES 1.9 0.9 - 3.6 K/UL  
 ABS. MONOCYTES 0.4 0.05 - 1.2 K/UL  
 ABS. EOSINOPHILS 0.2 0.0 - 0.4 K/UL  
 ABS. BASOPHILS 0.1 0.0 - 0.1 K/UL  
 DF AUTOMATED ASSESSMENT and PLAN 
  ICD-10-CM ICD-9-CM 1. Pansinusitis, unspecified chronicity J32.4 473.8 amoxicillin-clavulanate (AUGMENTIN) 875-125 mg per tablet  
   loratadine-pseudoephedrine (CLARITIN-D 24 HOUR)  mg per tablet 2. Menopause Z78.0 627.2 3. Dyslipidemia E78.5 272.4   
 
reviewed diet, exercise and weight control 
reviewed medications and side effects in detail I have discussed the diagnosis with the patient and the intended plan of care as seen in the above orders. The patient has received an after-visit summary and questions were answered concerning future plans. I have discussed medication, side effects, and warnings with the patient in detail. The patient verbalized understanding and is in agreement with the plan of care. The patient will contact the office with any additional concerns.  
 
Neda Escobar MD

## 2018-10-29 ENCOUNTER — TELEPHONE (OUTPATIENT)
Dept: FAMILY MEDICINE CLINIC | Age: 63
End: 2018-10-29

## 2018-10-29 NOTE — TELEPHONE ENCOUNTER
Patient called requesting a refill on Augmentin. She states she is still having headaches and coughing up green stuff. Patient confirms pharmacy: [address]  Patient aware of 24-48 hour turn around.

## 2018-10-31 RX ORDER — AMOXICILLIN AND CLAVULANATE POTASSIUM 875; 125 MG/1; MG/1
1 TABLET, FILM COATED ORAL EVERY 12 HOURS
Qty: 20 TAB | Refills: 0 | Status: SHIPPED | OUTPATIENT
Start: 2018-10-31 | End: 2018-11-10

## 2018-10-31 NOTE — TELEPHONE ENCOUNTER
Patient was called and verified by Name and . Patient was informed that a prescription was sent to pharmacy as requested.

## 2018-11-16 ENCOUNTER — CLINICAL SUPPORT (OUTPATIENT)
Dept: FAMILY MEDICINE CLINIC | Age: 63
End: 2018-11-16

## 2018-11-16 DIAGNOSIS — Z48.00 DRESSING CHANGE: Primary | ICD-10-CM

## 2018-11-16 NOTE — PROGRESS NOTES
Pt here for nurse visit to have bandaid applied to her post-procedure site to right groin. Area to right groin remains dry, no open area noted at this time. No drainage or swelling noted. Scant amt of bruising noted. No c/o pain to area at this time. bandaid applied to area at this time.

## 2018-11-21 ENCOUNTER — OFFICE VISIT (OUTPATIENT)
Dept: FAMILY MEDICINE CLINIC | Age: 63
End: 2018-11-21

## 2018-11-21 VITALS
HEIGHT: 62 IN | WEIGHT: 167 LBS | SYSTOLIC BLOOD PRESSURE: 124 MMHG | HEART RATE: 90 BPM | OXYGEN SATURATION: 93 % | TEMPERATURE: 97 F | RESPIRATION RATE: 18 BRPM | DIASTOLIC BLOOD PRESSURE: 72 MMHG | BODY MASS INDEX: 30.73 KG/M2

## 2018-11-21 DIAGNOSIS — E78.5 DYSLIPIDEMIA: ICD-10-CM

## 2018-11-21 DIAGNOSIS — E66.9 OBESITY (BMI 30-39.9): ICD-10-CM

## 2018-11-21 DIAGNOSIS — J44.9 CHRONIC OBSTRUCTIVE PULMONARY DISEASE, UNSPECIFIED COPD TYPE (HCC): Primary | ICD-10-CM

## 2018-11-21 DIAGNOSIS — N95.1 MENOPAUSAL AND FEMALE CLIMACTERIC STATES: ICD-10-CM

## 2018-11-21 RX ORDER — BENZONATATE 100 MG/1
100 CAPSULE ORAL
COMMUNITY
End: 2018-11-21 | Stop reason: SDUPTHER

## 2018-11-21 RX ORDER — BENZONATATE 100 MG/1
100 CAPSULE ORAL
Qty: 60 CAP | Refills: 0 | Status: SHIPPED | OUTPATIENT
Start: 2018-11-21 | End: 2019-01-03 | Stop reason: SDUPTHER

## 2018-11-21 NOTE — PATIENT INSTRUCTIONS
Body Mass Index: Care Instructions Your Care Instructions Body mass index (BMI) can help you see if your weight is raising your risk for health problems. It uses a formula to compare how much you weigh with how tall you are. · A BMI lower than 18.5 is considered underweight. · A BMI between 18.5 and 24.9 is considered healthy. · A BMI between 25 and 29.9 is considered overweight. A BMI of 30 or higher is considered obese. If your BMI is in the normal range, it means that you have a lower risk for weight-related health problems. If your BMI is in the overweight or obese range, you may be at increased risk for weight-related health problems, such as high blood pressure, heart disease, stroke, arthritis or joint pain, and diabetes. If your BMI is in the underweight range, you may be at increased risk for health problems such as fatigue, lower protection (immunity) against illness, muscle loss, bone loss, hair loss, and hormone problems. BMI is just one measure of your risk for weight-related health problems. You may be at higher risk for health problems if you are not active, you eat an unhealthy diet, or you drink too much alcohol or use tobacco products. Follow-up care is a key part of your treatment and safety. Be sure to make and go to all appointments, and call your doctor if you are having problems. It's also a good idea to know your test results and keep a list of the medicines you take. How can you care for yourself at home? · Practice healthy eating habits. This includes eating plenty of fruits, vegetables, whole grains, lean protein, and low-fat dairy. · If your doctor recommends it, get more exercise. Walking is a good choice. Bit by bit, increase the amount you walk every day. Try for at least 30 minutes on most days of the week. · Do not smoke. Smoking can increase your risk for health problems.  If you need help quitting, talk to your doctor about stop-smoking programs and medicines. These can increase your chances of quitting for good. · Limit alcohol to 2 drinks a day for men and 1 drink a day for women. Too much alcohol can cause health problems. If you have a BMI higher than 25 · Your doctor may do other tests to check your risk for weight-related health problems. This may include measuring the distance around your waist. A waist measurement of more than 40 inches in men or 35 inches in women can increase the risk of weight-related health problems. · Talk with your doctor about steps you can take to stay healthy or improve your health. You may need to make lifestyle changes to lose weight and stay healthy, such as changing your diet and getting regular exercise. If you have a BMI lower than 18.5 · Your doctor may do other tests to check your risk for health problems. · Talk with your doctor about steps you can take to stay healthy or improve your health. You may need to make lifestyle changes to gain or maintain weight and stay healthy, such as getting more healthy foods in your diet and doing exercises to build muscle. Where can you learn more? Go to http://mireya-lisa.info/. Enter S176 in the search box to learn more about \"Body Mass Index: Care Instructions. \" Current as of: October 13, 2016 Content Version: 11.4 © 4445-0869 [employer]. Care instructions adapted under license by [employer] (which disclaims liability or warranty for this information). If you have questions about a medical condition or this instruction, always ask your healthcare professional. [address] any warranty or liability for your use of this information.

## 2018-11-21 NOTE — PROGRESS NOTES
Chief Complaint Patient presents with  Follow-up D/C from [facility] on 11/14/2018 for COPD  Medication Refill 1. Have you been to the ER, urgent care clinic since your last visit? Hospitalized since your last visit? Yes [facility] for COPD 2. Have you seen or consulted any other health care providers outside of the [address] [place] [address] since your last visit? Include any pap smears or colon screening. No  
 
HPI aRfat Kate comes in for follow-up care. Patient was admitted at [facility] from 11/12/18 to 11/14/18 for COPD with exacerbation. She had elevated troponins and was taken for cardiac catheterization. This was stable. She was sent home on oxygen. She is on inhaler medications. Has since weaned herself off the oxygen. She does get occasional shortness of breath but is not using oxygen. She does have a cough that is productive of clear phlegm. No hemoptysis. She denies chest pain or chest tightness. She has an occasional wheeze. She was put on a course of prednisone. Overall he feels improved. Denies fever or chills. She comes in for follow-up care. She has run out of [employer] that she uses to help with the cough. She would like a refill of this. She does have a follow-up with a pulmonologist.  I encouraged her to continue with the medication as prescribed. Patient has a history of dyslipidemia and takes atorvastatin. She is tolerating the medication. She also has a history of mood disorder. She is on Paxil. This also helps with her postmenopausal symptoms. We will have her continue with current treatment plan. Patient has a BMI of 30.54. This is obesity. I discussed normal BMI with her. She will do lifestyle and dietary modification in an effort to bring down her weight. Past Medical History Past Medical History:  
Diagnosis Date  Acid reflux  Anemia  COPD (chronic obstructive pulmonary disease) (HCC)  Cystic disease of kidney  GERD (gastroesophageal reflux disease)  Goiter  Hemorrhoid  History of blood transfusion  HTN (hypertension)  Hyperlipidemia  Hypertension  Renal cyst   
 
 
Surgical History Past Surgical History:  
Procedure Laterality Date  HX COLONOSCOPY    
 HX ENDOSCOPY    
 HX HEENT  2011 Partial thyroid  HX HYSTERECTOMY  HX ORTHOPAEDIC  2005  
 left shoulder sugery  HX PARTIAL HYSTERECTOMY  1990  
 HX THYROIDECTOMY Medications Current Outpatient Medications Medication Sig Dispense Refill  umeclidinium-vilanterol (ANORO ELLIPTA) 62.5-25 mcg/actuation inhaler Take 1 Puff by inhalation daily.  benzonatate (TESSALON) 100 mg capsule Take 100 mg by mouth three (3) times daily as needed for Cough.  loratadine-pseudoephedrine (CLARITIN-D 24 HOUR)  mg per tablet Take 1 Tab by mouth daily. 10 Tab 0  
 PARoxetine (PAXIL) 20 mg tablet Take 1 Tab by mouth daily. 30 Tab 2  
 atorvastatin (LIPITOR) 10 mg tablet Take 1 Tab by mouth daily. 30 Tab 2  
 ergocalciferol (ERGOCALCIFEROL) 50,000 unit capsule Take 1 Cap by mouth every seven (7) days. 12 Cap 1  
 aspirin delayed-release 81 mg tablet 81 mg.    
 albuterol (PROVENTIL HFA, VENTOLIN HFA, PROAIR HFA) 90 mcg/actuation inhaler Take  by inhalation.  omeprazole (PRILOSEC) 20 mg capsule Take 20 mg by mouth daily.  TRELEGY ELLIPTA 100-62.5-25 mcg dsdv  multivitamin (ONE A DAY) tablet Take 1 Tab by mouth daily. Allergies Allergies Allergen Reactions  Latex, Natural Rubber Rash Family History Family History Problem Relation Age of Onset  Diabetes Mother  Hypertension Mother  Cancer Father  Hypertension Father Social History Social History Socioeconomic History  Marital status: SINGLE Spouse name: Not on file  Number of children: Not on file  Years of education: Not on file  Highest education level: Not on file Social Needs  Financial resource strain: Not on file  Food insecurity - worry: Not on file  Food insecurity - inability: Not on file  Transportation needs - medical: Not on file  Transportation needs - non-medical: Not on file Occupational History  Not on file Tobacco Use  Smoking status: Former Smoker Packs/day: 0.30 Years: 27.00 Pack years: 8.10 Types: Cigarettes  Smokeless tobacco: Never Used Substance and Sexual Activity  Alcohol use: No  
  Alcohol/week: 0.0 oz  Drug use: No  
 Sexual activity: No  
Other Topics Concern  Not on file Social History Narrative ** Merged History Encounter ** Review of Systems Review of Systems -review of all systems is negative except as noted above in the HPI. Vital Signs Visit Vitals /72 (BP 1 Location: Left arm, BP Patient Position: Sitting) Pulse 90 Temp 97 °F (36.1 °C) (Oral) Resp 18 Ht 5' 2\" (1.575 m) Wt 167 lb (75.8 kg) LMP 05/08/1981 SpO2 93% BMI 30.54 kg/m² Physical Exam 
Physical Examination: General appearance - alert, well appearing, and in no distress, oriented to person, place, and time and acyanotic, in no respiratory distress Mental status - alert, oriented to person, place, and time, affect appropriate to mood Ears - hearing grossly normal bilaterally Nose - normal and patent, no erythema, discharge or polyps Mouth - mucous membranes moist, pharynx normal without lesions Neck - supple, no significant adenopathy Lymphatics - no palpable lymphadenopathy Chest - clear to auscultation, no wheezes, rales or rhonchi, symmetric air entry Heart - normal rate and regular rhythm, S1 and S2 normal 
Neurological - alert, oriented, normal speech, no focal findings or movement disorder noted Extremities - intact peripheral pulses Results Results for orders placed or performed during the hospital encounter of 08/13/18 HEPATIC FUNCTION PANEL Result Value Ref Range Protein, total 7.8 6.4 - 8.2 g/dL Albumin 4.1 3.4 - 5.0 g/dL Globulin 3.7 2.0 - 4.0 g/dL A-G Ratio 1.1 0.8 - 1.7 Bilirubin, total 0.4 0.2 - 1.0 MG/DL Bilirubin, direct 0.1 0.0 - 0.2 MG/DL Alk. phosphatase 90 45 - 117 U/L  
 AST (SGOT) 20 15 - 37 U/L  
 ALT (SGPT) 31 13 - 56 U/L  
TSH 3RD GENERATION Result Value Ref Range TSH 0.79 0.36 - 3.74 uIU/mL CBC WITH AUTOMATED DIFF Result Value Ref Range WBC 8.4 4.6 - 13.2 K/uL  
 RBC 4.76 4.20 - 5.30 M/uL  
 HGB 13.4 12.0 - 16.0 g/dL HCT 42.4 35.0 - 45.0 % MCV 89.1 74.0 - 97.0 FL  
 MCH 28.2 24.0 - 34.0 PG  
 MCHC 31.6 31.0 - 37.0 g/dL  
 RDW 13.8 11.6 - 14.5 % PLATELET 677 082 - 292 K/uL MPV 10.9 9.2 - 11.8 FL  
 NEUTROPHILS 68 40 - 73 % LYMPHOCYTES 23 21 - 52 % MONOCYTES 5 3 - 10 % EOSINOPHILS 3 0 - 5 % BASOPHILS 1 0 - 2 %  
 ABS. NEUTROPHILS 5.7 1.8 - 8.0 K/UL  
 ABS. LYMPHOCYTES 1.9 0.9 - 3.6 K/UL  
 ABS. MONOCYTES 0.4 0.05 - 1.2 K/UL  
 ABS. EOSINOPHILS 0.2 0.0 - 0.4 K/UL  
 ABS. BASOPHILS 0.1 0.0 - 0.1 K/UL  
 DF AUTOMATED ASSESSMENT and PLAN 
  ICD-10-CM ICD-9-CM 1. Chronic obstructive pulmonary disease, unspecified COPD type ([facility] J44.9 496 2. Dyslipidemia E78.5 272.4 3. Obesity (BMI 30-39. 9) E66.9 278.00   
4. Menopausal and female climacteric states N95.1 627.2 Discussed the patient's BMI with her. The BMI follow up plan is as follows:  
dietary management education, guidance, and counseling 
encourage exercise 
monitor weight 
reviewed diet, exercise and weight control 
reviewed medications and side effects in detail I have discussed the diagnosis with the patient and the intended plan of care as seen in the above orders. The patient has received an after-visit summary and questions were answered concerning future plans.  I have discussed medication, side effects, and warnings with the patient in detail. The patient verbalized understanding and is in agreement with the plan of care. The patient will contact the office with any additional concerns.  
 
Jeremy Marinelli MD

## 2018-11-27 DIAGNOSIS — E78.5 DYSLIPIDEMIA: ICD-10-CM

## 2018-11-27 RX ORDER — ATORVASTATIN CALCIUM 10 MG/1
10 TABLET, FILM COATED ORAL DAILY
Qty: 30 TAB | Refills: 2 | Status: SHIPPED | OUTPATIENT
Start: 2018-11-27 | End: 2018-12-26 | Stop reason: SDUPTHER

## 2018-11-27 NOTE — TELEPHONE ENCOUNTER
Requested Prescriptions     Pending Prescriptions Disp Refills    atorvastatin (LIPITOR) 10 mg tablet 30 Tab 2     Sig: Take 1 Tab by mouth daily. Patient stated she doesn't know if she have any more refills on her Vit D. Please evaluate her medication list, did not see Vit D listed.

## 2018-12-26 ENCOUNTER — OFFICE VISIT (OUTPATIENT)
Dept: FAMILY MEDICINE CLINIC | Age: 63
End: 2018-12-26

## 2018-12-26 VITALS
HEART RATE: 93 BPM | SYSTOLIC BLOOD PRESSURE: 136 MMHG | WEIGHT: 170 LBS | BODY MASS INDEX: 31.28 KG/M2 | OXYGEN SATURATION: 96 % | TEMPERATURE: 96.2 F | DIASTOLIC BLOOD PRESSURE: 74 MMHG | HEIGHT: 62 IN | RESPIRATION RATE: 16 BRPM

## 2018-12-26 DIAGNOSIS — E78.5 DYSLIPIDEMIA: ICD-10-CM

## 2018-12-26 DIAGNOSIS — J44.9 CHRONIC OBSTRUCTIVE PULMONARY DISEASE, UNSPECIFIED COPD TYPE (HCC): Primary | ICD-10-CM

## 2018-12-26 DIAGNOSIS — J32.4 PANSINUSITIS, UNSPECIFIED CHRONICITY: ICD-10-CM

## 2018-12-26 DIAGNOSIS — N95.1 MENOPAUSE SYNDROME: ICD-10-CM

## 2018-12-26 DIAGNOSIS — K21.9 GASTROESOPHAGEAL REFLUX DISEASE, ESOPHAGITIS PRESENCE NOT SPECIFIED: ICD-10-CM

## 2018-12-26 RX ORDER — FLUTICASONE PROPIONATE 50 MCG
2 SPRAY, SUSPENSION (ML) NASAL
COMMUNITY
Start: 2018-11-15 | End: 2018-12-26 | Stop reason: SDUPTHER

## 2018-12-26 RX ORDER — FLUTICASONE PROPIONATE 50 MCG
2 SPRAY, SUSPENSION (ML) NASAL DAILY
Qty: 1 BOTTLE | Refills: 1 | Status: SHIPPED | OUTPATIENT
Start: 2018-12-26 | End: 2019-03-26 | Stop reason: SDUPTHER

## 2018-12-26 RX ORDER — ATORVASTATIN CALCIUM 10 MG/1
10 TABLET, FILM COATED ORAL DAILY
Qty: 30 TAB | Refills: 2 | Status: SHIPPED | OUTPATIENT
Start: 2018-12-26 | End: 2019-05-13 | Stop reason: SDUPTHER

## 2018-12-26 NOTE — PROGRESS NOTES
Chief Complaint   Patient presents with    COPD     1. Have you been to the ER, urgent care clinic since your last visit? Hospitalized since your last visit? No    2. Have you seen or consulted any other health care providers outside of the [address] [place] [address] since your last visit? Include any pap smears or colon screening. Yes, pulmonology and ENT    HPI  Nila Almazan comes in for follow-up care. Patient has COPD. She is being followed up by the pulmonologist.  Currently has a pulse oximetry being done at home and she will follow-up with this when she sees the pulmonologist.  She has been on home oxygen. Would prefer not to use any of the home oxygen. She does get short of breath with activity. She denies chest pain or diaphoresis. Advised to continue with her inhaler medications. She also has a nebulizer that she uses as needed. Patient has dyslipidemia. She is on atorvastatin. Would like a refill of the medication. Plan to recheck her lipid panel at next visit. She will continue with lifestyle and dietary modification. Patient has chronic sinusitis. She has been followed up by the ENT physician. Has had balloon rhinoplasty done in the past.  Continues to have congestion of the nasal passages and sinuses with drainage that is copious. It is not bloody. She is on Flonase. She would like a refill of this. Has recently had a CT scan of her sinuses done. She will follow-up with the ENT physician on this. Patient has a history of mood disorder. She is on Paxil. This also helps with menopausal symptoms. Advised to continue with the medication. She does have heartburn that comes on and off. She is taking omeprazole for this. Continue with this medication. I will follow-up in 3 months.        Past Medical History  Past Medical History:   Diagnosis Date    Acid reflux     Anemia     COPD (chronic obstructive pulmonary disease) (HCC)     Cystic disease of kidney     GERD (gastroesophageal reflux disease)     Goiter     Hemorrhoid     History of blood transfusion     HTN (hypertension)     Hyperlipidemia     Hypertension     Renal cyst        Surgical History  Past Surgical History:   Procedure Laterality Date    HX COLONOSCOPY      HX ENDOSCOPY      HX HEENT  2011    Partial thyroid    HX HYSTERECTOMY      HX ORTHOPAEDIC  2005    left shoulder sugery    HX PARTIAL HYSTERECTOMY  1990    HX THYROIDECTOMY          Medications  Current Outpatient Medications   Medication Sig Dispense Refill    fluticasone (FLONASE) 50 mcg/actuation nasal spray 2 Sprays.  atorvastatin (LIPITOR) 10 mg tablet Take 1 Tab by mouth daily. 30 Tab 2    umeclidinium-vilanterol (ANORO ELLIPTA) 62.5-25 mcg/actuation inhaler Take 1 Puff by inhalation daily.  benzonatate (TESSALON) 100 mg capsule Take 1 Cap by mouth three (3) times daily as needed for Cough. 60 Cap 0    loratadine-pseudoephedrine (CLARITIN-D 24 HOUR)  mg per tablet Take 1 Tab by mouth daily. 10 Tab 0    PARoxetine (PAXIL) 20 mg tablet Take 1 Tab by mouth daily. 30 Tab 2    ergocalciferol (ERGOCALCIFEROL) 50,000 unit capsule Take 1 Cap by mouth every seven (7) days. 12 Cap 1    aspirin delayed-release 81 mg tablet 81 mg.      albuterol (PROVENTIL HFA, VENTOLIN HFA, PROAIR HFA) 90 mcg/actuation inhaler Take  by inhalation.  omeprazole (PRILOSEC) 20 mg capsule Take 20 mg by mouth daily.          Allergies  Allergies   Allergen Reactions    Latex, Natural Rubber Rash       Family History  Family History   Problem Relation Age of Onset    Diabetes Mother     Hypertension Mother     Cancer Father     Hypertension Father        Social History  Social History     Socioeconomic History    Marital status: SINGLE     Spouse name: Not on file    Number of children: Not on file    Years of education: Not on file    Highest education level: Not on file   Social Needs    Financial resource strain: Not on file   [address] [place] [address] Food insecurity - worry: Not on file    Food insecurity - inability: Not on file    Transportation needs - medical: Not on file   [employer] needs - non-medical: Not on file   Occupational History    Not on file   Tobacco Use    Smoking status: Former Smoker     Packs/day: 0.30     Years: 27.00     Pack years: 8.10     Types: Cigarettes    Smokeless tobacco: Never Used   Substance and Sexual Activity    Alcohol use: No     Alcohol/week: 0.0 oz    Drug use: No    Sexual activity: No   Other Topics Concern    Not on file   Social History Narrative    ** Merged History Encounter **            Review of Systems  Review of Systems - History obtained from chart review and the patient  General ROS: positive for  - fatigue and malaise  Psychological ROS: positive for - mood swings  Ophthalmic ROS: positive for - decreased vision  ENT ROS: positive for - nasal congestion, nasal discharge, nasal polyps and sinus pain  Allergy and Immunology ROS: positive for - nasal congestion and postnasal drip  Hematological and Lymphatic ROS: negative  Endocrine ROS: negative  Respiratory ROS: positive for - shortness of breath and wheezing  Cardiovascular ROS: negative  Gastrointestinal ROS: no abdominal pain, change in bowel habits, or black or bloody stools  Genito-Urinary ROS: negative  Musculoskeletal ROS: negative  Neurological ROS: negative    Vital Signs  Visit Vitals  /74 (BP 1 Location: Left arm, BP Patient Position: Sitting)   Pulse 93   Temp 96.2 °F (35.7 °C) (Oral)   Resp 16   Ht 5' 2\" (1.575 m)   Wt 170 lb (77.1 kg)   LMP 05/08/1981   SpO2 96%   BMI 31.09 kg/m²         Physical Exam  Physical Examination: General appearance - oriented to person, place, and time, overweight, acyanotic, in no respiratory distress and well hydrated  Mental status - alert, oriented to person, place, and time, affect appropriate to mood  Nose - normal and patent, no erythema, discharge or polyps  Mouth - mucous membranes moist, pharynx normal without lesions  Neck - supple, no significant adenopathy  Lymphatics - no palpable lymphadenopathy, no hepatosplenomegaly  Chest - clear to auscultation, no wheezes, rales or rhonchi, symmetric air entry, no tachypnea, retractions or cyanosis  Heart - normal rate and regular rhythm, S1 and S2 normal  Back exam - limited range of motion  Neurological - alert, oriented, normal speech, no focal findings or movement disorder noted  Musculoskeletal - no muscular tenderness noted    Results  Results for orders placed or performed during the hospital encounter of 08/13/18   HEPATIC FUNCTION PANEL   Result Value Ref Range    Protein, total 7.8 6.4 - 8.2 g/dL    Albumin 4.1 3.4 - 5.0 g/dL    Globulin 3.7 2.0 - 4.0 g/dL    A-G Ratio 1.1 0.8 - 1.7      Bilirubin, total 0.4 0.2 - 1.0 MG/DL    Bilirubin, direct 0.1 0.0 - 0.2 MG/DL    Alk. phosphatase 90 45 - 117 U/L    AST (SGOT) 20 15 - 37 U/L    ALT (SGPT) 31 13 - 56 U/L   TSH 3RD GENERATION   Result Value Ref Range    TSH 0.79 0.36 - 3.74 uIU/mL   CBC WITH AUTOMATED DIFF   Result Value Ref Range    WBC 8.4 4.6 - 13.2 K/uL    RBC 4.76 4.20 - 5.30 M/uL    HGB 13.4 12.0 - 16.0 g/dL    HCT 42.4 35.0 - 45.0 %    MCV 89.1 74.0 - 97.0 FL    MCH 28.2 24.0 - 34.0 PG    MCHC 31.6 31.0 - 37.0 g/dL    RDW 13.8 11.6 - 14.5 %    PLATELET 117 295 - 715 K/uL    MPV 10.9 9.2 - 11.8 FL    NEUTROPHILS 68 40 - 73 %    LYMPHOCYTES 23 21 - 52 %    MONOCYTES 5 3 - 10 %    EOSINOPHILS 3 0 - 5 %    BASOPHILS 1 0 - 2 %    ABS. NEUTROPHILS 5.7 1.8 - 8.0 K/UL    ABS. LYMPHOCYTES 1.9 0.9 - 3.6 K/UL    ABS. MONOCYTES 0.4 0.05 - 1.2 K/UL    ABS. EOSINOPHILS 0.2 0.0 - 0.4 K/UL    ABS. BASOPHILS 0.1 0.0 - 0.1 K/UL    DF AUTOMATED         ASSESSMENT and PLAN    ICD-10-CM ICD-9-CM    1. Chronic obstructive pulmonary disease, unspecified COPD type ([facility].) J44.9 496    2. Dyslipidemia E78.5 272.4 atorvastatin (LIPITOR) 10 mg tablet   3. Pansinusitis, unspecified chronicity J32.4 473.8    4. Menopause syndrome N95.1 627.2    5. Gastroesophageal reflux disease, esophagitis presence not specified K21.9 530.81      lab results and schedule of future lab studies reviewed with patient  reviewed diet, exercise and weight control  reviewed medications and side effects in detail  radiology results and schedule of future radiology studies reviewed with patient      I have discussed the diagnosis with the patient and the intended plan of care as seen in the above orders. The patient has received an after-visit summary and questions were answered concerning future plans. I have discussed medication, side effects, and warnings with the patient in detail. The patient verbalized understanding and is in agreement with the plan of care. The patient will contact the office with any additional concerns.     Daniel Hoff MD

## 2018-12-27 ENCOUNTER — TELEPHONE (OUTPATIENT)
Dept: FAMILY MEDICINE CLINIC | Age: 63
End: 2018-12-27

## 2018-12-27 RX ORDER — AMOXICILLIN 500 MG/1
500 CAPSULE ORAL 3 TIMES DAILY
Qty: 30 CAP | Refills: 0 | Status: SHIPPED | OUTPATIENT
Start: 2018-12-27 | End: 2019-01-06

## 2018-12-27 NOTE — TELEPHONE ENCOUNTER
Patient called stating she was seen yesterday and is getting a cold. She is asking for antibiotics to be called in because she says she is quick to catch a sinus infection. Please advise.

## 2019-01-03 DIAGNOSIS — N95.1 MENOPAUSE SYNDROME: ICD-10-CM

## 2019-01-03 NOTE — TELEPHONE ENCOUNTER
Requested Prescriptions     Pending Prescriptions Disp Refills    benzonatate (TESSALON) 100 mg capsule 60 Cap 0     Sig: Take 1 Cap by mouth three (3) times daily as needed for Cough.  PARoxetine (PAXIL) 20 mg tablet 30 Tab 2     Sig: Take 1 Tab by mouth daily. [facility]  Patient aware of 24-48 hour turn around.

## 2019-01-04 RX ORDER — BENZONATATE 100 MG/1
100 CAPSULE ORAL
Qty: 60 CAP | Refills: 0 | Status: SHIPPED | OUTPATIENT
Start: 2019-01-04 | End: 2019-02-28

## 2019-01-04 RX ORDER — PAROXETINE HYDROCHLORIDE 20 MG/1
20 TABLET, FILM COATED ORAL DAILY
Qty: 30 TAB | Refills: 2 | Status: SHIPPED | OUTPATIENT
Start: 2019-01-04 | End: 2019-02-28 | Stop reason: SDUPTHER

## 2019-01-17 ENCOUNTER — TELEPHONE (OUTPATIENT)
Dept: FAMILY MEDICINE CLINIC | Age: 64
End: 2019-01-17

## 2019-01-17 NOTE — TELEPHONE ENCOUNTER
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 She said she is starting to feel achy and she took her last antibiotic on Sunday and wanted to know if she could be prescribed something before it gets too worse for her. Patient asked to put this in as high priority.     Patient confirms pharmacy: [facility]  Patient aware of 24-48 hr turnaround

## 2019-01-17 NOTE — TELEPHONE ENCOUNTER
Patient was called and was verified by name and . Patient was informed that Dr Anjum Shipman was gone for the day and is off on Friday. Encouraged patient to go to ER if symptom persists or worsens, however, patient stated she think she can  wait for Dr Anjum Shipman to read message on Monday.

## 2019-01-21 NOTE — TELEPHONE ENCOUNTER
Gave patient advice at this time. Patient was satisfied with advice at this time.  No other concerns voiced at this time

## 2019-01-21 NOTE — TELEPHONE ENCOUNTER
Please let patient know that this would likely be viral upper respiratory infection. She should do supportive care for it. She just completed an antibiotic and does not need to take another one. She should take her Claritin-D and Flonase. She should take adequate amount of fluid. If symptoms persist or get worse then she should come in for evaluation.   Carol Morton MD

## 2019-02-28 ENCOUNTER — HOSPITAL ENCOUNTER (OUTPATIENT)
Dept: LAB | Age: 64
Discharge: HOME | End: 2019-02-28
Payer: MEDICARE

## 2019-02-28 ENCOUNTER — OFFICE VISIT (OUTPATIENT)
Dept: FAMILY MEDICINE CLINIC | Age: 64
End: 2019-02-28

## 2019-02-28 VITALS
RESPIRATION RATE: 18 BRPM | OXYGEN SATURATION: 98 % | HEIGHT: 62 IN | TEMPERATURE: 97.6 F | HEART RATE: 98 BPM | DIASTOLIC BLOOD PRESSURE: 98 MMHG | WEIGHT: 168.2 LBS | BODY MASS INDEX: 30.95 KG/M2 | SYSTOLIC BLOOD PRESSURE: 149 MMHG

## 2019-02-28 DIAGNOSIS — M81.0 POST-MENOPAUSAL OSTEOPOROSIS: ICD-10-CM

## 2019-02-28 DIAGNOSIS — Z23 ENCOUNTER FOR IMMUNIZATION: ICD-10-CM

## 2019-02-28 DIAGNOSIS — Z00.00 ENCOUNTER FOR MEDICARE ANNUAL WELLNESS EXAM: ICD-10-CM

## 2019-02-28 DIAGNOSIS — G89.29 CHRONIC INTRACTABLE HEADACHE, UNSPECIFIED HEADACHE TYPE: ICD-10-CM

## 2019-02-28 DIAGNOSIS — R05.9 COUGH: ICD-10-CM

## 2019-02-28 DIAGNOSIS — G89.29 CHRONIC INTRACTABLE HEADACHE, UNSPECIFIED HEADACHE TYPE: Primary | ICD-10-CM

## 2019-02-28 DIAGNOSIS — Z71.89 ACP (ADVANCE CARE PLANNING): ICD-10-CM

## 2019-02-28 DIAGNOSIS — R51.9 CHRONIC INTRACTABLE HEADACHE, UNSPECIFIED HEADACHE TYPE: ICD-10-CM

## 2019-02-28 DIAGNOSIS — E55.9 HYPOVITAMINOSIS D: ICD-10-CM

## 2019-02-28 DIAGNOSIS — E78.5 DYSLIPIDEMIA: ICD-10-CM

## 2019-02-28 DIAGNOSIS — R51.9 CHRONIC INTRACTABLE HEADACHE, UNSPECIFIED HEADACHE TYPE: Primary | ICD-10-CM

## 2019-02-28 DIAGNOSIS — N95.1 MENOPAUSE SYNDROME: ICD-10-CM

## 2019-02-28 LAB
ALBUMIN SERPL-MCNC: 4.5 G/DL (ref 3.4–5)
ALBUMIN/GLOB SERPL: 1.4 {RATIO} (ref 0.8–1.7)
ALP SERPL-CCNC: 96 U/L (ref 45–117)
ALT SERPL-CCNC: 43 U/L (ref 13–56)
ANION GAP SERPL CALC-SCNC: 7 MMOL/L (ref 3–18)
AST SERPL-CCNC: 28 U/L (ref 15–37)
BASOPHILS # BLD: 0.1 K/UL (ref 0–0.1)
BASOPHILS NFR BLD: 1 % (ref 0–2)
BILIRUB SERPL-MCNC: 0.3 MG/DL (ref 0.2–1)
BUN SERPL-MCNC: 15 MG/DL (ref 7–18)
BUN/CREAT SERPL: 18 (ref 12–20)
CALCIUM SERPL-MCNC: 9.3 MG/DL (ref 8.5–10.1)
CHLORIDE SERPL-SCNC: 103 MMOL/L (ref 100–108)
CHOLEST SERPL-MCNC: 198 MG/DL
CO2 SERPL-SCNC: 27 MMOL/L (ref 21–32)
CREAT SERPL-MCNC: 0.84 MG/DL (ref 0.6–1.3)
DIFFERENTIAL METHOD BLD: NORMAL
EOSINOPHIL # BLD: 0.3 K/UL (ref 0–0.4)
EOSINOPHIL NFR BLD: 4 % (ref 0–5)
ERYTHROCYTE [DISTWIDTH] IN BLOOD BY AUTOMATED COUNT: 14.2 % (ref 11.6–14.5)
GLOBULIN SER CALC-MCNC: 3.3 G/DL (ref 2–4)
GLUCOSE SERPL-MCNC: 77 MG/DL (ref 74–99)
HCT VFR BLD AUTO: 42.4 % (ref 35–45)
HDLC SERPL-MCNC: 48 MG/DL (ref 40–60)
HDLC SERPL: 4.1 {RATIO} (ref 0–5)
HGB BLD-MCNC: 13.2 G/DL (ref 12–16)
LDLC SERPL CALC-MCNC: 118 MG/DL (ref 0–100)
LIPID PROFILE,FLP: ABNORMAL
LYMPHOCYTES # BLD: 1.7 K/UL (ref 0.9–3.6)
LYMPHOCYTES NFR BLD: 22 % (ref 21–52)
MCH RBC QN AUTO: 28.3 PG (ref 24–34)
MCHC RBC AUTO-ENTMCNC: 31.1 G/DL (ref 31–37)
MCV RBC AUTO: 90.8 FL (ref 74–97)
MONOCYTES # BLD: 0.5 K/UL (ref 0.05–1.2)
MONOCYTES NFR BLD: 7 % (ref 3–10)
NEUTS SEG # BLD: 5.2 K/UL (ref 1.8–8)
NEUTS SEG NFR BLD: 66 % (ref 40–73)
PLATELET # BLD AUTO: 357 K/UL (ref 135–420)
PMV BLD AUTO: 10.9 FL (ref 9.2–11.8)
POTASSIUM SERPL-SCNC: 4.6 MMOL/L (ref 3.5–5.5)
PROT SERPL-MCNC: 7.8 G/DL (ref 6.4–8.2)
RBC # BLD AUTO: 4.67 M/UL (ref 4.2–5.3)
SODIUM SERPL-SCNC: 137 MMOL/L (ref 136–145)
TRIGL SERPL-MCNC: 160 MG/DL (ref ?–150)
VLDLC SERPL CALC-MCNC: 32 MG/DL
WBC # BLD AUTO: 7.8 K/UL (ref 4.6–13.2)

## 2019-02-28 PROCEDURE — 80061 LIPID PANEL: CPT

## 2019-02-28 PROCEDURE — 82306 VITAMIN D 25 HYDROXY: CPT

## 2019-02-28 PROCEDURE — 80053 COMPREHEN METABOLIC PANEL: CPT

## 2019-02-28 PROCEDURE — 85025 COMPLETE CBC W/AUTO DIFF WBC: CPT

## 2019-02-28 RX ORDER — NAPROXEN 500 MG/1
500 TABLET ORAL EVERY 12 HOURS
COMMUNITY
End: 2019-02-28

## 2019-02-28 RX ORDER — BENZONATATE 200 MG/1
200 CAPSULE ORAL
Qty: 30 CAP | Refills: 2 | Status: SHIPPED | OUTPATIENT
Start: 2019-02-28 | End: 2019-05-07 | Stop reason: SDUPTHER

## 2019-02-28 RX ORDER — BUTALBITAL, ACETAMINOPHEN AND CAFFEINE 50; 325; 40 MG/1; MG/1; MG/1
1 TABLET ORAL
Qty: 30 TAB | Refills: 1 | Status: SHIPPED | OUTPATIENT
Start: 2019-02-28 | End: 2019-03-26 | Stop reason: SDUPTHER

## 2019-02-28 RX ORDER — PAROXETINE HYDROCHLORIDE 20 MG/1
20 TABLET, FILM COATED ORAL DAILY
Qty: 30 TAB | Refills: 2 | Status: SHIPPED | OUTPATIENT
Start: 2019-02-28 | End: 2019-04-16 | Stop reason: SDUPTHER

## 2019-02-28 NOTE — PROGRESS NOTES
Chief Complaint Patient presents with [facility] Annual Wellness Visit  Sinus Pain  
  and concern 1. Have you been to the ER, urgent care clinic since your last visit? Hospitalized since your last visit? No 
 
2. Have you seen or consulted any other health care providers outside of the [address] [place] [address] since your last visit? Include any pap smears or colon screening. No 
This is the Subsequent Medicare Annual Wellness Exam, performed 12 months or more after the Initial AWV or the last Subsequent AWV I have reviewed the patient's medical history in detail and updated the computerized patient record. History Liz Ross comes in for medicare wellness visit. Past Medical History:  
Diagnosis Date  Acid reflux  Anemia  COPD (chronic obstructive pulmonary disease) (HCC)  Cystic disease of kidney  GERD (gastroesophageal reflux disease)  Goiter  Hemorrhoid  History of blood transfusion  HTN (hypertension)  Hyperlipidemia  Hypertension  Renal cyst   
  
Past Surgical History:  
Procedure Laterality Date  HX COLONOSCOPY    
 HX ENDOSCOPY    
 HX HEENT  2011 Partial thyroid  HX HYSTERECTOMY  HX ORTHOPAEDIC  2005  
 left shoulder sugery  HX PARTIAL HYSTERECTOMY  1990  
 HX THYROIDECTOMY Current Outpatient Medications Medication Sig Dispense Refill  PARoxetine (PAXIL) 20 mg tablet Take 1 Tab by mouth daily. 30 Tab 2  
 fluticasone (FLONASE) 50 mcg/actuation nasal spray 2 Sprays by Both Nostrils route daily. 1 Bottle 1  
 atorvastatin (LIPITOR) 10 mg tablet Take 1 Tab by mouth daily. 30 Tab 2  
 umeclidinium-vilanterol (ANORO ELLIPTA) 62.5-25 mcg/actuation inhaler Take 1 Puff by inhalation daily.  loratadine-pseudoephedrine (CLARITIN-D 24 HOUR)  mg per tablet Take 1 Tab by mouth daily. 10 Tab 0  
 ergocalciferol (ERGOCALCIFEROL) 50,000 unit capsule Take 1 Cap by mouth every seven (7) days.  12 Cap 1  
  aspirin delayed-release 81 mg tablet 81 mg.    
 albuterol (PROVENTIL HFA, VENTOLIN HFA, PROAIR HFA) 90 mcg/actuation inhaler Take  by inhalation.  omeprazole (PRILOSEC) 20 mg capsule Take 20 mg by mouth daily.  benzonatate (TESSALON) 100 mg capsule Take 1 Cap by mouth three (3) times daily as needed for Cough. 60 Cap 0 Allergies Allergen Reactions  Latex, Natural Rubber Rash Family History Problem Relation Age of Onset  Diabetes Mother  Hypertension Mother  Cancer Father  Hypertension Father Social History Tobacco Use  Smoking status: Former Smoker Packs/day: 0.30 Years: 27.00 Pack years: 8.10 Types: Cigarettes  Smokeless tobacco: Never Used Substance Use Topics  Alcohol use: No  
  Alcohol/week: 0.0 oz Patient Active Problem List  
Diagnosis Code  Vomiting R11.10  Hemorrhoids K64.9  Back pain M54.9  GERD (gastroesophageal reflux disease) K21.9  
 H/O thyroid nodule Z86.39  
 Renal cyst, left N28.1  Tobacco abuse Z72.0  Hypovitaminosis D E55.9  Dyslipidemia E78.5 Depression Risk Factor Screening:  
 
3 most recent PHQ Screens 2/28/2019 Little interest or pleasure in doing things Not at all Feeling down, depressed, irritable, or hopeless Not at all Total Score PHQ 2 0 Alcohol Risk Factor Screening: You do not drink alcohol or very rarely. On any occasion in the past three months you have had more than 3 drinks containing alcohol. Functional Ability and Level of Safety:  
1. Was the patient's timed Up and GO test unsteady or longer than 30 seconds? Yes 2. Does the patient need help with the phone, transportation, shopping, preparing meals, housework, laundry, medications or managing money? No 
3. Does the patients' home have rugs in the hallway, lack grab bars in the bathroom, lack handrails on the stairs or have poor lighting? No 
4. Have you noticed any hearing difficulties?  No 
 Hearing Evaluation: 
 
Hearing Loss Hearing is good. Activities of Daily Living The home contains: no safety equipment. Patient does total self care Fall Risk Fall Risk Assessment, last 12 mths 2/28/2019 Able to walk? Yes Fall in past 12 months? No  
 
 
Abuse Screen Patient is not abused Cognitive Screening Evaluation of Cognitive Function: 
Has your family/caregiver stated any concerns about your memory: no 
Normal 
 
Patient Care Team  
Patient Care Team: 
Shannan Cook MD as PCP - General (Family Practice) Bro Rodriguez MD (Family Practice) Robert Francisco MD (Surgery) Royal Lenin PA-C (Physician Assistant) Pro Damon MD (Ophthalmology) Sariah Li MD (Pulmonary Disease) Avril Vargas MD (Gastroenterology) Assessment/Plan Education and counseling provided: 
End-of-Life planning (with patient's consent) Screening for glaucoma 1. Encounter for Medicare annual wellness exam 
 
2. ACP (advance care planning) 3. Encounter for immunization 
- PNEUMOCOCCAL CONJ VACCINE 13 VALENT IM 
- [place] IMMUNIZ ADMIN,1 SINGLE/COMB VAC/TOXOID Health Maintenance Due Topic Date Due  
 DTaP/Tdap/Td series (1 - Tdap) 01/27/1975  Shingrix Vaccine Age 50> (1 of 2) 01/27/2004  GLAUCOMA SCREENING Q2Y  01/27/2019  Bone Densitometry (Dexa) Screening  01/27/2019  Pneumococcal 65+ Low/Medium Risk (1 of 2 - PCV13) 01/27/2019  MEDICARE YEARLY EXAM  02/09/2019 I have discussed the diagnosis with the patient and the intended plan of care as seen in the above orders. The patient has received an after-visit summary and questions were answered concerning future plans. I have discussed medication, side effects, and warnings with the patient in detail. The patient verbalized understanding and is in agreement with the plan of care.  The patient will contact the office with any additional concerns. Personalized preventative plan of care was discussed, printed and given to patient.  
 
Philippe Gamboa MD

## 2019-02-28 NOTE — PROGRESS NOTES
Venipuncture to left forearm at this time. Patient tolerated well. No other concerns noted at this time. Also pneumo 13 injection given to left deltoid. Patient tolerated well. No reaction noted

## 2019-02-28 NOTE — PATIENT INSTRUCTIONS
Medicare Part B Preventive Services Limitations Recommendation Scheduled Bone Mass Measurement 
(age 72 & older, biennial) A bone mass density test is recommended when a woman turns 65 to screen for osteoporosis. This test is only recommended one time, as a screening. Some providers will use this same test as a disease monitoring tool if you already have osteoporosis. Due    
Cardiovascular Screening Blood Tests (every 5 years) Total cholesterol, HDL, Triglycerides and ECG Order blood work  as a panel if possible and adults with routine risk  an electrocardiogram (ECG) at intervals determined by your doctor. Due    
Colorectal Cancer Screening 
-Fecal occult blood test (annual) -Flexible sigmoidoscopy (5y) 
-Screening colonoscopy (10y) -Barium Enema Colorectal cancer screenings should be done for adults age 54-65 with no increased risk factors for colorectal cancer. There are a number of acceptable methods of screening for this type of cancer. Each test has its own benefits and drawbacks. Discuss with your doctor what is most appropriate for you during your annual wellness visit. The different tests include: colonoscopy (considered the best screening method), a fecal occult blood test, a fecal DNA test, and sigmoidoscopy. utd Counseling to Prevent Tobacco Use (up to 8 sessions per year) - Counseling greater than 3 and up to 10 minutes - Counseling greater than 10 minutes Patients must be asymptomatic of tobacco-related conditions to receive as preventive service N/a Diabetes Screening Tests (at least every 3 years, Medicare covers annually or at 6-month intervals for prediabetic patients) Fasting blood sugar (FBS) or glucose tolerance test (GTT) -All adults age 38-68 who are overweight should have a diabetes screening test once every three years.  
-Other screening tests and preventive services for persons with diabetes include: an eye exam to screen for diabetic retinopathy, a kidney function test, a foot exam, and stricter control over your cholesterol. N/a Diabetes Self-Management Training (DSMT) (no USPSTF recommendation) Requires referral by treating physician for patient with diabetes or renal disease. 10 hours of initial DSMT session of no less than 30 minutes each in a continuous 12-month period. 2 hours of follow-up DSMT in subsequent years. N/a Glaucoma Screening (no USPSTF recommendation) Diabetes mellitus, family history, , age 48 or over,  American, age 72 or over [facility] Human Immunodeficiency Virus (HIV) Screening (annually for increased risk patients) HIV-1 and HIV-2 by EIA, NATALY, rapid antibody test, or oral mucosa transudate Patient must be at increased risk for HIV infection per USPSTF guidelines or pregnant. Tests covered annually for patients at increased risk. Pregnant patients may receive up to 3 test during pregnancy. N/a Medical Nutrition Therapy (MNT) (for diabetes or renal disease not recommended schedule) Requires referral by treating physician for patient with diabetes or renal disease. Can be provided in same year as diabetes self-management training (DSMT), and CMS recommends medical nutrition therapy take place after DSMT. Up to 3 hours for initial year and 2 hours in subsequent years. N/a Shingles Vaccination A shingles vaccine is also recommended once in a lifetime after age 61 Due Seasonal Influenza Vaccination (annually) All adults should have a flu vaccine yearly  UTD Pneumococcal Vaccination (once after 72) All adults over the age of 72 should receive the recommended pneumonia vaccines. Current USPSTF guidelines recommend a series of two vaccines for the best pneumonia protection. DUE Hepatitis B Vaccinations (if medium/high risk) Medium/high risk factors:  End-stage renal disease, Hemophiliacs who received Factor VIII or IX concentrates, Clients of institutions for the mentally retarded, Persons who live in the same house as a HepB virus carrier, Homosexual men, Illicit injectable drug abusers. N/A Screening Mammography (biennial age 54-69) Breast cancer screenings are recommended every other year for women of normal risk, age 54-69. UTD Screening Pap Tests and Pelvic Examination (up to age 79 and after 79 if unknown history or abnormal study last 10 years) Cervical cancer screenings for women over age 72 are only recommended with certain risk factors N/A Hepatitis C All adults born between [facility] should be screened once  UTD Tetanus  All adults should have a tetanus vaccine every 10 years DUE

## 2019-02-28 NOTE — PROGRESS NOTES
FLORINDA Banksnory Cuenca comes in for follow-up care. Headache: Patient has a chronic headache that is intractable and frontal.  Denies changes in vision, numbness or tingling. It is a throbbing type headache. Has taken medication without much relief. Has been ongoing for more than 2 weeks. Initially was on and off but over the past 2 weeks has been intractable despite taking medication. She requests a head CT scan. I will order at this. I will follow-up with results. Hypertension: Patient has hypertension. Blood pressure is slightly elevated today. She has a headache. No changes in vision. No numbness or tingling. No focal weakness. In the past her blood pressures have been normal.  Has not needed medication. She would prefer to hold off on any medication today. She will keep a blood pressure log and I will see her at next visit. If blood pressure still elevated and she will need to take a blood pressure lowering medication. Dyslipidemia: Patient has dyslipidemia. She takes Lipitor 10 mg daily. Plan to recheck her lipid panel at next visit. We will continue with the current treatment plan. Menopause: Patient has perimenopausal symptoms with hot flashes. She is on Paxil. We will continue with the current treatment plan. Cough/COPD: Patient has a cough that has been chronic for her. She does have a history of COPD. Cough is productive of small amounts of clear phlegm. Occasionally does get wheeze. She is on inhaler medication. She would like some medication to help with the cough. I will give Tessalon Perles to use as needed. Past Medical History Past Medical History:  
Diagnosis Date  Acid reflux  Anemia  COPD (chronic obstructive pulmonary disease) (HCC)  Cystic disease of kidney  GERD (gastroesophageal reflux disease)  Goiter  Hemorrhoid  History of blood transfusion  HTN (hypertension)  Hyperlipidemia  Hypertension  Renal cyst   
 
 
 Surgical History Past Surgical History:  
Procedure Laterality Date  HX COLONOSCOPY    
 HX ENDOSCOPY    
 HX HEENT  2011 Partial thyroid  HX HYSTERECTOMY  HX ORTHOPAEDIC  2005  
 left shoulder sugery  HX PARTIAL HYSTERECTOMY  1990  
 HX THYROIDECTOMY Medications Current Outpatient Medications Medication Sig Dispense Refill  PARoxetine (PAXIL) 20 mg tablet Take 1 Tab by mouth daily. 30 Tab 2  
 benzonatate (TESSALON) 200 mg capsule Take 1 Cap by mouth three (3) times daily as needed for Cough. 30 Cap 2  
 butalbital-acetaminophen-caffeine (FIORICET, ESGIC) -40 mg per tablet Take 1 Tab by mouth every six (6) hours as needed for Pain. 30 Tab 1  
 fluticasone (FLONASE) 50 mcg/actuation nasal spray 2 Sprays by Both Nostrils route daily. 1 Bottle 1  
 umeclidinium-vilanterol (ANORO ELLIPTA) 62.5-25 mcg/actuation inhaler Take 1 Puff by inhalation daily.  ergocalciferol (ERGOCALCIFEROL) 50,000 unit capsule Take 1 Cap by mouth every seven (7) days. 12 Cap 1  
 aspirin delayed-release 81 mg tablet 81 mg.    
 albuterol (PROVENTIL HFA, VENTOLIN HFA, PROAIR HFA) 90 mcg/actuation inhaler Take  by inhalation.  omeprazole (PRILOSEC) 20 mg capsule Take 20 mg by mouth daily.  atorvastatin (LIPITOR) 10 mg tablet Take 1 Tab by mouth daily. 30 Tab 2 Allergies Allergies Allergen Reactions  Latex, Natural Rubber Rash Family History Family History Problem Relation Age of Onset  Diabetes Mother  Hypertension Mother  Cancer Father  Hypertension Father Social History Social History Socioeconomic History  Marital status: SINGLE Spouse name: Not on file  Number of children: Not on file  Years of education: Not on file  Highest education level: Not on file Social Needs  Financial resource strain: Not on file  Food insecurity - worry: Not on file  Food insecurity - inability: Not on file  Transportation needs - medical: Not on file  Transportation needs - non-medical: Not on file Occupational History  Not on file Tobacco Use  Smoking status: Former Smoker Packs/day: 0.30 Years: 27.00 Pack years: 8.10 Types: Cigarettes  Smokeless tobacco: Never Used Substance and Sexual Activity  Alcohol use: No  
  Alcohol/week: 0.0 oz  Drug use: No  
 Sexual activity: No  
Other Topics Concern  Not on file Social History Narrative ** Merged History Encounter ** Review of Systems Review of Systems -review of all systems is negative except as noted above in the HPI. Vital Signs Visit Vitals BP (!) 149/98 (BP 1 Location: Left arm, BP Patient Position: Sitting) Pulse 98 Temp 97.6 °F (36.4 °C) (Oral) Resp 18 Ht 5' 2\" (1.575 m) Wt 168 lb 3.2 oz (76.3 kg) LMP 05/08/1981 SpO2 98% BMI 30.76 kg/m² Physical Exam 
Physical Examination: General appearance - alert, well appearing, and in no distress, oriented to person, place, and time and acyanotic, in no respiratory distress Mental status - alert, oriented to person, place, and time, affect appropriate to mood Eyes - pupils equal and reactive, extraocular eye movements intact Nose - normal and patent, no erythema, discharge or polyps Mouth - mucous membranes moist, pharynx normal without lesions Neck - supple, no significant adenopathy Lymphatics - no palpable lymphadenopathy, no hepatosplenomegaly Chest - clear to auscultation, no wheezes, rales or rhonchi, symmetric air entry Heart - normal rate and regular rhythm, S1 and S2 normal 
Back exam - limited range of motion Neurological - alert, oriented, normal speech, no focal findings or movement disorder noted Musculoskeletal - full range of motion without pain Extremities - no pedal edema noted Results Results for orders placed or performed during the hospital encounter of 08/13/18 HEPATIC FUNCTION PANEL  
 Result Value Ref Range Protein, total 7.8 6.4 - 8.2 g/dL Albumin 4.1 3.4 - 5.0 g/dL Globulin 3.7 2.0 - 4.0 g/dL A-G Ratio 1.1 0.8 - 1.7 Bilirubin, total 0.4 0.2 - 1.0 MG/DL Bilirubin, direct 0.1 0.0 - 0.2 MG/DL Alk. phosphatase 90 45 - 117 U/L  
 AST (SGOT) 20 15 - 37 U/L  
 ALT (SGPT) 31 13 - 56 U/L  
TSH 3RD GENERATION Result Value Ref Range TSH 0.79 0.36 - 3.74 uIU/mL CBC WITH AUTOMATED DIFF Result Value Ref Range WBC 8.4 4.6 - 13.2 K/uL  
 RBC 4.76 4.20 - 5.30 M/uL  
 HGB 13.4 12.0 - 16.0 g/dL HCT 42.4 35.0 - 45.0 % MCV 89.1 74.0 - 97.0 FL  
 MCH 28.2 24.0 - 34.0 PG  
 MCHC 31.6 31.0 - 37.0 g/dL  
 RDW 13.8 11.6 - 14.5 % PLATELET 850 087 - 881 K/uL MPV 10.9 9.2 - 11.8 FL  
 NEUTROPHILS 68 40 - 73 % LYMPHOCYTES 23 21 - 52 % MONOCYTES 5 3 - 10 % EOSINOPHILS 3 0 - 5 % BASOPHILS 1 0 - 2 %  
 ABS. NEUTROPHILS 5.7 1.8 - 8.0 K/UL  
 ABS. LYMPHOCYTES 1.9 0.9 - 3.6 K/UL  
 ABS. MONOCYTES 0.4 0.05 - 1.2 K/UL  
 ABS. EOSINOPHILS 0.2 0.0 - 0.4 K/UL  
 ABS. BASOPHILS 0.1 0.0 - 0.1 K/UL  
 DF AUTOMATED ASSESSMENT and PLAN 
  ICD-10-CM ICD-9-CM 1. Chronic intractable headache, unspecified headache type R51 784.0 CT HEAD WO CONT  
   butalbital-acetaminophen-caffeine (FIORICET, ESGIC) -40 mg per tablet METABOLIC PANEL, COMPREHENSIVE  
   CBC WITH AUTOMATED DIFF  
   COLLECTION VENOUS BLOOD,VENIPUNCTURE 2. Menopause syndrome N95.1 627.2 PARoxetine (PAXIL) 20 mg tablet COLLECTION VENOUS BLOOD,VENIPUNCTURE 3. Cough R05 786.2 benzonatate (TESSALON) 200 mg capsule 4. Encounter for immunization Z23 V03.89 PNEUMOCOCCAL CONJ VACCINE 13 VALENT IM  
   [place] IMMUNIZ ADMIN,1 SINGLE/COMB VAC/TOXOID 5. Post-menopausal osteoporosis M81.0 733.01 DEXA BONE DENSITY STUDY AXIAL 6. Hypovitaminosis D E55.9 268.9 VITAMIN D, 25 HYDROXY  
   COLLECTION VENOUS BLOOD,VENIPUNCTURE  
7.  Dyslipidemia E78.5 272.4 LIPID PANEL  
 COLLECTION VENOUS BLOOD,VENIPUNCTURE  
 
lab results and schedule of future lab studies reviewed with patient 
reviewed diet, exercise and weight control 
cardiovascular risk and specific lipid/LDL goals reviewed 
reviewed medications and side effects in detail 
radiology results and schedule of future radiology studies reviewed with patient I have discussed the diagnosis with the patient and the intended plan of care as seen in the above orders. The patient has received an after-visit summary and questions were answered concerning future plans. I have discussed medication, side effects, and warnings with the patient in detail. The patient verbalized understanding and is in agreement with the plan of care. The patient will contact the office with any additional concerns.  
 
Malu Davis MD

## 2019-03-01 LAB — 25(OH)D3 SERPL-MCNC: 97.7 NG/ML (ref 30–100)

## 2019-03-04 NOTE — ACP (ADVANCE CARE PLANNING)
Advance Care Planning (ACP) Provider Note - Comprehensive     Date of ACP Conversation: 02/28/19  Persons included in Conversation:  patient  Length of ACP Conversation in minutes:  20 minutes    Authorized Decision Maker (if patient is incapable of making informed decisions): This person is:  Patient states that her son would be her authorize decision-maker. She will take forms given and discussed with family. She will fill this out and bring them in for scanning into her EMR chart. General ACP for ALL Patients with Decision Making Capacity:   Importance of advance care planning, including choosing a healthcare agent to communicate patient's healthcare decisions if patient lost the ability to make decisions, such as after a sudden illness or accident  Understanding of the healthcare agent role was assessed and information provided  Exploration of values, goals, and preferences if recovery is not expected, even with continued medical treatment in the event of: Imminent death  Severe, permanent brain injury  \"In these circumstances, what matters most to you? \"  Care focused more on comfort or quality of life.   Opportunity offered to explore how cultural, Christianity, spiritual, or personal beliefs would affect decisions for future care     Interventions Provided:  Recommended completion of Advance Directive form after review of ACP materials and conversation with prospective healthcare agent      Robbie Trevino MD

## 2019-03-04 NOTE — PROGRESS NOTES
Please let patient know her cholesterol level is elevated. I would recommend going up on her Lipitor to 20 mg daily. If she is willing she should let me know and I will send in a prescription for this. Rest of her lab results are stable.   Alex Degroot MD

## 2019-03-07 ENCOUNTER — TELEPHONE (OUTPATIENT)
Dept: FAMILY MEDICINE CLINIC | Age: 64
End: 2019-03-07

## 2019-03-07 NOTE — TELEPHONE ENCOUNTER
Patient called stating that the fioricet is not working with her headache and she wanted to know if  can up the dosage or what do he recommend. Informed patient provider is out of the office today and will return on 03/11/2019 and I will clarify with him on Monday.  Patient verbalized understanding

## 2019-03-11 DIAGNOSIS — R51.9 CHRONIC INTRACTABLE HEADACHE, UNSPECIFIED HEADACHE TYPE: Primary | ICD-10-CM

## 2019-03-11 DIAGNOSIS — G89.29 CHRONIC INTRACTABLE HEADACHE, UNSPECIFIED HEADACHE TYPE: Primary | ICD-10-CM

## 2019-03-11 NOTE — TELEPHONE ENCOUNTER
Informed patient at this time regarding recommendation.  Patient verbalized understanding at this time

## 2019-03-11 NOTE — TELEPHONE ENCOUNTER
If the headache does not improve she should go to the emergency room for an evaluation. May need to have a CT scan checked.   Shannon Alexander MD

## 2019-03-11 NOTE — TELEPHONE ENCOUNTER
Please let patient know I will place a referral for her to be seen by the neurologist given the persistence of a headache. She can take the Fioricet 1-2 pills up to every 8 hours as needed for the pain. Maximum is 6 pills in 24 hours.   Marium Orr MD

## 2019-03-13 NOTE — PROGRESS NOTES
Called patient at this time. Informed patient of results at this time. Patient verbalized understanding and agreed to increase Lipitor. Please send in RX at this time.

## 2019-03-26 ENCOUNTER — OFFICE VISIT (OUTPATIENT)
Dept: FAMILY MEDICINE CLINIC | Age: 64
End: 2019-03-26

## 2019-03-26 VITALS
BODY MASS INDEX: 31.47 KG/M2 | SYSTOLIC BLOOD PRESSURE: 140 MMHG | OXYGEN SATURATION: 95 % | HEIGHT: 62 IN | RESPIRATION RATE: 18 BRPM | WEIGHT: 171 LBS | HEART RATE: 99 BPM | DIASTOLIC BLOOD PRESSURE: 81 MMHG | TEMPERATURE: 95.8 F

## 2019-03-26 DIAGNOSIS — R51.9 CHRONIC INTRACTABLE HEADACHE, UNSPECIFIED HEADACHE TYPE: ICD-10-CM

## 2019-03-26 DIAGNOSIS — I10 ESSENTIAL HYPERTENSION: ICD-10-CM

## 2019-03-26 DIAGNOSIS — K92.0 HEMATEMESIS, PRESENCE OF NAUSEA NOT SPECIFIED: Primary | ICD-10-CM

## 2019-03-26 DIAGNOSIS — K21.9 GASTROESOPHAGEAL REFLUX DISEASE, ESOPHAGITIS PRESENCE NOT SPECIFIED: ICD-10-CM

## 2019-03-26 DIAGNOSIS — J32.4 PANSINUSITIS, UNSPECIFIED CHRONICITY: ICD-10-CM

## 2019-03-26 DIAGNOSIS — J44.9 CHRONIC OBSTRUCTIVE PULMONARY DISEASE, UNSPECIFIED COPD TYPE (HCC): ICD-10-CM

## 2019-03-26 DIAGNOSIS — G89.29 CHRONIC INTRACTABLE HEADACHE, UNSPECIFIED HEADACHE TYPE: ICD-10-CM

## 2019-03-26 RX ORDER — FLUTICASONE PROPIONATE 50 MCG
2 SPRAY, SUSPENSION (ML) NASAL DAILY
Qty: 1 BOTTLE | Refills: 1 | Status: SHIPPED | OUTPATIENT
Start: 2019-03-26 | End: 2019-06-13 | Stop reason: SDUPTHER

## 2019-03-26 RX ORDER — HYDROCHLOROTHIAZIDE 12.5 MG/1
12.5 TABLET ORAL DAILY
Qty: 30 TAB | Refills: 2 | Status: SHIPPED | OUTPATIENT
Start: 2019-03-26 | End: 2019-06-26 | Stop reason: SDUPTHER

## 2019-03-26 RX ORDER — BUTALBITAL, ACETAMINOPHEN AND CAFFEINE 50; 325; 40 MG/1; MG/1; MG/1
1 TABLET ORAL
Qty: 30 TAB | Refills: 1 | Status: SHIPPED | OUTPATIENT
Start: 2019-03-26 | End: 2019-05-30

## 2019-03-26 NOTE — PROGRESS NOTES
Chief Complaint Patient presents with  COPD  
  dyslipdemia  Vomiting Blood  
  and blood in stool  Results  
  dexa and ct  Weight Gain  
  bloating 1. Have you been to the ER, urgent care clinic since your last visit? Hospitalized since your last visit? No 
 
2. Have you seen or consulted any other health care providers outside of the [address] [place] [address] since your last visit? Include any pap smears or colon screening. No  
 
HPI Norva Ali comes in for f/u care. Headache: Head CT scan is normal. On fioricet. Has been seen by the neurologist. Was referred by the ENT physician. Will request the records. In the meantime we will have her continue with medication for the headache. I will send in a prescription for the Fioricet. She would consider taking medication for headache prophylaxis. Sinus congestion: has had balloon rhinoplasty. Has sinus congestion and is using flonase nasal spray. Would like a refill of medication. Prescription is sent in. She does have frontal sinus pressure that comes on and off. Occasional postnasal discharge that is clear and not bloody. Hematemesis: patient has had blood tinged phlegm and this was also noted when she vomited x2 episodes. Has heartburn which is especially noticed with some foods. This was when she also had the episodes of throwing up. She is on prilosec with relief. Been seen by GI in the past. Will refer to the GI for evaluation and management. May need to have EGD done. Seen in [facility] in the past. 
Mood d/o: currently stressed out. Has a lot of family members were unwell and she has to care for all of them. She is on paxil. This is helping. We will continue with the current treatment plan. Obesity: has gained 3 pounds. Will do lifestyle and dietary modification. COPD: On nebulizer and anoro ellipta, stable HTN: Patient's blood pressure is elevated.   Previously she was on blood pressure medication but discontinued taking these. Give HCTZ daily, f/u in 1 month for BP check. Past Medical History Past Medical History:  
Diagnosis Date  Acid reflux  Anemia  COPD (chronic obstructive pulmonary disease) (HCC)  Cystic disease of kidney  GERD (gastroesophageal reflux disease)  Goiter  Hemorrhoid  History of blood transfusion  HTN (hypertension)  Hyperlipidemia  Hypertension  Renal cyst   
 
 
Surgical History Past Surgical History:  
Procedure Laterality Date  HX COLONOSCOPY    
 HX ENDOSCOPY    
 HX HEENT  2011 Partial thyroid  HX HYSTERECTOMY  HX ORTHOPAEDIC  2005  
 left shoulder sugery  HX PARTIAL HYSTERECTOMY  1990  
 HX THYROIDECTOMY Medications Current Outpatient Medications Medication Sig Dispense Refill  PARoxetine (PAXIL) 20 mg tablet Take 1 Tab by mouth daily. 30 Tab 2  
 butalbital-acetaminophen-caffeine (FIORICET, ESGIC) -40 mg per tablet Take 1 Tab by mouth every six (6) hours as needed for Pain. 30 Tab 1  
 fluticasone (FLONASE) 50 mcg/actuation nasal spray 2 Sprays by Both Nostrils route daily. 1 Bottle 1  
 atorvastatin (LIPITOR) 10 mg tablet Take 1 Tab by mouth daily. 30 Tab 2  
 umeclidinium-vilanterol (ANORO ELLIPTA) 62.5-25 mcg/actuation inhaler Take 1 Puff by inhalation daily.  ergocalciferol (ERGOCALCIFEROL) 50,000 unit capsule Take 1 Cap by mouth every seven (7) days. 12 Cap 1  
 aspirin delayed-release 81 mg tablet 81 mg.    
 albuterol (PROVENTIL HFA, VENTOLIN HFA, PROAIR HFA) 90 mcg/actuation inhaler Take  by inhalation.  omeprazole (PRILOSEC) 20 mg capsule Take 20 mg by mouth daily.  benzonatate (TESSALON) 200 mg capsule Take 1 Cap by mouth three (3) times daily as needed for Cough. 30 Cap 2 Allergies Allergies Allergen Reactions  Latex, Natural Rubber Rash Family History Family History Problem Relation Age of Onset  Diabetes Mother  Hypertension Mother  Cancer Father  Hypertension Father Social History Social History Socioeconomic History  Marital status: SINGLE Spouse name: Not on file  Number of children: Not on file  Years of education: Not on file  Highest education level: Not on file Occupational History  Not on file Social Needs  Financial resource strain: Not on file  Food insecurity:  
  Worry: Not on file Inability: Not on file  Transportation needs:  
  Medical: Not on file Non-medical: Not on file Tobacco Use  Smoking status: Former Smoker Packs/day: 0.30 Years: 27.00 Pack years: 8.10 Types: Cigarettes  Smokeless tobacco: Never Used Substance and Sexual Activity  Alcohol use: No  
  Alcohol/week: 0.0 oz  Drug use: No  
 Sexual activity: Never Lifestyle  Physical activity:  
  Days per week: Not on file Minutes per session: Not on file  Stress: Not on file Relationships  Social connections:  
  Talks on phone: Not on file Gets together: Not on file Attends [employer] service: Not on file Active member of club or organization: Not on file Attends meetings of clubs or organizations: Not on file Relationship status: Not on file  Intimate partner violence:  
  Fear of current or ex partner: Not on file Emotionally abused: Not on file Physically abused: Not on file Forced sexual activity: Not on file Other Topics Concern  Not on file Social History Narrative ** Merged History Encounter ** Review of Systems Review of Systems -review of pulse systems is negative except as noted above in the HPI. Vital Signs Visit Vitals /81 (BP 1 Location: Left arm, BP Patient Position: Sitting) Pulse 99 Temp 95.8 °F (35.4 °C) (Oral) Resp 18 Ht 5' 2\" (1.575 m) Wt 171 lb (77.6 kg) LMP 05/08/1981 SpO2 95% BMI 31.28 kg/m² Physical Exam 
 Physical Examination: General appearance - oriented to person, place, and time, acyanotic, in no respiratory distress and playful, active Mental status - alert, oriented to person, place, and time, affect appropriate to mood Mouth - mucous membranes moist, pharynx normal without lesions Neck - supple, no significant adenopathy Lymphatics - no palpable lymphadenopathy, no hepatosplenomegaly Chest - clear to auscultation, no wheezes, rales or rhonchi, symmetric air entry Heart - normal rate and regular rhythm, S1 and S2 normal 
Abdomen - no rebound tenderness noted Back exam - limited range of motion Neurological - motor and sensory grossly normal bilaterally Musculoskeletal - no muscular tenderness noted Extremities - intact peripheral pulses Results Results for orders placed or performed during the hospital encounter of 02/28/19 METABOLIC PANEL, COMPREHENSIVE Result Value Ref Range Sodium 137 136 - 145 mmol/L Potassium 4.6 3.5 - 5.5 mmol/L Chloride 103 100 - 108 mmol/L  
 CO2 27 21 - 32 mmol/L Anion gap 7 3.0 - 18 mmol/L Glucose 77 74 - 99 mg/dL BUN 15 7.0 - 18 MG/DL Creatinine 0.84 0.6 - 1.3 MG/DL  
 BUN/Creatinine ratio 18 12 - 20 GFR est AA >60 >60 ml/min/1.73m2 GFR est non-AA >60 >60 ml/min/1.73m2 Calcium 9.3 8.5 - 10.1 MG/DL Bilirubin, total 0.3 0.2 - 1.0 MG/DL  
 ALT (SGPT) 43 13 - 56 U/L  
 AST (SGOT) 28 15 - 37 U/L Alk. phosphatase 96 45 - 117 U/L Protein, total 7.8 6.4 - 8.2 g/dL Albumin 4.5 3.4 - 5.0 g/dL Globulin 3.3 2.0 - 4.0 g/dL A-G Ratio 1.4 0.8 - 1.7 LIPID PANEL Result Value Ref Range LIPID PROFILE Cholesterol, total 198 <200 MG/DL Triglyceride 160 (H) <150 MG/DL  
 HDL Cholesterol 48 40 - 60 MG/DL  
 LDL, calculated 118 (H) 0 - 100 MG/DL VLDL, calculated 32 MG/DL  
 CHOL/HDL Ratio 4.1 0 - 5.0    
CBC WITH AUTOMATED DIFF Result Value Ref Range WBC 7.8 4.6 - 13.2 K/uL  
 RBC 4.67 4.20 - 5.30 M/uL HGB 13.2 12.0 - 16.0 g/dL HCT 42.4 35.0 - 45.0 % MCV 90.8 74.0 - 97.0 FL  
 MCH 28.3 24.0 - 34.0 PG  
 MCHC 31.1 31.0 - 37.0 g/dL  
 RDW 14.2 11.6 - 14.5 % PLATELET 430 891 - 322 K/uL MPV 10.9 9.2 - 11.8 FL  
 NEUTROPHILS 66 40 - 73 % LYMPHOCYTES 22 21 - 52 % MONOCYTES 7 3 - 10 % EOSINOPHILS 4 0 - 5 % BASOPHILS 1 0 - 2 %  
 ABS. NEUTROPHILS 5.2 1.8 - 8.0 K/UL  
 ABS. LYMPHOCYTES 1.7 0.9 - 3.6 K/UL  
 ABS. MONOCYTES 0.5 0.05 - 1.2 K/UL  
 ABS. EOSINOPHILS 0.3 0.0 - 0.4 K/UL  
 ABS. BASOPHILS 0.1 0.0 - 0.1 K/UL  
 DF AUTOMATED    
VITAMIN D, 25 HYDROXY Result Value Ref Range Vitamin D 25-Hydroxy 97.7 30 - 100 ng/mL ASSESSMENT and PLAN 
  ICD-10-CM ICD-9-CM 1. Hematemesis, presence of nausea not specified K92.0 578.0 REFERRAL TO GASTROENTEROLOGY 2. Chronic intractable headache, unspecified headache type R51 784.0 butalbital-acetaminophen-caffeine (FIORICET, ESGIC) -40 mg per tablet 3. Chronic obstructive pulmonary disease, unspecified COPD type ([facility] J44.9 496 4. Pansinusitis, unspecified chronicity J32.4 473.8 5. Gastroesophageal reflux disease, esophagitis presence not specified K21.9 530.81   
6. Essential hypertension I10 401.9 hydroCHLOROthiazide (HYDRODIURIL) 12.5 mg tablet  
 
lab results and schedule of future lab studies reviewed with patient 
reviewed diet, exercise and weight control 
cardiovascular risk and specific lipid/LDL goals reviewed 
reviewed medications and side effects in detail 
radiology results and schedule of future radiology studies reviewed with patient I have discussed the diagnosis with the patient and the intended plan of care as seen in the above orders. The patient has received an after-visit summary and questions were answered concerning future plans. I have discussed medication, side effects, and warnings with the patient in detail.  The patient verbalized understanding and is in agreement with the plan of care. The patient will contact the office with any additional concerns.  
 
Josee English MD

## 2019-03-28 DIAGNOSIS — R51.9 CHRONIC INTRACTABLE HEADACHE, UNSPECIFIED HEADACHE TYPE: ICD-10-CM

## 2019-03-28 DIAGNOSIS — G89.29 CHRONIC INTRACTABLE HEADACHE, UNSPECIFIED HEADACHE TYPE: ICD-10-CM

## 2019-03-29 DIAGNOSIS — M81.0 POST-MENOPAUSAL OSTEOPOROSIS: ICD-10-CM

## 2019-04-16 DIAGNOSIS — N95.1 MENOPAUSE SYNDROME: ICD-10-CM

## 2019-04-16 RX ORDER — PAROXETINE HYDROCHLORIDE 20 MG/1
20 TABLET, FILM COATED ORAL DAILY
Qty: 30 TAB | Refills: 2 | Status: SHIPPED | OUTPATIENT
Start: 2019-04-16 | End: 2019-04-24 | Stop reason: DRUGHIGH

## 2019-04-16 NOTE — TELEPHONE ENCOUNTER
Requested Prescriptions     Pending Prescriptions Disp Refills    PARoxetine (PAXIL) 20 mg tablet 30 Tab 2     Sig: Take 1 Tab by mouth daily.    Patient stated she would like for Dr Moe Aldrich to lower the dose to 10mg instead of 20mg

## 2019-04-24 ENCOUNTER — OFFICE VISIT (OUTPATIENT)
Dept: FAMILY MEDICINE CLINIC | Age: 64
End: 2019-04-24

## 2019-04-24 VITALS
OXYGEN SATURATION: 94 % | SYSTOLIC BLOOD PRESSURE: 144 MMHG | TEMPERATURE: 98 F | DIASTOLIC BLOOD PRESSURE: 81 MMHG | HEIGHT: 62 IN | BODY MASS INDEX: 31.36 KG/M2 | WEIGHT: 170.4 LBS | HEART RATE: 108 BPM | RESPIRATION RATE: 18 BRPM

## 2019-04-24 DIAGNOSIS — E55.9 HYPOVITAMINOSIS D: ICD-10-CM

## 2019-04-24 DIAGNOSIS — I10 ESSENTIAL HYPERTENSION: Primary | ICD-10-CM

## 2019-04-24 DIAGNOSIS — N95.1 MENOPAUSE SYNDROME: ICD-10-CM

## 2019-04-24 RX ORDER — AMLODIPINE BESYLATE 10 MG/1
10 TABLET ORAL DAILY
Qty: 30 TAB | Refills: 2 | Status: SHIPPED | OUTPATIENT
Start: 2019-04-24 | End: 2019-09-03 | Stop reason: SDUPTHER

## 2019-04-24 RX ORDER — PAROXETINE 10 MG/1
10 TABLET, FILM COATED ORAL DAILY
Qty: 30 TAB | Refills: 2 | Status: SHIPPED | OUTPATIENT
Start: 2019-04-24 | End: 2019-10-08

## 2019-04-24 RX ORDER — ERGOCALCIFEROL 1.25 MG/1
50000 CAPSULE ORAL
Qty: 13 CAP | Refills: 1 | Status: SHIPPED | OUTPATIENT
Start: 2019-04-24 | End: 2019-11-24 | Stop reason: SDUPTHER

## 2019-04-24 NOTE — PROGRESS NOTES
Chief Complaint   Patient presents with    Hypertension     1. Have you been to the ER, urgent care clinic since your last visit? Hospitalized since your last visit? No    2. Have you seen or consulted any other health care providers outside of the [address] [place] [address] since your last visit? Include any pap smears or colon screening. No     HPI  Yuval Herrera comes in for follow-up care. Patient has hypertension. Has been on HCTZ. Blood pressure still remains elevated. Systolic 799/49. She denies headache, changes in vision or focal weakness. We will start her on amlodipine 10 mg daily. She will come back in for blood pressure check in 5 weeks. Patient has history of anxiety and mood disorder. Lately she is stressed out because her uncle is in well and is in hospital.  She has been on Paxil. This helps with her mood. She also has history of menopausal symptoms and the Paxil has helped. She is currently taking 20 mg daily. She feels that this is too much for her and would like to cut back to 10 mg daily. We will make adjustment and I will follow-up at next visit. Patient has a history of hypovitaminosis D. She takes vitamin D replacement therapy. Would like a refill of medication sent in to the pharmacy.       Past Medical History  Past Medical History:   Diagnosis Date    Acid reflux     Anemia     COPD (chronic obstructive pulmonary disease) (HCC)     Cystic disease of kidney     GERD (gastroesophageal reflux disease)     Goiter     Hemorrhoid     History of blood transfusion     HTN (hypertension)     Hyperlipidemia     Hypertension     Renal cyst        Surgical History  Past Surgical History:   Procedure Laterality Date    HX COLONOSCOPY      HX ENDOSCOPY      HX HEENT  2011    Partial thyroid    HX HYSTERECTOMY      HX ORTHOPAEDIC  2005    left shoulder sugery    HX PARTIAL HYSTERECTOMY  1990    HX THYROIDECTOMY          Medications  Current Outpatient Medications Medication Sig Dispense Refill    PARoxetine (PAXIL) 10 mg tablet Take 1 Tab by mouth daily. 30 Tab 2    amLODIPine (NORVASC) 10 mg tablet Take 1 Tab by mouth daily. 30 Tab 2    ergocalciferol (ERGOCALCIFEROL) 50,000 unit capsule Take 1 Cap by mouth every seven (7) days. 13 Cap 1    fluticasone propionate (FLONASE) 50 mcg/actuation nasal spray 2 Sprays by Both Nostrils route daily. 1 Bottle 1    butalbital-acetaminophen-caffeine (FIORICET, ESGIC) -40 mg per tablet Take 1 Tab by mouth every six (6) hours as needed for Pain. 30 Tab 1    hydroCHLOROthiazide (HYDRODIURIL) 12.5 mg tablet Take 1 Tab by mouth daily. 30 Tab 2    atorvastatin (LIPITOR) 10 mg tablet Take 1 Tab by mouth daily. 30 Tab 2    umeclidinium-vilanterol (ANORO ELLIPTA) 62.5-25 mcg/actuation inhaler Take 1 Puff by inhalation daily.  aspirin delayed-release 81 mg tablet 81 mg.      albuterol (PROVENTIL HFA, VENTOLIN HFA, PROAIR HFA) 90 mcg/actuation inhaler Take  by inhalation.  omeprazole (PRILOSEC) 20 mg capsule Take 20 mg by mouth daily.  benzonatate (TESSALON) 200 mg capsule Take 1 Cap by mouth three (3) times daily as needed for Cough.  30 Cap 2       Allergies  Allergies   Allergen Reactions    Latex, Natural Rubber Rash       Family History  Family History   Problem Relation Age of Onset    Diabetes Mother     Hypertension Mother     Cancer Father     Hypertension Father        Social History  Social History     Socioeconomic History    Marital status: SINGLE     Spouse name: Not on file    Number of children: Not on file    Years of education: Not on file    Highest education level: Not on file   Occupational History    Not on file   Social Needs    Financial resource strain: Not on file    Food insecurity:     Worry: Not on file     Inability: Not on file    Transportation needs:     Medical: Not on file     Non-medical: Not on file   Tobacco Use    Smoking status: Former Smoker     Packs/day: 0.30 Years: 27.00     Pack years: 8.10     Types: Cigarettes    Smokeless tobacco: Never Used   Substance and Sexual Activity    Alcohol use: No     Alcohol/week: 0.0 oz    Drug use: No    Sexual activity: Never   Lifestyle    Physical activity:     Days per week: Not on file     Minutes per session: Not on file    Stress: Not on file   Relationships    Social connections:     Talks on phone: Not on file     Gets together: Not on file     Attends [employer] service: Not on file     Active member of club or organization: Not on file     Attends meetings of clubs or organizations: Not on file     Relationship status: Not on file    Intimate partner violence:     Fear of current or ex partner: Not on file     Emotionally abused: Not on file     Physically abused: Not on file     Forced sexual activity: Not on file   Other Topics Concern    Not on file   Social History Narrative    ** Merged History Encounter **            Review of Systems  Review of Systems - Review of all systems is negative except as noted above in the HPI.     Vital Signs  Visit Vitals  /81 (BP 1 Location: Left arm, BP Patient Position: Sitting)   Pulse (!) 108   Temp 98 °F (36.7 °C) (Oral)   Resp 18   Ht 5' 2\" (1.575 m)   Wt 170 lb 6.4 oz (77.3 kg)   LMP 05/08/1981   SpO2 94%   BMI 31.17 kg/m²         Physical Exam  Physical Examination: General appearance - alert, well appearing, and in no distress, oriented to person, place, and time, acyanotic, in no respiratory distress and well hydrated  Mental status - affect appropriate to mood  Neck - supple, no significant adenopathy  Lymphatics - no palpable lymphadenopathy  Chest - clear to auscultation, no wheezes, rales or rhonchi, symmetric air entry  Heart - normal rate and regular rhythm, S1 and S2 normal  Neurological - motor and sensory grossly normal bilaterally  Musculoskeletal - full range of motion without pain  Extremities - no pedal edema noted, intact peripheral pulses    Results  Results for orders placed or performed during the hospital encounter of 12/68/93   METABOLIC PANEL, COMPREHENSIVE   Result Value Ref Range    Sodium 137 136 - 145 mmol/L    Potassium 4.6 3.5 - 5.5 mmol/L    Chloride 103 100 - 108 mmol/L    CO2 27 21 - 32 mmol/L    Anion gap 7 3.0 - 18 mmol/L    Glucose 77 74 - 99 mg/dL    BUN 15 7.0 - 18 MG/DL    Creatinine 0.84 0.6 - 1.3 MG/DL    BUN/Creatinine ratio 18 12 - 20      GFR est AA >60 >60 ml/min/1.73m2    GFR est non-AA >60 >60 ml/min/1.73m2    Calcium 9.3 8.5 - 10.1 MG/DL    Bilirubin, total 0.3 0.2 - 1.0 MG/DL    ALT (SGPT) 43 13 - 56 U/L    AST (SGOT) 28 15 - 37 U/L    Alk. phosphatase 96 45 - 117 U/L    Protein, total 7.8 6.4 - 8.2 g/dL    Albumin 4.5 3.4 - 5.0 g/dL    Globulin 3.3 2.0 - 4.0 g/dL    A-G Ratio 1.4 0.8 - 1.7     LIPID PANEL   Result Value Ref Range    LIPID PROFILE          Cholesterol, total 198 <200 MG/DL    Triglyceride 160 (H) <150 MG/DL    HDL Cholesterol 48 40 - 60 MG/DL    LDL, calculated 118 (H) 0 - 100 MG/DL    VLDL, calculated 32 MG/DL    CHOL/HDL Ratio 4.1 0 - 5.0     CBC WITH AUTOMATED DIFF   Result Value Ref Range    WBC 7.8 4.6 - 13.2 K/uL    RBC 4.67 4.20 - 5.30 M/uL    HGB 13.2 12.0 - 16.0 g/dL    HCT 42.4 35.0 - 45.0 %    MCV 90.8 74.0 - 97.0 FL    MCH 28.3 24.0 - 34.0 PG    MCHC 31.1 31.0 - 37.0 g/dL    RDW 14.2 11.6 - 14.5 %    PLATELET 595 090 - 822 K/uL    MPV 10.9 9.2 - 11.8 FL    NEUTROPHILS 66 40 - 73 %    LYMPHOCYTES 22 21 - 52 %    MONOCYTES 7 3 - 10 %    EOSINOPHILS 4 0 - 5 %    BASOPHILS 1 0 - 2 %    ABS. NEUTROPHILS 5.2 1.8 - 8.0 K/UL    ABS. LYMPHOCYTES 1.7 0.9 - 3.6 K/UL    ABS. MONOCYTES 0.5 0.05 - 1.2 K/UL    ABS. EOSINOPHILS 0.3 0.0 - 0.4 K/UL    ABS. BASOPHILS 0.1 0.0 - 0.1 K/UL    DF AUTOMATED     VITAMIN D, 25 HYDROXY   Result Value Ref Range    Vitamin D 25-Hydroxy 97.7 30 - 100 ng/mL       ASSESSMENT and PLAN    ICD-10-CM ICD-9-CM    1. Essential hypertension I10 401.9    2.  Hypovitaminosis D E55.9 268.9 ergocalciferol (ERGOCALCIFEROL) 50,000 unit capsule   3. Menopause syndrome N95.1 627.2      reviewed diet, exercise and weight control  reviewed medications and side effects in detail    I have discussed the diagnosis with the patient and the intended plan of care as seen in the above orders. The patient has received an after-visit summary and questions were answered concerning future plans. I have discussed medication, side effects, and warnings with the patient in detail. The patient verbalized understanding and is in agreement with the plan of care. The patient will contact the office with any additional concerns.     Donnell Israel MD

## 2019-05-06 ENCOUNTER — TELEPHONE (OUTPATIENT)
Dept: FAMILY MEDICINE CLINIC | Age: 64
End: 2019-05-06

## 2019-05-06 NOTE — TELEPHONE ENCOUNTER
Patient called, losing her voice, she states she was seen 4/24/19 for sinus infection and has another one. She wants to know if something can be called into [facility]. She states she doesn't want to end up in the hospital on oxygen.

## 2019-05-06 NOTE — TELEPHONE ENCOUNTER
Patient called back to request the little pills for coughing too but she did not know the name of the medicine

## 2019-05-07 ENCOUNTER — TELEPHONE (OUTPATIENT)
Dept: FAMILY MEDICINE CLINIC | Age: 64
End: 2019-05-07

## 2019-05-07 DIAGNOSIS — R05.9 COUGH: ICD-10-CM

## 2019-05-07 RX ORDER — AMOXICILLIN 500 MG/1
500 CAPSULE ORAL 3 TIMES DAILY
Qty: 30 CAP | Refills: 0 | Status: SHIPPED | OUTPATIENT
Start: 2019-05-07 | End: 2019-05-17

## 2019-05-07 RX ORDER — BENZONATATE 200 MG/1
200 CAPSULE ORAL
Qty: 30 CAP | Refills: 2 | Status: SHIPPED | OUTPATIENT
Start: 2019-05-07 | End: 2019-05-30

## 2019-05-07 NOTE — TELEPHONE ENCOUNTER
Received call from pt stating she needed an antibiotic for the hoariness that she is having. Doesn't want to loose her voice. Please call when it has been sent to the pharmacy. [phone].

## 2019-05-13 DIAGNOSIS — E78.5 DYSLIPIDEMIA: ICD-10-CM

## 2019-05-13 NOTE — TELEPHONE ENCOUNTER
Requested Prescriptions     Pending Prescriptions Disp Refills    atorvastatin (LIPITOR) 10 mg tablet 30 Tab 2     Sig: Take 1 Tab by mouth daily.      Patient is out of medication

## 2019-05-14 RX ORDER — ATORVASTATIN CALCIUM 10 MG/1
10 TABLET, FILM COATED ORAL DAILY
Qty: 30 TAB | Refills: 0 | Status: SHIPPED | OUTPATIENT
Start: 2019-05-14 | End: 2019-07-15 | Stop reason: SDUPTHER

## 2019-05-30 ENCOUNTER — HOSPITAL ENCOUNTER (OUTPATIENT)
Dept: LAB | Age: 64
Discharge: HOME | End: 2019-05-30
Payer: MEDICARE

## 2019-05-30 ENCOUNTER — TELEPHONE (OUTPATIENT)
Dept: FAMILY MEDICINE CLINIC | Age: 64
End: 2019-05-30

## 2019-05-30 ENCOUNTER — OFFICE VISIT (OUTPATIENT)
Dept: FAMILY MEDICINE CLINIC | Age: 64
End: 2019-05-30

## 2019-05-30 VITALS
SYSTOLIC BLOOD PRESSURE: 136 MMHG | TEMPERATURE: 96 F | RESPIRATION RATE: 18 BRPM | BODY MASS INDEX: 30.73 KG/M2 | OXYGEN SATURATION: 92 % | HEIGHT: 62 IN | DIASTOLIC BLOOD PRESSURE: 76 MMHG | WEIGHT: 167 LBS | HEART RATE: 104 BPM

## 2019-05-30 DIAGNOSIS — R00.0 TACHYCARDIA: ICD-10-CM

## 2019-05-30 DIAGNOSIS — I10 ESSENTIAL HYPERTENSION: ICD-10-CM

## 2019-05-30 DIAGNOSIS — R00.2 PALPITATIONS: Primary | ICD-10-CM

## 2019-05-30 DIAGNOSIS — R00.2 PALPITATIONS: ICD-10-CM

## 2019-05-30 DIAGNOSIS — K21.9 GASTROESOPHAGEAL REFLUX DISEASE, ESOPHAGITIS PRESENCE NOT SPECIFIED: ICD-10-CM

## 2019-05-30 DIAGNOSIS — Z01.89 ENCOUNTER FOR LABORATORY EXAMINATION: ICD-10-CM

## 2019-05-30 LAB
ALBUMIN SERPL-MCNC: 4.7 G/DL (ref 3.4–5)
ALBUMIN/GLOB SERPL: 1.3 {RATIO} (ref 0.8–1.7)
ALP SERPL-CCNC: 89 U/L (ref 45–117)
ALT SERPL-CCNC: 41 U/L (ref 13–56)
ANION GAP SERPL CALC-SCNC: 10 MMOL/L (ref 3–18)
AST SERPL-CCNC: 25 U/L (ref 15–37)
BASOPHILS # BLD: 0.1 K/UL (ref 0–0.1)
BASOPHILS NFR BLD: 2 % (ref 0–2)
BILIRUB SERPL-MCNC: 0.5 MG/DL (ref 0.2–1)
BUN SERPL-MCNC: 14 MG/DL (ref 7–18)
BUN/CREAT SERPL: 16 (ref 12–20)
CALCIUM SERPL-MCNC: 10 MG/DL (ref 8.5–10.1)
CHLORIDE SERPL-SCNC: 99 MMOL/L (ref 100–108)
CO2 SERPL-SCNC: 28 MMOL/L (ref 21–32)
CREAT SERPL-MCNC: 0.86 MG/DL (ref 0.6–1.3)
DIFFERENTIAL METHOD BLD: ABNORMAL
EOSINOPHIL # BLD: 0.3 K/UL (ref 0–0.4)
EOSINOPHIL NFR BLD: 4 % (ref 0–5)
ERYTHROCYTE [DISTWIDTH] IN BLOOD BY AUTOMATED COUNT: 14.7 % (ref 11.6–14.5)
GLOBULIN SER CALC-MCNC: 3.6 G/DL (ref 2–4)
GLUCOSE SERPL-MCNC: 97 MG/DL (ref 74–99)
HCT VFR BLD AUTO: 46.2 % (ref 35–45)
HGB BLD-MCNC: 14.3 G/DL (ref 12–16)
LYMPHOCYTES # BLD: 2.2 K/UL (ref 0.9–3.6)
LYMPHOCYTES NFR BLD: 25 % (ref 21–52)
MAGNESIUM SERPL-MCNC: 2.2 MG/DL (ref 1.6–2.6)
MCH RBC QN AUTO: 28.8 PG (ref 24–34)
MCHC RBC AUTO-ENTMCNC: 31 G/DL (ref 31–37)
MCV RBC AUTO: 93 FL (ref 74–97)
MONOCYTES # BLD: 0.6 K/UL (ref 0.05–1.2)
MONOCYTES NFR BLD: 7 % (ref 3–10)
NEUTS SEG # BLD: 5.7 K/UL (ref 1.8–8)
NEUTS SEG NFR BLD: 62 % (ref 40–73)
PLATELET # BLD AUTO: 345 K/UL (ref 135–420)
PMV BLD AUTO: 10.5 FL (ref 9.2–11.8)
POTASSIUM SERPL-SCNC: 4.5 MMOL/L (ref 3.5–5.5)
PROT SERPL-MCNC: 8.3 G/DL (ref 6.4–8.2)
RBC # BLD AUTO: 4.97 M/UL (ref 4.2–5.3)
SODIUM SERPL-SCNC: 137 MMOL/L (ref 136–145)
TSH SERPL DL<=0.05 MIU/L-ACNC: 1.31 UIU/ML (ref 0.36–3.74)
WBC # BLD AUTO: 8.9 K/UL (ref 4.6–13.2)

## 2019-05-30 PROCEDURE — 85025 COMPLETE CBC W/AUTO DIFF WBC: CPT

## 2019-05-30 PROCEDURE — 84443 ASSAY THYROID STIM HORMONE: CPT

## 2019-05-30 PROCEDURE — 83735 ASSAY OF MAGNESIUM: CPT

## 2019-05-30 PROCEDURE — 80053 COMPREHEN METABOLIC PANEL: CPT

## 2019-05-30 RX ORDER — METOPROLOL SUCCINATE 25 MG/1
25 TABLET, EXTENDED RELEASE ORAL DAILY
Qty: 30 TAB | Refills: 2 | Status: SHIPPED | OUTPATIENT
Start: 2019-05-30 | End: 2019-09-01 | Stop reason: SDUPTHER

## 2019-05-30 NOTE — TELEPHONE ENCOUNTER
Patient called to speak to the nurse regarding her medication, wanting advice.  Please contact when available

## 2019-05-30 NOTE — PROGRESS NOTES
Chief Complaint   Patient presents with    Rapid Heart Rate     1. Have you been to the ER, urgent care clinic since your last visit? Hospitalized since your last visit? No    2. Have you seen or consulted any other health care providers outside of the [address] [place] [address] since your last visit? Include any pap smears or colon screening. No     HPI  Yuval Herrera comes in for f/u care. Patient has palpitations. These have been ongoing for more than a month. She does get this fluttering sensation in her chest.  Denies syncope but does have occasional dizziness and lightheadedness. Denies chest pain or diaphoresis. She has an occasional cough that is productive of clear phlegm. No wheeze or hemoptysis. Denies shortness of breath or pedal edema. She did try to get him to see a cardiologist but was unable. She comes in for evaluation. We will do an EKG. This was done and showed sinus tachycardia. States that he has been keeping up with her fluids. I will check labs. We will also refer her to the cardiologist.  She would like to see Dr. Verenice Li. I will start her on metoprolol succinate daily. Patient has hypertension. Blood pressure is 136/76. She is on HCTZ and amlodipine. Patient has a history of dyslipidemia. She is on Lipitor. Tolerating the medication. Plan to recheck labs at next visit. She has postmenopausal symptoms. She is on Paxil that helps with the symptoms. Patient has heartburn with epigastric pain and reflux symptoms. She is being seen by the gastroenterologist and has a follow-up appointment. Will need to have an EGD done. Denies dark stools or hematemesis. She is on omeprazole. She will continue with this medication.     Past Medical History  Past Medical History:   Diagnosis Date    Acid reflux     Anemia     COPD (chronic obstructive pulmonary disease) (HCC)     Cystic disease of kidney     GERD (gastroesophageal reflux disease)     Goiter     Hemorrhoid     History of blood transfusion     HTN (hypertension)     Hyperlipidemia     Hypertension     Renal cyst        Surgical History  Past Surgical History:   Procedure Laterality Date    HX COLONOSCOPY      HX ENDOSCOPY      HX HEENT  2011    Partial thyroid    HX HYSTERECTOMY      HX ORTHOPAEDIC  2005    left shoulder sugery    HX PARTIAL HYSTERECTOMY  1990    HX THYROIDECTOMY          Medications  Current Outpatient Medications   Medication Sig Dispense Refill    metoprolol succinate (TOPROL-XL) 25 mg XL tablet Take 1 Tab by mouth daily. 30 Tab 2    atorvastatin (LIPITOR) 10 mg tablet Take 1 Tab by mouth daily. 30 Tab 0    PARoxetine (PAXIL) 10 mg tablet Take 1 Tab by mouth daily. 30 Tab 2    amLODIPine (NORVASC) 10 mg tablet Take 1 Tab by mouth daily. 30 Tab 2    ergocalciferol (ERGOCALCIFEROL) 50,000 unit capsule Take 1 Cap by mouth every seven (7) days. 13 Cap 1    fluticasone propionate (FLONASE) 50 mcg/actuation nasal spray 2 Sprays by Both Nostrils route daily. 1 Bottle 1    hydroCHLOROthiazide (HYDRODIURIL) 12.5 mg tablet Take 1 Tab by mouth daily. 30 Tab 2    umeclidinium-vilanterol (ANORO ELLIPTA) 62.5-25 mcg/actuation inhaler Take 1 Puff by inhalation daily.  aspirin delayed-release 81 mg tablet 81 mg.      albuterol (PROVENTIL HFA, VENTOLIN HFA, PROAIR HFA) 90 mcg/actuation inhaler Take  by inhalation.  omeprazole (PRILOSEC) 20 mg capsule Take 20 mg by mouth daily.          Allergies  Allergies   Allergen Reactions    Latex, Natural Rubber Rash       Family History  Family History   Problem Relation Age of Onset    Diabetes Mother     Hypertension Mother     Cancer Father     Hypertension Father        Social History  Social History     Socioeconomic History    Marital status: SINGLE     Spouse name: Not on file    Number of children: Not on file    Years of education: Not on file    Highest education level: Not on file   Occupational History    Not on file   Social Needs    Financial resource strain: Not on file    Food insecurity:     Worry: Not on file     Inability: Not on file    Transportation needs:     Medical: Not on file     Non-medical: Not on file   Tobacco Use    Smoking status: Former Smoker     Packs/day: 0.30     Years: 27.00     Pack years: 8.10     Types: Cigarettes    Smokeless tobacco: Never Used   Substance and Sexual Activity    Alcohol use: No     Alcohol/week: 0.0 oz    Drug use: No    Sexual activity: Never   Lifestyle    Physical activity:     Days per week: Not on file     Minutes per session: Not on file    Stress: Not on file   Relationships    Social connections:     Talks on phone: Not on file     Gets together: Not on file     Attends [employer] service: Not on file     Active member of club or organization: Not on file     Attends meetings of clubs or organizations: Not on file     Relationship status: Not on file    Intimate partner violence:     Fear of current or ex partner: Not on file     Emotionally abused: Not on file     Physically abused: Not on file     Forced sexual activity: Not on file   Other Topics Concern    Not on file   Social History Narrative    ** Merged History Encounter **            Review of Systems  Review of Systems - Review of all systems is negative except as noted above in the HPI.     Vital Signs  Visit Vitals  /76 (BP 1 Location: Left arm, BP Patient Position: Sitting)   Pulse (!) 104   Temp 96 °F (35.6 °C) (Oral)   Resp 18   Ht 5' 2\" (1.575 m)   Wt 167 lb (75.8 kg)   LMP 05/08/1981   SpO2 92%   BMI 30.54 kg/m²         Physical Exam  Physical Examination: General appearance - alert, well appearing, and in no distress, oriented to person, place, and time, acyanotic, in no respiratory distress and anxious  Mental status - anxious  Mouth - mucous membranes moist, pharynx normal without lesions  Neck - supple, no significant adenopathy  Lymphatics - no palpable lymphadenopathy, no hepatosplenomegaly  Chest - clear to auscultation, no wheezes, rales or rhonchi, symmetric air entry  Heart - no murmurs noted, tachycardia  Abdomen - soft, nontender, nondistended, no masses or organomegaly  Back exam - full range of motion, no tenderness, palpable spasm or pain on motion  Neurological - alert, oriented, normal speech, no focal findings or movement disorder noted  Musculoskeletal - full range of motion without pain  Extremities - no pedal edema noted, intact peripheral pulses    Results  Results for orders placed or performed during the hospital encounter of 13/12/15   METABOLIC PANEL, COMPREHENSIVE   Result Value Ref Range    Sodium 137 136 - 145 mmol/L    Potassium 4.6 3.5 - 5.5 mmol/L    Chloride 103 100 - 108 mmol/L    CO2 27 21 - 32 mmol/L    Anion gap 7 3.0 - 18 mmol/L    Glucose 77 74 - 99 mg/dL    BUN 15 7.0 - 18 MG/DL    Creatinine 0.84 0.6 - 1.3 MG/DL    BUN/Creatinine ratio 18 12 - 20      GFR est AA >60 >60 ml/min/1.73m2    GFR est non-AA >60 >60 ml/min/1.73m2    Calcium 9.3 8.5 - 10.1 MG/DL    Bilirubin, total 0.3 0.2 - 1.0 MG/DL    ALT (SGPT) 43 13 - 56 U/L    AST (SGOT) 28 15 - 37 U/L    Alk.  phosphatase 96 45 - 117 U/L    Protein, total 7.8 6.4 - 8.2 g/dL    Albumin 4.5 3.4 - 5.0 g/dL    Globulin 3.3 2.0 - 4.0 g/dL    A-G Ratio 1.4 0.8 - 1.7     LIPID PANEL   Result Value Ref Range    LIPID PROFILE          Cholesterol, total 198 <200 MG/DL    Triglyceride 160 (H) <150 MG/DL    HDL Cholesterol 48 40 - 60 MG/DL    LDL, calculated 118 (H) 0 - 100 MG/DL    VLDL, calculated 32 MG/DL    CHOL/HDL Ratio 4.1 0 - 5.0     CBC WITH AUTOMATED DIFF   Result Value Ref Range    WBC 7.8 4.6 - 13.2 K/uL    RBC 4.67 4.20 - 5.30 M/uL    HGB 13.2 12.0 - 16.0 g/dL    HCT 42.4 35.0 - 45.0 %    MCV 90.8 74.0 - 97.0 FL    MCH 28.3 24.0 - 34.0 PG    MCHC 31.1 31.0 - 37.0 g/dL    RDW 14.2 11.6 - 14.5 %    PLATELET 098 778 - 745 K/uL    MPV 10.9 9.2 - 11.8 FL    NEUTROPHILS 66 40 - 73 %    LYMPHOCYTES 22 21 - 52 %    MONOCYTES 7 3 - 10 %    EOSINOPHILS 4 0 - 5 %    BASOPHILS 1 0 - 2 %    ABS. NEUTROPHILS 5.2 1.8 - 8.0 K/UL    ABS. LYMPHOCYTES 1.7 0.9 - 3.6 K/UL    ABS. MONOCYTES 0.5 0.05 - 1.2 K/UL    ABS. EOSINOPHILS 0.3 0.0 - 0.4 K/UL    ABS. BASOPHILS 0.1 0.0 - 0.1 K/UL    DF AUTOMATED     VITAMIN D, 25 HYDROXY   Result Value Ref Range    Vitamin D 25-Hydroxy 97.7 30 - 100 ng/mL       ASSESSMENT and PLAN    ICD-10-CM ICD-9-CM    1. Palpitations R00.2 785.1 AMB POC EKG ROUTINE W/ 12 LEADS, INTER & REP      TSH 3RD GENERATION      CBC WITH AUTOMATED DIFF      METABOLIC PANEL, COMPREHENSIVE      MAGNESIUM      REFERRAL TO CARDIOLOGY      metoprolol succinate (TOPROL-XL) 25 mg XL tablet   2. Tachycardia R00.0 785.0 AMB POC EKG ROUTINE W/ 12 LEADS, INTER & REP      TSH 3RD GENERATION      CBC WITH AUTOMATED DIFF      METABOLIC PANEL, COMPREHENSIVE      MAGNESIUM      REFERRAL TO CARDIOLOGY      metoprolol succinate (TOPROL-XL) 25 mg XL tablet     lab results and schedule of future lab studies reviewed with patient  reviewed diet, exercise and weight control  reviewed medications and side effects in detail      I have discussed the diagnosis with the patient and the intended plan of care as seen in the above orders. The patient has received an after-visit summary and questions were answered concerning future plans. I have discussed medication, side effects, and warnings with the patient in detail. The patient verbalized understanding and is in agreement with the plan of care. The patient will contact the office with any additional concerns. Elva Reynolds MD    PLEASE NOTE:   This document has been produced using voice recognition software.  Unrecognized errors in transcription may be present

## 2019-06-13 NOTE — PROGRESS NOTES
Spoke with patient at this time. Patient verbalized understanding at this time.  No other concerns noted at this time

## 2019-06-15 RX ORDER — FLUTICASONE PROPIONATE 50 MCG
SPRAY, SUSPENSION (ML) NASAL
Qty: 1 BOTTLE | Refills: 1 | Status: SHIPPED | OUTPATIENT
Start: 2019-06-15 | End: 2019-09-04 | Stop reason: SDUPTHER

## 2019-06-23 DIAGNOSIS — R05.9 COUGH: ICD-10-CM

## 2019-06-23 RX ORDER — BENZONATATE 200 MG/1
CAPSULE ORAL
Qty: 30 CAP | Refills: 2 | Status: SHIPPED | OUTPATIENT
Start: 2019-06-23 | End: 2019-12-09 | Stop reason: SDUPTHER

## 2019-06-26 DIAGNOSIS — I10 ESSENTIAL HYPERTENSION: ICD-10-CM

## 2019-06-26 RX ORDER — HYDROCHLOROTHIAZIDE 12.5 MG/1
TABLET ORAL
Qty: 30 TAB | Refills: 2 | Status: SHIPPED | OUTPATIENT
Start: 2019-06-26 | End: 2019-08-21

## 2019-07-15 DIAGNOSIS — E78.5 DYSLIPIDEMIA: ICD-10-CM

## 2019-07-15 RX ORDER — ATORVASTATIN CALCIUM 10 MG/1
10 TABLET, FILM COATED ORAL DAILY
Qty: 30 TAB | Refills: 2 | Status: SHIPPED | OUTPATIENT
Start: 2019-07-15 | End: 2019-09-19 | Stop reason: SDUPTHER

## 2019-07-15 NOTE — TELEPHONE ENCOUNTER
Requested Prescriptions     Pending Prescriptions Disp Refills    atorvastatin (LIPITOR) 10 mg tablet 30 Tab 0     Sig: Take 1 Tab by mouth daily.      Patient is completely out of medication

## 2019-07-24 ENCOUNTER — OFFICE VISIT (OUTPATIENT)
Dept: FAMILY MEDICINE CLINIC | Age: 64
End: 2019-07-24

## 2019-07-24 VITALS
OXYGEN SATURATION: 96 % | TEMPERATURE: 96.2 F | SYSTOLIC BLOOD PRESSURE: 133 MMHG | WEIGHT: 169 LBS | HEIGHT: 62 IN | BODY MASS INDEX: 31.1 KG/M2 | HEART RATE: 88 BPM | RESPIRATION RATE: 16 BRPM | DIASTOLIC BLOOD PRESSURE: 71 MMHG

## 2019-07-24 DIAGNOSIS — J06.9 UPPER RESPIRATORY TRACT INFECTION, UNSPECIFIED TYPE: Primary | ICD-10-CM

## 2019-07-24 RX ORDER — AMOXICILLIN AND CLAVULANATE POTASSIUM 500; 125 MG/1; MG/1
1 TABLET, FILM COATED ORAL 2 TIMES DAILY
Qty: 20 TAB | Refills: 0 | Status: SHIPPED | OUTPATIENT
Start: 2019-07-24 | End: 2019-08-03

## 2019-07-24 NOTE — PROGRESS NOTES
Chief Complaint   Patient presents with    Headache     concern     Nasal Congestion     chest congestion and throat irritation      1. Have you been to the ER, urgent care clinic since your last visit? Hospitalized since your last visit? No    2. Have you seen or consulted any other health care providers outside of the [address] [place] [address] since your last visit? Include any pap smears or colon screening.  No

## 2019-07-24 NOTE — PROGRESS NOTES
OFFICE NOTE    Dharmesh Gordillo is a 71 y.o. female presenting today for office visit. 7/24/2019  7:52 AM      Chief Complaint   Patient presents with    Headache     concern     Nasal Congestion     chest congestion and throat irritation          HPI: Here today for cold symptoms. PCP Angy Zimmer MD. She states that since Sunday, she has been having some nasal congestion, chest congestion, scratchy throat, and some headaches. She states that everytime this happens, she seems to get PNA. She has a history of COPD. She takes Claritin daily and Anoro. She has been using Albuterol, Tessalon Perles, and Flonase since having symptoms. She has not noticed much improvement. No fevers or chills. Review of Systems   Constitutional: Negative for chills, fatigue and fever. HENT: Positive for congestion. Negative for ear pain, facial swelling, sinus pressure, sinus pain, sneezing and sore throat. Eyes: Negative for pain, discharge, itching and visual disturbance. Respiratory: Positive for cough. Negative for shortness of breath and wheezing. Cardiovascular: Negative for chest pain. Gastrointestinal: Negative for abdominal pain, diarrhea, nausea and vomiting. Musculoskeletal: Negative for arthralgias and myalgias. Skin: Negative for rash. Neurological: Positive for headaches.          PHQ Screening   3 most recent PHQ Screens 2/28/2019   Little interest or pleasure in doing things Not at all   Feeling down, depressed, irritable, or hopeless Not at all   Total Score PHQ 2 0         History  Past Medical History:   Diagnosis Date    Acid reflux     Anemia     COPD (chronic obstructive pulmonary disease) (HCC)     Cystic disease of kidney     GERD (gastroesophageal reflux disease)     Goiter     Hemorrhoid     History of blood transfusion     HTN (hypertension)     Hyperlipidemia     Hypertension     Renal cyst        Past Surgical History:   Procedure Laterality Date    HX COLONOSCOPY      HX ENDOSCOPY      HX HEENT  2011    Partial thyroid    HX HYSTERECTOMY      HX ORTHOPAEDIC  2005    left shoulder sugery    HX PARTIAL HYSTERECTOMY  1990    HX THYROIDECTOMY         Social History     Socioeconomic History    Marital status: SINGLE     Spouse name: Not on file    Number of children: Not on file    Years of education: Not on file    Highest education level: Not on file   Occupational History    Not on file   Social Needs    Financial resource strain: Not on file    Food insecurity:     Worry: Not on file     Inability: Not on file    Transportation needs:     Medical: Not on file     Non-medical: Not on file   Tobacco Use    Smoking status: Former Smoker     Packs/day: 0.30     Years: 27.00     Pack years: 8.10     Types: Cigarettes    Smokeless tobacco: Never Used   Substance and Sexual Activity    Alcohol use: No     Alcohol/week: 0.0 standard drinks    Drug use: No    Sexual activity: Never   Lifestyle    Physical activity:     Days per week: Not on file     Minutes per session: Not on file    Stress: Not on file   Relationships    Social connections:     Talks on phone: Not on file     Gets together: Not on file     Attends Denominational service: Not on file     Active member of club or organization: Not on file     Attends meetings of clubs or organizations: Not on file     Relationship status: Not on file    Intimate partner violence:     Fear of current or ex partner: Not on file     Emotionally abused: Not on file     Physically abused: Not on file     Forced sexual activity: Not on file   Other Topics Concern    Not on file   Social History Narrative    ** Merged History Encounter **            Allergies   Allergen Reactions    Latex, Natural Rubber Rash       Current Outpatient Medications   Medication Sig Dispense Refill    atorvastatin (LIPITOR) 10 mg tablet Take 1 Tab by mouth daily.  30 Tab 2    hydroCHLOROthiazide (HYDRODIURIL) 12.5 mg tablet TAKE 1 TABLET BY MOUTH ONCE DAILY 30 Tab 2    benzonatate (TESSALON) 200 mg capsule TAKE 1 CAPSULE BY MOUTH THREE TIMES DAILY AS NEEDED FOR COUGH 30 Cap 2    fluticasone propionate (FLONASE) 50 mcg/actuation nasal spray USE 2 SPRAY(S) IN EACH NOSTRIL ONCE DAILY 1 Bottle 1    metoprolol succinate (TOPROL-XL) 25 mg XL tablet Take 1 Tab by mouth daily. 30 Tab 2    PARoxetine (PAXIL) 10 mg tablet Take 1 Tab by mouth daily. 30 Tab 2    amLODIPine (NORVASC) 10 mg tablet Take 1 Tab by mouth daily. 30 Tab 2    ergocalciferol (ERGOCALCIFEROL) 50,000 unit capsule Take 1 Cap by mouth every seven (7) days. 13 Cap 1    umeclidinium-vilanterol (ANORO ELLIPTA) 62.5-25 mcg/actuation inhaler Take 1 Puff by inhalation daily.  aspirin delayed-release 81 mg tablet 81 mg.      albuterol (PROVENTIL HFA, VENTOLIN HFA, PROAIR HFA) 90 mcg/actuation inhaler Take  by inhalation.  omeprazole (PRILOSEC) 20 mg capsule Take 20 mg by mouth daily. Patient Care Team:  Patient Care Team:  Liliana Rocha MD as PCP - General (Family Practice)  Lorena Holliday MD (Family Practice)  Maris Ayala MD (Surgery)  Adin Riggs PA-C (Physician Assistant)  Jared Dominguez MD (Ophthalmology)  Heather Smith MD (Pulmonary Disease)  Mojgan Guerin MD (Gastroenterology)        LABS:  None new to review    RADIOLOGY:  None new to review      Physical Exam   Constitutional: She is oriented to person, place, and time. She appears well-developed and well-nourished. No distress. HENT:   Right Ear: Tympanic membrane, external ear and ear canal normal.   Left Ear: Tympanic membrane, external ear and ear canal normal.   Nose: Mucosal edema and rhinorrhea present. Right sinus exhibits no maxillary sinus tenderness and no frontal sinus tenderness. Left sinus exhibits no maxillary sinus tenderness and no frontal sinus tenderness.    Mouth/Throat: Uvula is midline, oropharynx is clear and moist and mucous membranes are normal. No oropharyngeal exudate or posterior oropharyngeal erythema. Eyes: Pupils are equal, round, and reactive to light. EOM are normal. Right eye exhibits no discharge. Left eye exhibits no discharge. Neck: Normal range of motion. Neck supple. Cardiovascular: Normal rate, regular rhythm and normal heart sounds. No murmur heard. Pulmonary/Chest: Effort normal and breath sounds normal. No respiratory distress. Abdominal: Soft. Bowel sounds are normal. There is no tenderness. Lymphadenopathy:     She has no cervical adenopathy. Neurological: She is alert and oriented to person, place, and time. Skin: Skin is warm and dry. No rash noted. Vitals:    07/24/19 0736   BP: 133/71   Pulse: 88   Resp: 16   Temp: 96.2 °F (35.7 °C)   TempSrc: Oral   SpO2: 96%   Weight: 169 lb (76.7 kg)   Height: 5' 2\" (1.575 m)   PainSc:   0 - No pain   LMP: 05/08/1981         Assessment and Plan    Upper respiratory tract infection, unspecified type  *Appears to be viral in nature- attempted education to patient regarding symptom management and monitoring; however, she insists that an AB is necessary or she will end up in the hospital with PNA. She dismissed every symptom management recommendation that I made. Attempted education on AB stewardship- not effective. Gave AB script and advised to take if worsening.   - amoxicillin-clavulanate (AUGMENTIN) 500-125 mg per tablet; Take 1 Tab by mouth two (2) times a day for 10 days. Dispense: 20 Tab; Refill: 0        *Plan of care reviewed with patient. Patient in agreement with plan and expresses understanding. All questions answered and patient encouraged to call or RTO if further questions or concerns. Follow-up and Dispositions    · Return if symptoms worsen or fail to improve.

## 2019-08-21 ENCOUNTER — OFFICE VISIT (OUTPATIENT)
Dept: FAMILY MEDICINE CLINIC | Age: 64
End: 2019-08-21

## 2019-08-21 VITALS
HEART RATE: 101 BPM | RESPIRATION RATE: 20 BRPM | BODY MASS INDEX: 31.47 KG/M2 | DIASTOLIC BLOOD PRESSURE: 80 MMHG | WEIGHT: 171 LBS | HEIGHT: 62 IN | TEMPERATURE: 97.5 F | OXYGEN SATURATION: 95 % | SYSTOLIC BLOOD PRESSURE: 117 MMHG

## 2019-08-21 DIAGNOSIS — I10 ESSENTIAL HYPERTENSION: ICD-10-CM

## 2019-08-21 DIAGNOSIS — R00.2 PALPITATIONS: Primary | ICD-10-CM

## 2019-08-21 NOTE — PROGRESS NOTES
HPI  Shameka French comes in for f/u care. Palpitations: Patient has been having palpitations on and off. States that she only gets palpitations when she takes HCTZ. She would like to discontinue this medication. Has not taken the medication for the past 3 days and feels improved. Denies chest pain, syncope or diaphoresis. She has been seen by the cardiologist in the past.  Declines to have EKG done today. States that the she would just like to discontinue the HCTZ. Previous labs done have been stable. HTN: Patient has hypertension. Blood pressure is stable. She is on metoprolol and amlodipine. She denies headache, changes in vision or focal weakness. Will continue with the current treatment plan. Past Medical History  Past Medical History:   Diagnosis Date    Acid reflux     Anemia     COPD (chronic obstructive pulmonary disease) (HCC)     Cystic disease of kidney     GERD (gastroesophageal reflux disease)     Goiter     Hemorrhoid     History of blood transfusion     HTN (hypertension)     Hyperlipidemia     Hypertension     Renal cyst        Surgical History  Past Surgical History:   Procedure Laterality Date    HX COLONOSCOPY      HX ENDOSCOPY      HX HEENT  2011    Partial thyroid    HX HYSTERECTOMY      HX ORTHOPAEDIC  2005    left shoulder sugery    HX PARTIAL HYSTERECTOMY  1990    HX THYROIDECTOMY          Medications  Current Outpatient Medications   Medication Sig Dispense Refill    atorvastatin (LIPITOR) 10 mg tablet Take 1 Tab by mouth daily. 30 Tab 2    benzonatate (TESSALON) 200 mg capsule TAKE 1 CAPSULE BY MOUTH THREE TIMES DAILY AS NEEDED FOR COUGH 30 Cap 2    fluticasone propionate (FLONASE) 50 mcg/actuation nasal spray USE 2 SPRAY(S) IN EACH NOSTRIL ONCE DAILY 1 Bottle 1    metoprolol succinate (TOPROL-XL) 25 mg XL tablet Take 1 Tab by mouth daily. 30 Tab 2    PARoxetine (PAXIL) 10 mg tablet Take 1 Tab by mouth daily.  30 Tab 2    amLODIPine (NORVASC) 10 mg tablet Take 1 Tab by mouth daily. 30 Tab 2    ergocalciferol (ERGOCALCIFEROL) 50,000 unit capsule Take 1 Cap by mouth every seven (7) days. 13 Cap 1    umeclidinium-vilanterol (ANORO ELLIPTA) 62.5-25 mcg/actuation inhaler Take 1 Puff by inhalation daily.  aspirin delayed-release 81 mg tablet 81 mg.      albuterol (PROVENTIL HFA, VENTOLIN HFA, PROAIR HFA) 90 mcg/actuation inhaler Take  by inhalation.  omeprazole (PRILOSEC) 20 mg capsule Take 20 mg by mouth daily.          Allergies  Allergies   Allergen Reactions    Latex, Natural Rubber Rash       Family History  Family History   Problem Relation Age of Onset    Diabetes Mother     Hypertension Mother     Cancer Father     Hypertension Father        Social History  Social History     Socioeconomic History    Marital status: SINGLE     Spouse name: Not on file    Number of children: Not on file    Years of education: Not on file    Highest education level: Not on file   Occupational History    Not on file   Social Needs    Financial resource strain: Not on file    Food insecurity:     Worry: Not on file     Inability: Not on file    Transportation needs:     Medical: Not on file     Non-medical: Not on file   Tobacco Use    Smoking status: Former Smoker     Packs/day: 0.30     Years: 27.00     Pack years: 8.10     Types: Cigarettes    Smokeless tobacco: Never Used   Substance and Sexual Activity    Alcohol use: No     Alcohol/week: 0.0 standard drinks    Drug use: No    Sexual activity: Never   Lifestyle    Physical activity:     Days per week: Not on file     Minutes per session: Not on file    Stress: Not on file   Relationships    Social connections:     Talks on phone: Not on file     Gets together: Not on file     Attends [employer] service: Not on file     Active member of club or organization: Not on file     Attends meetings of clubs or organizations: Not on file     Relationship status: Not on file    Intimate partner violence:     Fear of current or ex partner: Not on file     Emotionally abused: Not on file     Physically abused: Not on file     Forced sexual activity: Not on file   Other Topics Concern    Not on file   Social History Narrative    ** Merged History Encounter **            Review of Systems  Review of Systems - Review of all systems is negative except as noted above in the HPI. Vital Signs  Visit Vitals  /80 (BP 1 Location: Left arm, BP Patient Position: Sitting)   Pulse (!) 101   Temp 97.5 °F (36.4 °C) (Oral)   Resp 20   Ht 5' 2\" (1.575 m)   Wt 171 lb (77.6 kg)   LMP 05/08/1981   SpO2 95%   BMI 31.28 kg/m²         Physical Exam  Physical Examination: General appearance - alert, well appearing, and in no distress, oriented to person, place, and time and acyanotic, in no respiratory distress  Mental status - alert, oriented to person, place, and time, anxious  Chest - clear to auscultation, no wheezes, rales or rhonchi, symmetric air entry  Heart - S1 and S2 normal  Physical Examination: Neurological - alert, oriented, normal speech, no focal findings or movement disorder noted  Extremities - no pedal edema noted, intact peripheral pulses          Results  Results for orders placed or performed during the hospital encounter of 05/30/19   TSH 3RD GENERATION   Result Value Ref Range    TSH 1.31 0.36 - 3.74 uIU/mL   CBC WITH AUTOMATED DIFF   Result Value Ref Range    WBC 8.9 4.6 - 13.2 K/uL    RBC 4.97 4.20 - 5.30 M/uL    HGB 14.3 12.0 - 16.0 g/dL    HCT 46.2 (H) 35.0 - 45.0 %    MCV 93.0 74.0 - 97.0 FL    MCH 28.8 24.0 - 34.0 PG    MCHC 31.0 31.0 - 37.0 g/dL    RDW 14.7 (H) 11.6 - 14.5 %    PLATELET 739 616 - 892 K/uL    MPV 10.5 9.2 - 11.8 FL    NEUTROPHILS 62 40 - 73 %    LYMPHOCYTES 25 21 - 52 %    MONOCYTES 7 3 - 10 %    EOSINOPHILS 4 0 - 5 %    BASOPHILS 2 0 - 2 %    ABS. NEUTROPHILS 5.7 1.8 - 8.0 K/UL    ABS. LYMPHOCYTES 2.2 0.9 - 3.6 K/UL    ABS. MONOCYTES 0.6 0.05 - 1.2 K/UL    ABS.  EOSINOPHILS 0.3 0.0 - 0.4 K/UL    ABS. BASOPHILS 0.1 0.0 - 0.1 K/UL    DF AUTOMATED     METABOLIC PANEL, COMPREHENSIVE   Result Value Ref Range    Sodium 137 136 - 145 mmol/L    Potassium 4.5 3.5 - 5.5 mmol/L    Chloride 99 (L) 100 - 108 mmol/L    CO2 28 21 - 32 mmol/L    Anion gap 10 3.0 - 18 mmol/L    Glucose 97 74 - 99 mg/dL    BUN 14 7.0 - 18 MG/DL    Creatinine 0.86 0.6 - 1.3 MG/DL    BUN/Creatinine ratio 16 12 - 20      GFR est AA >60 >60 ml/min/1.73m2    GFR est non-AA >60 >60 ml/min/1.73m2    Calcium 10.0 8.5 - 10.1 MG/DL    Bilirubin, total 0.5 0.2 - 1.0 MG/DL    ALT (SGPT) 41 13 - 56 U/L    AST (SGOT) 25 15 - 37 U/L    Alk. phosphatase 89 45 - 117 U/L    Protein, total 8.3 (H) 6.4 - 8.2 g/dL    Albumin 4.7 3.4 - 5.0 g/dL    Globulin 3.6 2.0 - 4.0 g/dL    A-G Ratio 1.3 0.8 - 1.7     MAGNESIUM   Result Value Ref Range    Magnesium 2.2 1.6 - 2.6 mg/dL       ASSESSMENT and PLAN    ICD-10-CM ICD-9-CM    1. Palpitations R00.2 785.1    2. Essential hypertension I10 401.9      lab results and schedule of future lab studies reviewed with patient  reviewed diet, exercise and weight control  reviewed medications and side effects in detail      I have discussed the diagnosis with the patient and the intended plan of care as seen in the above orders. The patient has received an after-visit summary and questions were answered concerning future plans. I have discussed medication, side effects, and warnings with the patient in detail. The patient verbalized understanding and is in agreement with the plan of care. The patient will contact the office with any additional concerns. Jennifer Cuellar MD    PLEASE NOTE:   This document has been produced using voice recognition software.  Unrecognized errors in transcription may be present

## 2019-09-01 DIAGNOSIS — R00.2 PALPITATIONS: ICD-10-CM

## 2019-09-01 DIAGNOSIS — R00.0 TACHYCARDIA: ICD-10-CM

## 2019-09-02 RX ORDER — METOPROLOL SUCCINATE 25 MG/1
TABLET, EXTENDED RELEASE ORAL
Qty: 30 TAB | Refills: 2 | Status: SHIPPED | OUTPATIENT
Start: 2019-09-02 | End: 2019-11-19

## 2019-09-03 ENCOUNTER — CLINICAL SUPPORT (OUTPATIENT)
Dept: FAMILY MEDICINE CLINIC | Age: 64
End: 2019-09-03

## 2019-09-03 ENCOUNTER — TELEPHONE (OUTPATIENT)
Dept: FAMILY MEDICINE CLINIC | Age: 64
End: 2019-09-03

## 2019-09-03 VITALS
HEIGHT: 62 IN | RESPIRATION RATE: 16 BRPM | WEIGHT: 171 LBS | BODY MASS INDEX: 31.47 KG/M2 | SYSTOLIC BLOOD PRESSURE: 147 MMHG | OXYGEN SATURATION: 93 % | HEART RATE: 94 BPM | TEMPERATURE: 95.4 F | DIASTOLIC BLOOD PRESSURE: 76 MMHG

## 2019-09-03 DIAGNOSIS — Z01.30 BLOOD PRESSURE CHECK: Primary | ICD-10-CM

## 2019-09-03 NOTE — PROGRESS NOTES
Patient in today for blood pressure check at this time. Patient was confused about which medication she suppose to be taking. Clarification given to patient at this time. Patient need a refill on amlodipine at this time.

## 2019-09-03 NOTE — TELEPHONE ENCOUNTER
Patient walked into the office and wanted her Blood pressure taken but did not want to wait in the lobby stated she doesn't have the energy to wait, she left and want to know if Dr. Bari Cifuentes will send a prescription in for bp. Stated she is feeling bad. 152/101 is her blood pressure. Advised pt that the nurse will contact when she is available. Ms Waldron Both asked when will that be, I explained that I'm not sure but they will call when they are available. She seems agitated and impatient and is requesting to be contacted as soon as possible because she doesn't feel well.  Please be advised

## 2019-09-04 DIAGNOSIS — I10 ESSENTIAL HYPERTENSION: Primary | ICD-10-CM

## 2019-09-04 RX ORDER — AMLODIPINE BESYLATE 10 MG/1
10 TABLET ORAL DAILY
Qty: 30 TAB | Refills: 2 | Status: SHIPPED | OUTPATIENT
Start: 2019-09-04 | End: 2020-02-18

## 2019-09-04 RX ORDER — FLUTICASONE PROPIONATE 50 MCG
SPRAY, SUSPENSION (ML) NASAL
Qty: 1 BOTTLE | Refills: 1 | Status: SHIPPED | OUTPATIENT
Start: 2019-09-04 | End: 2019-09-18 | Stop reason: SDUPTHER

## 2019-09-04 RX ORDER — HYDROCHLOROTHIAZIDE 12.5 MG/1
12.5 TABLET ORAL DAILY
Qty: 30 TAB | Refills: 2 | Status: SHIPPED | OUTPATIENT
Start: 2019-09-04 | End: 2019-12-03 | Stop reason: SDUPTHER

## 2019-09-04 NOTE — TELEPHONE ENCOUNTER
Please let patient know I have sent in amlodipine and HCTZ to take once a day. We will recheck her blood pressure in 2 to 3 weeks.   David Farrell MD

## 2019-09-05 ENCOUNTER — TELEPHONE (OUTPATIENT)
Dept: FAMILY MEDICINE CLINIC | Age: 64
End: 2019-09-05

## 2019-09-05 NOTE — TELEPHONE ENCOUNTER
Patient stated she called [facility] and was told they haven't received any prescriptions from Dr Cristal Houston about a bp cuff. Ms Tracy Jiang said, Dr Cristal Houston said he would send her a bp cuff prescription to the pharmacy.  Please be advised and contact when available

## 2019-09-09 NOTE — TELEPHONE ENCOUNTER
Called patient at this time. Patient verbalized understanding. Patient states she brought her a wrist Bp machine

## 2019-09-09 NOTE — TELEPHONE ENCOUNTER
Prescription is written. Patient needs to come and  the prescription. We cannot send this electronically.   Ginny Chu MD

## 2019-09-17 ENCOUNTER — TELEPHONE (OUTPATIENT)
Dept: FAMILY MEDICINE CLINIC | Age: 64
End: 2019-09-17

## 2019-09-18 ENCOUNTER — OFFICE VISIT (OUTPATIENT)
Dept: FAMILY MEDICINE CLINIC | Age: 64
End: 2019-09-18

## 2019-09-18 VITALS
WEIGHT: 171.6 LBS | BODY MASS INDEX: 31.58 KG/M2 | DIASTOLIC BLOOD PRESSURE: 71 MMHG | OXYGEN SATURATION: 93 % | HEIGHT: 62 IN | TEMPERATURE: 97.1 F | HEART RATE: 97 BPM | RESPIRATION RATE: 16 BRPM | SYSTOLIC BLOOD PRESSURE: 116 MMHG

## 2019-09-18 DIAGNOSIS — R42 DIZZINESS: ICD-10-CM

## 2019-09-18 DIAGNOSIS — I10 ESSENTIAL HYPERTENSION: Primary | ICD-10-CM

## 2019-09-18 DIAGNOSIS — Z23 ENCOUNTER FOR IMMUNIZATION: ICD-10-CM

## 2019-09-18 DIAGNOSIS — E66.9 OBESITY (BMI 30-39.9): ICD-10-CM

## 2019-09-18 RX ORDER — FLUTICASONE PROPIONATE 50 MCG
SPRAY, SUSPENSION (ML) NASAL
Qty: 1 BOTTLE | Refills: 1 | Status: SHIPPED | OUTPATIENT
Start: 2019-09-18 | End: 2020-05-04

## 2019-09-18 NOTE — PROGRESS NOTES
Chief Complaint   Patient presents with    Dizziness     1. Have you been to the ER, urgent care clinic since your last visit? Hospitalized since your last visit? No    2. Have you seen or consulted any other health care providers outside of the [address] [place] [address] [place] since your last visit? Include any pap smears or colon screening. No     HPI  Darin Moses comes in for f/u care. Patient would like to discuss some medication. She has been on blood pressure medication. She has episodes of dizziness on and off. Dizziness comes when she gets up to walk or she is moving too fast.  No palpitations or chest tightness or syncope. She denies dark stools. She also has frontal and maxillary sinus congestion. She was seen by an ENT physician and had a tilt table study done for vertigo. This she states was ruled out. She wondered about her blood pressure medication and whether her blood pressure was too low. Her blood pressure is stable. She denies fever or chills. No cough, wheeze or shortness of breath. No headache. Currently she feels stable. Patient has been followed up by cardiologist and has had cardiac catheterization done in the past that have been stable. She denies chest pain, chest tightness or diaphoresis. She does have a history of anxiety. Currently given her blood pressure is stable patient would like to follow-up with the ENT physician. She would like to take her blood pressure medications. She will do supportive care measures which help with the dizziness including standing up slowly. She will come back in in case of worsening symptoms. She does not want to have labs done today. She will get the flu vaccine. Patient has a BMI of 31.39. She will do lifestyle and dietary modification.     Past Medical History  Past Medical History:   Diagnosis Date    Acid reflux     Anemia     COPD (chronic obstructive pulmonary disease) (HCC)     Cystic disease of kidney     GERD (gastroesophageal reflux disease)     Goiter     Hemorrhoid     History of blood transfusion     HTN (hypertension)     Hyperlipidemia     Hypertension     Renal cyst        Surgical History  Past Surgical History:   Procedure Laterality Date    HX COLONOSCOPY      HX ENDOSCOPY      HX HEENT  2011    Partial thyroid    HX HYSTERECTOMY      HX ORTHOPAEDIC  2005    left shoulder sugery    HX PARTIAL HYSTERECTOMY  1990    HX THYROIDECTOMY          Medications  Current Outpatient Medications   Medication Sig Dispense Refill    fluticasone propionate (FLONASE) 50 mcg/actuation nasal spray USE 2 SPRAY(S) IN EACH NOSTRIL ONCE DAILY 1 Bottle 1    hydroCHLOROthiazide (HYDRODIURIL) 12.5 mg tablet Take 1 Tab by mouth daily. 30 Tab 2    metoprolol succinate (TOPROL-XL) 25 mg XL tablet TAKE 1 TABLET BY MOUTH ONCE DAILY 30 Tab 2    atorvastatin (LIPITOR) 10 mg tablet Take 1 Tab by mouth daily. 30 Tab 2    PARoxetine (PAXIL) 10 mg tablet Take 1 Tab by mouth daily. 30 Tab 2    umeclidinium-vilanterol (ANORO ELLIPTA) 62.5-25 mcg/actuation inhaler Take 1 Puff by inhalation daily.  aspirin delayed-release 81 mg tablet 81 mg.      albuterol (PROVENTIL HFA, VENTOLIN HFA, PROAIR HFA) 90 mcg/actuation inhaler Take  by inhalation.  omeprazole (PRILOSEC) 20 mg capsule Take 20 mg by mouth daily.  amLODIPine (NORVASC) 10 mg tablet Take 1 Tab by mouth daily. 30 Tab 2    benzonatate (TESSALON) 200 mg capsule TAKE 1 CAPSULE BY MOUTH THREE TIMES DAILY AS NEEDED FOR COUGH 30 Cap 2    ergocalciferol (ERGOCALCIFEROL) 50,000 unit capsule Take 1 Cap by mouth every seven (7) days.  13 Cap 1       Allergies  Allergies   Allergen Reactions    Latex, Natural Rubber Rash       Family History  Family History   Problem Relation Age of Onset    Diabetes Mother     Hypertension Mother     Cancer Father     Hypertension Father        Social History  Social History     Socioeconomic History    Marital status: SINGLE Spouse name: Not on file    Number of children: Not on file    Years of education: Not on file    Highest education level: Not on file   Occupational History    Not on file   Social Needs    Financial resource strain: Not on file    Food insecurity:     Worry: Not on file     Inability: Not on file    Transportation needs:     Medical: Not on file     Non-medical: Not on file   Tobacco Use    Smoking status: Former Smoker     Packs/day: 0.30     Years: 27.00     Pack years: 8.10     Types: Cigarettes    Smokeless tobacco: Never Used   Substance and Sexual Activity    Alcohol use: No     Alcohol/week: 0.0 standard drinks    Drug use: No    Sexual activity: Never   Lifestyle    Physical activity:     Days per week: Not on file     Minutes per session: Not on file    Stress: Not on file   Relationships    Social connections:     Talks on phone: Not on file     Gets together: Not on file     Attends [employer] service: Not on file     Active member of club or organization: Not on file     Attends meetings of clubs or organizations: Not on file     Relationship status: Not on file    Intimate partner violence:     Fear of current or ex partner: Not on file     Emotionally abused: Not on file     Physically abused: Not on file     Forced sexual activity: Not on file   Other Topics Concern    Not on file   Social History Narrative    ** Merged History Encounter **            Review of Systems  Review of Systems - Review of all systems is negative except as noted above in the HPI.     Vital Signs  Visit Vitals  /71 (BP 1 Location: Left arm, BP Patient Position: Sitting)   Pulse 97   Temp 97.1 °F (36.2 °C) (Oral)   Resp 16   Ht 5' 2\" (1.575 m)   Wt 171 lb 9.6 oz (77.8 kg)   LMP 05/08/1981   SpO2 93%   BMI 31.39 kg/m²         Physical Exam  Physical Examination: General appearance - alert, well appearing, and in no distress, oriented to person, place, and time and acyanotic, in no respiratory distress  Mental status - affect appropriate to mood  Mouth - mucous membranes moist, pharynx normal without lesions  Neck - supple, no significant adenopathy  Lymphatics - no palpable lymphadenopathy  Chest - clear to auscultation, no wheezes, rales or rhonchi, symmetric air entry  Heart - normal rate and regular rhythm, S1 and S2 normal  Abdomen - soft, nontender, nondistended, no masses or organomegaly  Neurological - alert, oriented, normal speech, no focal findings or movement disorder noted  Musculoskeletal - full range of motion without pain  Extremities - no pedal edema noted, intact peripheral pulses    Results  Results for orders placed or performed during the hospital encounter of 05/30/19   TSH 3RD GENERATION   Result Value Ref Range    TSH 1.31 0.36 - 3.74 uIU/mL   CBC WITH AUTOMATED DIFF   Result Value Ref Range    WBC 8.9 4.6 - 13.2 K/uL    RBC 4.97 4.20 - 5.30 M/uL    HGB 14.3 12.0 - 16.0 g/dL    HCT 46.2 (H) 35.0 - 45.0 %    MCV 93.0 74.0 - 97.0 FL    MCH 28.8 24.0 - 34.0 PG    MCHC 31.0 31.0 - 37.0 g/dL    RDW 14.7 (H) 11.6 - 14.5 %    PLATELET 116 739 - 414 K/uL    MPV 10.5 9.2 - 11.8 FL    NEUTROPHILS 62 40 - 73 %    LYMPHOCYTES 25 21 - 52 %    MONOCYTES 7 3 - 10 %    EOSINOPHILS 4 0 - 5 %    BASOPHILS 2 0 - 2 %    ABS. NEUTROPHILS 5.7 1.8 - 8.0 K/UL    ABS. LYMPHOCYTES 2.2 0.9 - 3.6 K/UL    ABS. MONOCYTES 0.6 0.05 - 1.2 K/UL    ABS. EOSINOPHILS 0.3 0.0 - 0.4 K/UL    ABS.  BASOPHILS 0.1 0.0 - 0.1 K/UL    DF AUTOMATED     METABOLIC PANEL, COMPREHENSIVE   Result Value Ref Range    Sodium 137 136 - 145 mmol/L    Potassium 4.5 3.5 - 5.5 mmol/L    Chloride 99 (L) 100 - 108 mmol/L    CO2 28 21 - 32 mmol/L    Anion gap 10 3.0 - 18 mmol/L    Glucose 97 74 - 99 mg/dL    BUN 14 7.0 - 18 MG/DL    Creatinine 0.86 0.6 - 1.3 MG/DL    BUN/Creatinine ratio 16 12 - 20      GFR est AA >60 >60 ml/min/1.73m2    GFR est non-AA >60 >60 ml/min/1.73m2    Calcium 10.0 8.5 - 10.1 MG/DL    Bilirubin, total 0.5 0.2 - 1.0 MG/DL    ALT (SGPT) 41 13 - 56 U/L    AST (SGOT) 25 15 - 37 U/L    Alk. phosphatase 89 45 - 117 U/L    Protein, total 8.3 (H) 6.4 - 8.2 g/dL    Albumin 4.7 3.4 - 5.0 g/dL    Globulin 3.6 2.0 - 4.0 g/dL    A-G Ratio 1.3 0.8 - 1.7     MAGNESIUM   Result Value Ref Range    Magnesium 2.2 1.6 - 2.6 mg/dL       ASSESSMENT and PLAN    ICD-10-CM ICD-9-CM    1. Essential hypertension I10 401.9    2. Dizziness R42 780.4    3. Encounter for immunization Z23 V03.89 INFLUENZA VACCINE INACTIVATED (IIV), SUBUNIT, ADJUVANTED, IM      ADMIN INFLUENZA VIRUS VAC   4. Obesity (BMI 30-39. 9) E66.9 278.00    Discussed the patient's BMI with her. The BMI follow up plan is as follows:     dietary management education, guidance, and counseling  encourage exercise  monitor weight  lab results and schedule of future lab studies reviewed with patient  reviewed diet, exercise and weight control  reviewed medications and side effects in detail      I have discussed the diagnosis with the patient and the intended plan of care as seen in the above orders. The patient has received an after-visit summary and questions were answered concerning future plans. I have discussed medication, side effects, and warnings with the patient in detail. The patient verbalized understanding and is in agreement with the plan of care. The patient will contact the office with any additional concerns. Herb Spivey MD    PLEASE NOTE:   This document has been produced using voice recognition software.  Unrecognized errors in transcription may be present

## 2019-09-18 NOTE — PATIENT INSTRUCTIONS
Body Mass Index: Care Instructions Your Care Instructions Body mass index (BMI) can help you see if your weight is raising your risk for health problems. It uses a formula to compare how much you weigh with how tall you are. · A BMI lower than 18.5 is considered underweight. · A BMI between 18.5 and 24.9 is considered healthy. · A BMI between 25 and 29.9 is considered overweight. A BMI of 30 or higher is considered obese. If your BMI is in the normal range, it means that you have a lower risk for weight-related health problems. If your BMI is in the overweight or obese range, you may be at increased risk for weight-related health problems, such as high blood pressure, heart disease, stroke, arthritis or joint pain, and diabetes. If your BMI is in the underweight range, you may be at increased risk for health problems such as fatigue, lower protection (immunity) against illness, muscle loss, bone loss, hair loss, and hormone problems. BMI is just one measure of your risk for weight-related health problems. You may be at higher risk for health problems if you are not active, you eat an unhealthy diet, or you drink too much alcohol or use tobacco products. Follow-up care is a key part of your treatment and safety. Be sure to make and go to all appointments, and call your doctor if you are having problems. It's also a good idea to know your test results and keep a list of the medicines you take. How can you care for yourself at home? · Practice healthy eating habits. This includes eating plenty of fruits, vegetables, whole grains, lean protein, and low-fat dairy. · If your doctor recommends it, get more exercise. Walking is a good choice. Bit by bit, increase the amount you walk every day. Try for at least 30 minutes on most days of the week. · Do not smoke. Smoking can increase your risk for health problems.  If you need help quitting, talk to your doctor about stop-smoking programs and medicines. These can increase your chances of quitting for good. · Limit alcohol to 2 drinks a day for men and 1 drink a day for women. Too much alcohol can cause health problems. If you have a BMI higher than 25 · Your doctor may do other tests to check your risk for weight-related health problems. This may include measuring the distance around your waist. A waist measurement of more than 40 inches in men or 35 inches in women can increase the risk of weight-related health problems. · Talk with your doctor about steps you can take to stay healthy or improve your health. You may need to make lifestyle changes to lose weight and stay healthy, such as changing your diet and getting regular exercise. If you have a BMI lower than 18.5 · Your doctor may do other tests to check your risk for health problems. · Talk with your doctor about steps you can take to stay healthy or improve your health. You may need to make lifestyle changes to gain or maintain weight and stay healthy, such as getting more healthy foods in your diet and doing exercises to build muscle. Where can you learn more? Go to http://mireya-lisa.info/. Enter S176 in the search box to learn more about \"Body Mass Index: Care Instructions. \" Current as of: October 13, 2016 Content Version: 11.4 © 8362-6422 [employer]. Care instructions adapted under license by [employer] (which disclaims liability or warranty for this information). If you have questions about a medical condition or this instruction, always ask your healthcare professional. [address] any warranty or liability for your use of this information.

## 2019-09-19 DIAGNOSIS — E78.5 DYSLIPIDEMIA: ICD-10-CM

## 2019-09-19 RX ORDER — ATORVASTATIN CALCIUM 10 MG/1
10 TABLET, FILM COATED ORAL DAILY
Qty: 30 TAB | Refills: 2 | Status: SHIPPED | OUTPATIENT
Start: 2019-09-19 | End: 2020-02-05

## 2019-09-19 NOTE — TELEPHONE ENCOUNTER
Requested Prescriptions     Pending Prescriptions Disp Refills    atorvastatin (LIPITOR) 10 mg tablet 30 Tab 2     Sig: Take 1 Tab by mouth daily.

## 2019-10-07 ENCOUNTER — TELEPHONE (OUTPATIENT)
Dept: FAMILY MEDICINE CLINIC | Age: 64
End: 2019-10-07

## 2019-10-07 NOTE — TELEPHONE ENCOUNTER
Patient came into the office requesting to speak to the nurse. She states the metoprolol is blocking her albuterol. She is requesting a call back from the nurse.

## 2019-10-08 ENCOUNTER — OFFICE VISIT (OUTPATIENT)
Dept: FAMILY MEDICINE CLINIC | Age: 64
End: 2019-10-08

## 2019-10-08 VITALS
HEART RATE: 99 BPM | SYSTOLIC BLOOD PRESSURE: 136 MMHG | BODY MASS INDEX: 31.39 KG/M2 | OXYGEN SATURATION: 94 % | TEMPERATURE: 95.6 F | RESPIRATION RATE: 16 BRPM | DIASTOLIC BLOOD PRESSURE: 78 MMHG | HEIGHT: 62 IN

## 2019-10-08 DIAGNOSIS — I10 ESSENTIAL HYPERTENSION: ICD-10-CM

## 2019-10-08 DIAGNOSIS — J44.9 CHRONIC OBSTRUCTIVE PULMONARY DISEASE, UNSPECIFIED COPD TYPE (HCC): ICD-10-CM

## 2019-10-08 DIAGNOSIS — F41.9 ANXIETY: ICD-10-CM

## 2019-10-08 DIAGNOSIS — R42 DIZZINESS: Primary | ICD-10-CM

## 2019-10-08 DIAGNOSIS — E78.5 DYSLIPIDEMIA: ICD-10-CM

## 2019-10-08 RX ORDER — HYDROXYZINE 25 MG/1
25 TABLET, FILM COATED ORAL
Qty: 30 TAB | Refills: 1 | Status: SHIPPED | OUTPATIENT
Start: 2019-10-08 | End: 2019-11-09 | Stop reason: SDUPTHER

## 2019-10-08 NOTE — PROGRESS NOTES
Chief Complaint   Patient presents with    Medication Evaluation    Dizziness     1. Have you been to the ER, urgent care clinic since your last visit? Hospitalized since your last visit? No    2. Have you seen or consulted any other health care providers outside of the [address] [place] [address] [facility] since your last visit? Include any pap smears or colon screening. No     HPI  Monica Can comes in for f/u care. Dizziness: patient has episodes of dizziness and lightheadedness. This comes with walking and change of position. Denies syncope or chest pain. She does have occasional palpitations. Has been seen by the cardiologist in the past and cardiac work-up has been stable. She does get episodes of vertigo. She is scheduled to see the ENT tomorrow and have some tests done. I will follow-up after that. Previous lab tests have been reassuring. COPD: patient has a h/o COPD and is seen by the pulmonologist.  She uses albuterol inhaler as needed. She has been having episodes of wheeze on and off and shortness of breath. No cough or hemoptysis. No fever or chills. Was advised to discontinue the beta blocker. This was given due to tachycardia. I will discontinue this. Will f/u as needed. Mood disorder: patient has anxiety and depression. She is on has been on paxil. Discussed using different medication. Will send in hydroxyzine to take as needed. Occasionally the anxiety does cause her to feel sensation of palpitations and shortness of breath. She would want to continue with the Paxil daily. Dyslipidemia: Patient is on Lipitor. Stable on this medication. Continue current treatment plan. Hypertension: Patient's blood pressure is stable. She has been on metoprolol, amlodipine and HCTZ. Will discontinue the metoprolol. She will continue taking HCTZ and amlodipine. I will follow-up at next visit.     Past Medical History  Past Medical History:   Diagnosis Date    Acid reflux     Anemia     COPD (chronic obstructive pulmonary disease) (HCC)     Cystic disease of kidney     GERD (gastroesophageal reflux disease)     Goiter     Hemorrhoid     History of blood transfusion     HTN (hypertension)     Hyperlipidemia     Hypertension     Renal cyst        Surgical History  Past Surgical History:   Procedure Laterality Date    HX COLONOSCOPY      HX ENDOSCOPY      HX HEENT  2011    Partial thyroid    HX HYSTERECTOMY      HX ORTHOPAEDIC  2005    left shoulder sugery    HX PARTIAL HYSTERECTOMY  1990    HX THYROIDECTOMY          Medications  Current Outpatient Medications   Medication Sig Dispense Refill    atorvastatin (LIPITOR) 10 mg tablet Take 1 Tab by mouth daily. 30 Tab 2    fluticasone propionate (FLONASE) 50 mcg/actuation nasal spray USE 2 SPRAY(S) IN EACH NOSTRIL ONCE DAILY 1 Bottle 1    amLODIPine (NORVASC) 10 mg tablet Take 1 Tab by mouth daily. 30 Tab 2    hydroCHLOROthiazide (HYDRODIURIL) 12.5 mg tablet Take 1 Tab by mouth daily. 30 Tab 2    metoprolol succinate (TOPROL-XL) 25 mg XL tablet TAKE 1 TABLET BY MOUTH ONCE DAILY 30 Tab 2    benzonatate (TESSALON) 200 mg capsule TAKE 1 CAPSULE BY MOUTH THREE TIMES DAILY AS NEEDED FOR COUGH 30 Cap 2    PARoxetine (PAXIL) 10 mg tablet Take 1 Tab by mouth daily. 30 Tab 2    ergocalciferol (ERGOCALCIFEROL) 50,000 unit capsule Take 1 Cap by mouth every seven (7) days. 13 Cap 1    umeclidinium-vilanterol (ANORO ELLIPTA) 62.5-25 mcg/actuation inhaler Take 1 Puff by inhalation daily.  aspirin delayed-release 81 mg tablet 81 mg.      albuterol (PROVENTIL HFA, VENTOLIN HFA, PROAIR HFA) 90 mcg/actuation inhaler Take  by inhalation.  omeprazole (PRILOSEC) 20 mg capsule Take 20 mg by mouth daily.          Allergies  Allergies   Allergen Reactions    Latex, Natural Rubber Rash       Family History  Family History   Problem Relation Age of Onset    Diabetes Mother     Hypertension Mother     Cancer Father     Hypertension Father Social History  Social History     Socioeconomic History    Marital status: SINGLE     Spouse name: Not on file    Number of children: Not on file    Years of education: Not on file    Highest education level: Not on file   Occupational History    Not on file   Social Needs    Financial resource strain: Not on file    Food insecurity:     Worry: Not on file     Inability: Not on file    Transportation needs:     Medical: Not on file     Non-medical: Not on file   Tobacco Use    Smoking status: Former Smoker     Packs/day: 0.30     Years: 27.00     Pack years: 8.10     Types: Cigarettes    Smokeless tobacco: Never Used   Substance and Sexual Activity    Alcohol use: No     Alcohol/week: 0.0 standard drinks    Drug use: No    Sexual activity: Never   Lifestyle    Physical activity:     Days per week: Not on file     Minutes per session: Not on file    Stress: Not on file   Relationships    Social connections:     Talks on phone: Not on file     Gets together: Not on file     Attends Yazidi service: Not on file     Active member of club or organization: Not on file     Attends meetings of clubs or organizations: Not on file     Relationship status: Not on file    Intimate partner violence:     Fear of current or ex partner: Not on file     Emotionally abused: Not on file     Physically abused: Not on file     Forced sexual activity: Not on file   Other Topics Concern    Not on file   Social History Narrative    ** Merged History Encounter **            Review of Systems  Review of Systems - Review of all systems is negative except as noted above in the HPI.     Vital Signs  Visit Vitals  /78 (BP 1 Location: Left arm, BP Patient Position: Sitting)   Pulse 99   Temp 95.6 °F (35.3 °C) (Oral)   Resp 16   Ht 5' 2\" (1.575 m)   LMP 05/08/1981   SpO2 94%   BMI 31.39 kg/m²         Physical Exam  Physical Examination: General appearance - oriented to person, place, and time and acyanotic, in no respiratory distress  Mental status - alert, oriented to person, place, and time, euphoric  Lymphatics - no palpable lymphadenopathy, no hepatosplenomegaly  Chest - clear to auscultation, no wheezes, rales or rhonchi, symmetric air entry  Heart - normal rate and regular rhythm, S1 and S2 normal  Abdomen - no rebound tenderness noted  Back exam - limited range of motion  Neurological - motor and sensory grossly normal bilaterally  Musculoskeletal - no muscular tenderness noted  Extremities - no pedal edema noted    Results  Results for orders placed or performed during the hospital encounter of 05/30/19   TSH 3RD GENERATION   Result Value Ref Range    TSH 1.31 0.36 - 3.74 uIU/mL   CBC WITH AUTOMATED DIFF   Result Value Ref Range    WBC 8.9 4.6 - 13.2 K/uL    RBC 4.97 4.20 - 5.30 M/uL    HGB 14.3 12.0 - 16.0 g/dL    HCT 46.2 (H) 35.0 - 45.0 %    MCV 93.0 74.0 - 97.0 FL    MCH 28.8 24.0 - 34.0 PG    MCHC 31.0 31.0 - 37.0 g/dL    RDW 14.7 (H) 11.6 - 14.5 %    PLATELET 970 712 - 572 K/uL    MPV 10.5 9.2 - 11.8 FL    NEUTROPHILS 62 40 - 73 %    LYMPHOCYTES 25 21 - 52 %    MONOCYTES 7 3 - 10 %    EOSINOPHILS 4 0 - 5 %    BASOPHILS 2 0 - 2 %    ABS. NEUTROPHILS 5.7 1.8 - 8.0 K/UL    ABS. LYMPHOCYTES 2.2 0.9 - 3.6 K/UL    ABS. MONOCYTES 0.6 0.05 - 1.2 K/UL    ABS. EOSINOPHILS 0.3 0.0 - 0.4 K/UL    ABS. BASOPHILS 0.1 0.0 - 0.1 K/UL    DF AUTOMATED     METABOLIC PANEL, COMPREHENSIVE   Result Value Ref Range    Sodium 137 136 - 145 mmol/L    Potassium 4.5 3.5 - 5.5 mmol/L    Chloride 99 (L) 100 - 108 mmol/L    CO2 28 21 - 32 mmol/L    Anion gap 10 3.0 - 18 mmol/L    Glucose 97 74 - 99 mg/dL    BUN 14 7.0 - 18 MG/DL    Creatinine 0.86 0.6 - 1.3 MG/DL    BUN/Creatinine ratio 16 12 - 20      GFR est AA >60 >60 ml/min/1.73m2    GFR est non-AA >60 >60 ml/min/1.73m2    Calcium 10.0 8.5 - 10.1 MG/DL    Bilirubin, total 0.5 0.2 - 1.0 MG/DL    ALT (SGPT) 41 13 - 56 U/L    AST (SGOT) 25 15 - 37 U/L    Alk.  phosphatase 89 45 - 117 U/L Protein, total 8.3 (H) 6.4 - 8.2 g/dL    Albumin 4.7 3.4 - 5.0 g/dL    Globulin 3.6 2.0 - 4.0 g/dL    A-G Ratio 1.3 0.8 - 1.7     MAGNESIUM   Result Value Ref Range    Magnesium 2.2 1.6 - 2.6 mg/dL       ASSESSMENT and PLAN    ICD-10-CM ICD-9-CM    1. Dizziness R42 780.4    2. Essential hypertension I10 401.9    3. Anxiety F41.9 300.00 hydrOXYzine HCl (ATARAX) 25 mg tablet     lab results and schedule of future lab studies reviewed with patient  reviewed diet, exercise and weight control  cardiovascular risk and specific lipid/LDL goals reviewed  reviewed medications and side effects in detail      I have discussed the diagnosis with the patient and the intended plan of care as seen in the above orders. The patient has received an after-visit summary and questions were answered concerning future plans. I have discussed medication, side effects, and warnings with the patient in detail. The patient verbalized understanding and is in agreement with the plan of care. The patient will contact the office with any additional concerns. More than 50% of 30-minute visit spent counseling and coordinating care with patient face to face on anxiety, depression, clinical presentation and management of the same. Discussed need for compliance with treatment regimen, follow-up, and taking responsibility for her disease process. Amanda Miguel MD    PLEASE NOTE:   This document has been produced using voice recognition software.  Unrecognized errors in transcription may be present

## 2019-11-09 DIAGNOSIS — F41.9 ANXIETY: ICD-10-CM

## 2019-11-11 RX ORDER — HYDROXYZINE 25 MG/1
TABLET, FILM COATED ORAL
Qty: 30 TAB | Refills: 1 | Status: SHIPPED | OUTPATIENT
Start: 2019-11-11 | End: 2019-12-27 | Stop reason: SDUPTHER

## 2019-11-19 ENCOUNTER — OFFICE VISIT (OUTPATIENT)
Dept: FAMILY MEDICINE CLINIC | Age: 64
End: 2019-11-19

## 2019-11-19 VITALS
WEIGHT: 171 LBS | DIASTOLIC BLOOD PRESSURE: 74 MMHG | BODY MASS INDEX: 31.47 KG/M2 | OXYGEN SATURATION: 95 % | SYSTOLIC BLOOD PRESSURE: 130 MMHG | RESPIRATION RATE: 16 BRPM | HEART RATE: 93 BPM | HEIGHT: 62 IN | TEMPERATURE: 96.9 F

## 2019-11-19 DIAGNOSIS — G89.29 CHRONIC NONINTRACTABLE HEADACHE, UNSPECIFIED HEADACHE TYPE: Primary | ICD-10-CM

## 2019-11-19 DIAGNOSIS — I10 ESSENTIAL HYPERTENSION: ICD-10-CM

## 2019-11-19 DIAGNOSIS — R51.9 CHRONIC NONINTRACTABLE HEADACHE, UNSPECIFIED HEADACHE TYPE: Primary | ICD-10-CM

## 2019-11-19 DIAGNOSIS — F41.9 ANXIETY: ICD-10-CM

## 2019-11-19 DIAGNOSIS — N95.1 MENOPAUSAL AND FEMALE CLIMACTERIC STATES: ICD-10-CM

## 2019-11-19 DIAGNOSIS — E78.5 DYSLIPIDEMIA: ICD-10-CM

## 2019-11-19 RX ORDER — PAROXETINE 10 MG/1
10 TABLET, FILM COATED ORAL DAILY
Qty: 30 TAB | Refills: 3 | Status: SHIPPED | OUTPATIENT
Start: 2019-11-19 | End: 2020-04-01

## 2019-11-19 NOTE — PROGRESS NOTES
Chief Complaint   Patient presents with    Hypertension    Anxiety     1. Have you been to the ER, urgent care clinic since your last visit? Hospitalized since your last visit? No    2. Have you seen or consulted any other health care providers outside of the [address] [place] [address] since your last visit? Include any pap smears or colon screening. No     HPI  Shannon Mclaughlin comes in for f/u care. HTN: Patient has hypertension. Blood pressure is well controlled. She is on amlodipine and HCTZ. We will continue with the current treatment plan. Headache: Patient has a history of intractable migraine headache. She takes over-the-counter medication. Has used Aleve and ibuprofen in the past with relief of the headache. We had referred her to the neurologist previously given the chronicity of the headache. She did not go for the visit but she would like to be referred to see a neurologist at the moment. She denies changes in vision or focal weakness. No nausea or vomiting. Referral is placed. PMS: Patient has menopausal symptoms. She does get hot flashes especially at night. She has used Paxil in the past.  I will give her a refill of this medication. Dyslipidemia: Patient has dyslipidemia. She is on atorvastatin. Tolerating the medication. We will continue with the current treatment plan. Anxiety: Patient has anxiety. She is using hydroxyzine with good result. We will send in a refill of medication. She also will take Paxil that will help with anxiety symptoms. COPD: Patient has been seen by the pulmonologist.  We did discontinue metoprolol given it is a beta-blocker. She is on inhaler medications. Stable. No chest pain, wheeze, cough or shortness of breath.     Past Medical History  Past Medical History:   Diagnosis Date    Acid reflux     Anemia     COPD (chronic obstructive pulmonary disease) (HCC)     Cystic disease of kidney     GERD (gastroesophageal reflux disease)     Goiter  Hemorrhoid     History of blood transfusion     HTN (hypertension)     Hyperlipidemia     Hypertension     Renal cyst        Surgical History  Past Surgical History:   Procedure Laterality Date    HX COLONOSCOPY      HX ENDOSCOPY      HX HEENT  2011    Partial thyroid    HX HYSTERECTOMY      HX ORTHOPAEDIC  2005    left shoulder sugery    HX PARTIAL HYSTERECTOMY  1990    HX THYROIDECTOMY          Medications  Current Outpatient Medications   Medication Sig Dispense Refill    hydrOXYzine HCl (ATARAX) 25 mg tablet TAKE 1 TABLET BY MOUTH THREE TIMES DAILY AS NEEDED FOR ANXIETY FOR  UP  TO  10  DAYS 30 Tab 1    atorvastatin (LIPITOR) 10 mg tablet Take 1 Tab by mouth daily. 30 Tab 2    fluticasone propionate (FLONASE) 50 mcg/actuation nasal spray USE 2 SPRAY(S) IN EACH NOSTRIL ONCE DAILY 1 Bottle 1    amLODIPine (NORVASC) 10 mg tablet Take 1 Tab by mouth daily. 30 Tab 2    hydroCHLOROthiazide (HYDRODIURIL) 12.5 mg tablet Take 1 Tab by mouth daily. 30 Tab 2    metoprolol succinate (TOPROL-XL) 25 mg XL tablet TAKE 1 TABLET BY MOUTH ONCE DAILY 30 Tab 2    benzonatate (TESSALON) 200 mg capsule TAKE 1 CAPSULE BY MOUTH THREE TIMES DAILY AS NEEDED FOR COUGH 30 Cap 2    ergocalciferol (ERGOCALCIFEROL) 50,000 unit capsule Take 1 Cap by mouth every seven (7) days. 13 Cap 1    umeclidinium-vilanterol (ANORO ELLIPTA) 62.5-25 mcg/actuation inhaler Take 1 Puff by inhalation daily.  aspirin delayed-release 81 mg tablet 81 mg.      albuterol (PROVENTIL HFA, VENTOLIN HFA, PROAIR HFA) 90 mcg/actuation inhaler Take  by inhalation.  omeprazole (PRILOSEC) 20 mg capsule Take 20 mg by mouth daily.          Allergies  Allergies   Allergen Reactions    Latex, Natural Rubber Rash       Family History  Family History   Problem Relation Age of Onset    Diabetes Mother     Hypertension Mother     Cancer Father     Hypertension Father        Social History  Social History     Socioeconomic History    Marital status: SINGLE     Spouse name: Not on file    Number of children: Not on file    Years of education: Not on file    Highest education level: Not on file   Occupational History    Not on file   Social Needs    Financial resource strain: Not on file    Food insecurity:     Worry: Not on file     Inability: Not on file    Transportation needs:     Medical: Not on file     Non-medical: Not on file   Tobacco Use    Smoking status: Former Smoker     Packs/day: 0.30     Years: 27.00     Pack years: 8.10     Types: Cigarettes    Smokeless tobacco: Never Used   Substance and Sexual Activity    Alcohol use: No     Alcohol/week: 0.0 standard drinks    Drug use: No    Sexual activity: Never   Lifestyle    Physical activity:     Days per week: Not on file     Minutes per session: Not on file    Stress: Not on file   Relationships    Social connections:     Talks on phone: Not on file     Gets together: Not on file     Attends Caodaism service: Not on file     Active member of club or organization: Not on file     Attends meetings of clubs or organizations: Not on file     Relationship status: Not on file    Intimate partner violence:     Fear of current or ex partner: Not on file     Emotionally abused: Not on file     Physically abused: Not on file     Forced sexual activity: Not on file   Other Topics Concern    Not on file   Social History Narrative    ** Merged History Encounter **            Review of Systems  Review of Systems - Review of all systems is negative except as noted above in the HPI.     Vital Signs  Visit Vitals  /74 (BP 1 Location: Left arm, BP Patient Position: Sitting)   Pulse 93   Temp 96.9 °F (36.1 °C) (Oral)   Resp 16   Ht 5' 2\" (1.575 m)   Wt 171 lb (77.6 kg)   LMP 05/08/1981   SpO2 95%   BMI 31.28 kg/m²         Physical Exam  Physical Examination: General appearance - alert, well appearing, and in no distress, oriented to person, place, and time and acyanotic, in no respiratory distress  Mental status - alert, oriented to person, place, and time, affect appropriate to mood  Neck - supple, no significant adenopathy  Lymphatics - no palpable lymphadenopathy, no hepatosplenomegaly  Chest - clear to auscultation, no wheezes, rales or rhonchi, symmetric air entry, no tachypnea, retractions or cyanosis  Heart - normal rate and regular rhythm, S1 and S2 normal  Back exam - limited range of motion  Neurological - motor and sensory grossly normal bilaterally  Musculoskeletal - no muscular tenderness noted  Extremities - intact peripheral pulses    Results  Results for orders placed or performed during the hospital encounter of 05/30/19   TSH 3RD GENERATION   Result Value Ref Range    TSH 1.31 0.36 - 3.74 uIU/mL   CBC WITH AUTOMATED DIFF   Result Value Ref Range    WBC 8.9 4.6 - 13.2 K/uL    RBC 4.97 4.20 - 5.30 M/uL    HGB 14.3 12.0 - 16.0 g/dL    HCT 46.2 (H) 35.0 - 45.0 %    MCV 93.0 74.0 - 97.0 FL    MCH 28.8 24.0 - 34.0 PG    MCHC 31.0 31.0 - 37.0 g/dL    RDW 14.7 (H) 11.6 - 14.5 %    PLATELET 930 023 - 240 K/uL    MPV 10.5 9.2 - 11.8 FL    NEUTROPHILS 62 40 - 73 %    LYMPHOCYTES 25 21 - 52 %    MONOCYTES 7 3 - 10 %    EOSINOPHILS 4 0 - 5 %    BASOPHILS 2 0 - 2 %    ABS. NEUTROPHILS 5.7 1.8 - 8.0 K/UL    ABS. LYMPHOCYTES 2.2 0.9 - 3.6 K/UL    ABS. MONOCYTES 0.6 0.05 - 1.2 K/UL    ABS. EOSINOPHILS 0.3 0.0 - 0.4 K/UL    ABS.  BASOPHILS 0.1 0.0 - 0.1 K/UL    DF AUTOMATED     METABOLIC PANEL, COMPREHENSIVE   Result Value Ref Range    Sodium 137 136 - 145 mmol/L    Potassium 4.5 3.5 - 5.5 mmol/L    Chloride 99 (L) 100 - 108 mmol/L    CO2 28 21 - 32 mmol/L    Anion gap 10 3.0 - 18 mmol/L    Glucose 97 74 - 99 mg/dL    BUN 14 7.0 - 18 MG/DL    Creatinine 0.86 0.6 - 1.3 MG/DL    BUN/Creatinine ratio 16 12 - 20      GFR est AA >60 >60 ml/min/1.73m2    GFR est non-AA >60 >60 ml/min/1.73m2    Calcium 10.0 8.5 - 10.1 MG/DL    Bilirubin, total 0.5 0.2 - 1.0 MG/DL    ALT (SGPT) 41 13 - 56 U/L    AST (SGOT) 25 15 - 37 U/L    Alk. phosphatase 89 45 - 117 U/L    Protein, total 8.3 (H) 6.4 - 8.2 g/dL    Albumin 4.7 3.4 - 5.0 g/dL    Globulin 3.6 2.0 - 4.0 g/dL    A-G Ratio 1.3 0.8 - 1.7     MAGNESIUM   Result Value Ref Range    Magnesium 2.2 1.6 - 2.6 mg/dL       ASSESSMENT and PLAN    ICD-10-CM ICD-9-CM    1. Chronic nonintractable headache, unspecified headache type R51 784.0 REFERRAL TO NEUROLOGY   2. Anxiety F41.9 300.00    3. Menopausal and female climacteric states N95.1 627.2 PARoxetine (PAXIL) 10 mg tablet   4. Essential hypertension I10 401.9    5. Dyslipidemia E78.5 272.4      reviewed diet, exercise and weight control  reviewed medications and side effects in detail    I have discussed the diagnosis with the patient and the intended plan of care as seen in the above orders. The patient has received an after-visit summary and questions were answered concerning future plans. I have discussed medication, side effects, and warnings with the patient in detail. The patient verbalized understanding and is in agreement with the plan of care. The patient will contact the office with any additional concerns. Marsha Drake MD    PLEASE NOTE:   This document has been produced using voice recognition software.  Unrecognized errors in transcription may be present

## 2019-11-24 DIAGNOSIS — E55.9 HYPOVITAMINOSIS D: ICD-10-CM

## 2019-11-25 RX ORDER — ERGOCALCIFEROL 1.25 MG/1
CAPSULE ORAL
Qty: 13 CAP | Refills: 1 | Status: SHIPPED | OUTPATIENT
Start: 2019-11-25 | End: 2020-06-29

## 2019-12-03 DIAGNOSIS — I10 ESSENTIAL HYPERTENSION: ICD-10-CM

## 2019-12-03 RX ORDER — HYDROCHLOROTHIAZIDE 12.5 MG/1
TABLET ORAL
Qty: 30 TAB | Refills: 2 | Status: SHIPPED | OUTPATIENT
Start: 2019-12-03 | End: 2020-03-09

## 2019-12-09 DIAGNOSIS — R05.9 COUGH: ICD-10-CM

## 2019-12-09 RX ORDER — BENZONATATE 200 MG/1
CAPSULE ORAL
Qty: 30 CAP | Refills: 2 | Status: SHIPPED | OUTPATIENT
Start: 2019-12-09 | End: 2020-03-12 | Stop reason: SDUPTHER

## 2019-12-10 ENCOUNTER — OFFICE VISIT (OUTPATIENT)
Dept: FAMILY MEDICINE CLINIC | Age: 64
End: 2019-12-10

## 2019-12-10 VITALS
SYSTOLIC BLOOD PRESSURE: 106 MMHG | TEMPERATURE: 100 F | WEIGHT: 169 LBS | HEART RATE: 129 BPM | RESPIRATION RATE: 16 BRPM | OXYGEN SATURATION: 96 % | HEIGHT: 62 IN | DIASTOLIC BLOOD PRESSURE: 73 MMHG | BODY MASS INDEX: 31.1 KG/M2

## 2019-12-10 DIAGNOSIS — J44.1 CHRONIC OBSTRUCTIVE PULMONARY DISEASE WITH ACUTE EXACERBATION (HCC): ICD-10-CM

## 2019-12-10 DIAGNOSIS — I10 ESSENTIAL HYPERTENSION: ICD-10-CM

## 2019-12-10 DIAGNOSIS — R05.9 COUGH: Primary | ICD-10-CM

## 2019-12-10 DIAGNOSIS — R00.0 TACHYCARDIA: ICD-10-CM

## 2019-12-10 LAB
FLUAV+FLUBV AG NOSE QL IA.RAPID: NEGATIVE POS/NEG
FLUAV+FLUBV AG NOSE QL IA.RAPID: NEGATIVE POS/NEG
VALID INTERNAL CONTROL?: YES

## 2019-12-10 RX ORDER — MOXIFLOXACIN HYDROCHLORIDE 400 MG/1
400 TABLET ORAL DAILY
Qty: 10 TAB | Refills: 1 | Status: SHIPPED | OUTPATIENT
Start: 2019-12-10 | End: 2019-12-27 | Stop reason: ALTCHOICE

## 2019-12-10 NOTE — PROGRESS NOTES
Chief Complaint   Patient presents with    Cough     chest congestion     Generalized Body Aches     fever      1. Have you been to the ER, urgent care clinic since your last visit? Hospitalized since your last visit? No    2. Have you seen or consulted any other health care providers outside of the [address] [place] [address] since your last visit? Include any pap smears or colon screening. No     HPI  Marcelo Romero comes in for f/u care. She has cough which is productive of mucopurulent phlegm. Has wheeze and SOB with chest tightness. She has been using her nebulizer treatments with minimal relief. She has been watching her oxygen sats and this goes down to 88% with activity. She has sinus sinus area pressure and drainage. She has nausea and vomiting. Also has generalized body aches, fever and chills. Patient noted to have tachycardia. Her pulse is 126. She denies chest pain or diaphoresis. Requested to do an EKG but patient declines to have this done today. Patient has fever with temperature 100. Given the cough with mucopurulent phlegm and the fever did want to do a chest x-ray to rule out pneumonia. Patient complains that it is too cold outside and she does not want to take off any garments to have a chest x-ray done. Discussed the need to go to the emergency room for evaluation of pneumonia. Patient is agreeable to this. States that she will go to the emergency room. She will continue with her inhaler medication. Patient will go to the emergency room to get chest x-ray and EKG done for evaluation of possible pneumonia. I had sent in a prescription of Avelox earlier but after discussion with the patient she will go to the emergency room for evaluation today. Did flu test that is negative. Patient uses Anoro Ellipta and albuterol inhalers. Patient has hypertension. Blood pressure today is stable. She is on amlodipine and HCTZ. She denies headache or changes in vision or focal weakness.     Past Medical History  Past Medical History:   Diagnosis Date    Acid reflux     Anemia     COPD (chronic obstructive pulmonary disease) (HCC)     Cystic disease of kidney     GERD (gastroesophageal reflux disease)     Goiter     Hemorrhoid     History of blood transfusion     HTN (hypertension)     Hyperlipidemia     Hypertension     Renal cyst        Surgical History  Past Surgical History:   Procedure Laterality Date    HX COLONOSCOPY      HX ENDOSCOPY      HX HEENT  2011    Partial thyroid    HX HYSTERECTOMY      HX ORTHOPAEDIC  2005    left shoulder sugery    HX PARTIAL HYSTERECTOMY  1990    HX THYROIDECTOMY          Medications  Current Outpatient Medications   Medication Sig Dispense Refill    moxifloxacin (AVELOX) 400 mg tablet Take 1 Tab by mouth daily. 10 Tab 1    benzonatate (TESSALON) 200 mg capsule TAKE 1 CAPSULE BY MOUTH THREE TIMES DAILY AS NEEDED FOR COUGH 30 Cap 2    hydroCHLOROthiazide (HYDRODIURIL) 12.5 mg tablet TAKE 1 TABLET BY MOUTH ONCE DAILY 30 Tab 2    VITAMIN D2 50,000 unit capsule TAKE 1 CAPSULE BY MOUTH ONCE A WEEK (EVERY  7  DAYS) 13 Cap 1    PARoxetine (PAXIL) 10 mg tablet Take 1 Tab by mouth daily. 30 Tab 3    hydrOXYzine HCl (ATARAX) 25 mg tablet TAKE 1 TABLET BY MOUTH THREE TIMES DAILY AS NEEDED FOR ANXIETY FOR  UP  TO  10  DAYS 30 Tab 1    atorvastatin (LIPITOR) 10 mg tablet Take 1 Tab by mouth daily. 30 Tab 2    fluticasone propionate (FLONASE) 50 mcg/actuation nasal spray USE 2 SPRAY(S) IN EACH NOSTRIL ONCE DAILY 1 Bottle 1    amLODIPine (NORVASC) 10 mg tablet Take 1 Tab by mouth daily. 30 Tab 2    umeclidinium-vilanterol (ANORO ELLIPTA) 62.5-25 mcg/actuation inhaler Take 1 Puff by inhalation daily.  aspirin delayed-release 81 mg tablet 81 mg.      albuterol (PROVENTIL HFA, VENTOLIN HFA, PROAIR HFA) 90 mcg/actuation inhaler Take  by inhalation.  omeprazole (PRILOSEC) 20 mg capsule Take 20 mg by mouth daily.          Allergies  Allergies   Allergen Reactions    Latex, Natural Rubber Rash       Family History  Family History   Problem Relation Age of Onset    Diabetes Mother     Hypertension Mother     Cancer Father     Hypertension Father        Social History  Social History     Socioeconomic History    Marital status: SINGLE     Spouse name: Not on file    Number of children: Not on file    Years of education: Not on file    Highest education level: Not on file   Occupational History    Not on file   Social Needs    Financial resource strain: Not on file    Food insecurity:     Worry: Not on file     Inability: Not on file    Transportation needs:     Medical: Not on file     Non-medical: Not on file   Tobacco Use    Smoking status: Former Smoker     Packs/day: 0.30     Years: 27.00     Pack years: 8.10     Types: Cigarettes    Smokeless tobacco: Never Used   Substance and Sexual Activity    Alcohol use: No     Alcohol/week: 0.0 standard drinks    Drug use: No    Sexual activity: Never   Lifestyle    Physical activity:     Days per week: Not on file     Minutes per session: Not on file    Stress: Not on file   Relationships    Social connections:     Talks on phone: Not on file     Gets together: Not on file     Attends Rastafarian service: Not on file     Active member of club or organization: Not on file     Attends meetings of clubs or organizations: Not on file     Relationship status: Not on file    Intimate partner violence:     Fear of current or ex partner: Not on file     Emotionally abused: Not on file     Physically abused: Not on file     Forced sexual activity: Not on file   Other Topics Concern    Not on file   Social History Narrative    ** Merged History Encounter **            Review of Systems  Review of Systems - Review of all systems is negative except as noted above in the HPI.     Vital Signs  Visit Vitals  /73 (BP 1 Location: Left arm, BP Patient Position: Sitting)   Pulse (!) 129   Temp 100 °F (37.8 °C) (Oral)   Resp 16   Ht 5' 2\" (1.575 m)   Wt 169 lb (76.7 kg)   LMP 05/08/1981   SpO2 96%   BMI 30.91 kg/m²         Physical Exam  Physical Examination: General appearance - oriented to person, place, and time and ill-appearing  Mental status - affect appropriate to mood  Nose - mucosal congestion, mucosal erythema and purulent rhinorrhea  Mouth - mucous membranes moist, pharynx normal without lesions  Neck - supple, no significant adenopathy  Lymphatics - no palpable lymphadenopathy  Chest - rales noted bilateral lung bases, decreased air entry noted bilateral lung bases  Heart -tachycardia  Abdomen - no rebound tenderness noted  Back exam - limited range of motion  Neurological - neck supple without rigidity  Musculoskeletal - osteoarthritic changes noted in both hands  Extremities - intact peripheral pulses    Results  Results for orders placed or performed during the hospital encounter of 05/30/19   TSH 3RD GENERATION   Result Value Ref Range    TSH 1.31 0.36 - 3.74 uIU/mL   CBC WITH AUTOMATED DIFF   Result Value Ref Range    WBC 8.9 4.6 - 13.2 K/uL    RBC 4.97 4.20 - 5.30 M/uL    HGB 14.3 12.0 - 16.0 g/dL    HCT 46.2 (H) 35.0 - 45.0 %    MCV 93.0 74.0 - 97.0 FL    MCH 28.8 24.0 - 34.0 PG    MCHC 31.0 31.0 - 37.0 g/dL    RDW 14.7 (H) 11.6 - 14.5 %    PLATELET 611 475 - 221 K/uL    MPV 10.5 9.2 - 11.8 FL    NEUTROPHILS 62 40 - 73 %    LYMPHOCYTES 25 21 - 52 %    MONOCYTES 7 3 - 10 %    EOSINOPHILS 4 0 - 5 %    BASOPHILS 2 0 - 2 %    ABS. NEUTROPHILS 5.7 1.8 - 8.0 K/UL    ABS. LYMPHOCYTES 2.2 0.9 - 3.6 K/UL    ABS. MONOCYTES 0.6 0.05 - 1.2 K/UL    ABS. EOSINOPHILS 0.3 0.0 - 0.4 K/UL    ABS.  BASOPHILS 0.1 0.0 - 0.1 K/UL    DF AUTOMATED     METABOLIC PANEL, COMPREHENSIVE   Result Value Ref Range    Sodium 137 136 - 145 mmol/L    Potassium 4.5 3.5 - 5.5 mmol/L    Chloride 99 (L) 100 - 108 mmol/L    CO2 28 21 - 32 mmol/L    Anion gap 10 3.0 - 18 mmol/L    Glucose 97 74 - 99 mg/dL    BUN 14 7.0 - 18 MG/DL    Creatinine 0. 86 0.6 - 1.3 MG/DL    BUN/Creatinine ratio 16 12 - 20      GFR est AA >60 >60 ml/min/1.73m2    GFR est non-AA >60 >60 ml/min/1.73m2    Calcium 10.0 8.5 - 10.1 MG/DL    Bilirubin, total 0.5 0.2 - 1.0 MG/DL    ALT (SGPT) 41 13 - 56 U/L    AST (SGOT) 25 15 - 37 U/L    Alk. phosphatase 89 45 - 117 U/L    Protein, total 8.3 (H) 6.4 - 8.2 g/dL    Albumin 4.7 3.4 - 5.0 g/dL    Globulin 3.6 2.0 - 4.0 g/dL    A-G Ratio 1.3 0.8 - 1.7     MAGNESIUM   Result Value Ref Range    Magnesium 2.2 1.6 - 2.6 mg/dL       ASSESSMENT and PLAN    ICD-10-CM ICD-9-CM    1. Cough R05 786.2 AMB POC CB INFLUENZA A/B TEST      XR CHEST PA LAT   2. Tachycardia R00.0 785.0 AMB POC EKG ROUTINE W/ 12 LEADS, INTER & REP   3. Chronic obstructive pulmonary disease with acute exacerbation (HCC) J44.1 491.21 moxifloxacin (AVELOX) 400 mg tablet   4. Essential hypertension I10 401.9      lab results and schedule of future lab studies reviewed with patient  reviewed diet, exercise and weight control  reviewed medications and side effects in detail  radiology results and schedule of future radiology studies reviewed with patient    I have discussed the diagnosis with the patient and the intended plan of care as seen in the above orders. The patient has received an after-visit summary and questions were answered concerning future plans. I have discussed medication, side effects, and warnings with the patient in detail. The patient verbalized understanding and is in agreement with the plan of care. The patient will contact the office with any additional concerns. More than 50% of 45-minute visit spent counseling and coordinating care with patient face to face on pneumonia, COPD, tachycardia and management options. Discussed need for compliance with treatment regimen, follow-up, and taking responsibility for her disease process. Tona Brittle, MD    PLEASE NOTE:   This document has been produced using voice recognition software.  Unrecognized errors in transcription may be present

## 2019-12-10 NOTE — PATIENT INSTRUCTIONS
Patient seen with tachycardia, fever and cough. Needs to have radiological studies to evaluate for pneumonia. She has heart rate 126. Needs EKG. May need IV fluids and inpatient care. Sent to ED for investigation, work up and management. She wonders about inpatient care. Will need to go to ED for the evaluation.

## 2019-12-26 DIAGNOSIS — F41.9 ANXIETY: ICD-10-CM

## 2019-12-27 ENCOUNTER — OFFICE VISIT (OUTPATIENT)
Dept: FAMILY MEDICINE CLINIC | Age: 64
End: 2019-12-27

## 2019-12-27 VITALS
HEIGHT: 62 IN | HEART RATE: 99 BPM | WEIGHT: 170.2 LBS | BODY MASS INDEX: 31.32 KG/M2 | DIASTOLIC BLOOD PRESSURE: 63 MMHG | OXYGEN SATURATION: 94 % | RESPIRATION RATE: 16 BRPM | SYSTOLIC BLOOD PRESSURE: 111 MMHG | TEMPERATURE: 96.7 F

## 2019-12-27 DIAGNOSIS — J02.9 SORE THROAT: ICD-10-CM

## 2019-12-27 DIAGNOSIS — F41.9 ANXIETY: ICD-10-CM

## 2019-12-27 DIAGNOSIS — J02.0 STREP THROAT: Primary | ICD-10-CM

## 2019-12-27 LAB
S PYO AG THROAT QL: POSITIVE
VALID INTERNAL CONTROL?: YES

## 2019-12-27 RX ORDER — AMOXICILLIN 500 MG/1
500 CAPSULE ORAL 2 TIMES DAILY
Qty: 20 CAP | Refills: 0 | Status: SHIPPED | OUTPATIENT
Start: 2019-12-27 | End: 2020-01-06

## 2019-12-27 RX ORDER — HYDROXYZINE 25 MG/1
TABLET, FILM COATED ORAL
Qty: 30 TAB | Refills: 1 | Status: SHIPPED | OUTPATIENT
Start: 2019-12-27 | End: 2020-02-03

## 2019-12-27 NOTE — PROGRESS NOTES
Leidy King presents today for   Chief Complaint   Patient presents with    Cough     ongoing    Sore Throat     ongoing       Is someone accompanying this pt? no    Is the patient using any DME equipment during OV? no    Depression Screening:  3 most recent PHQ Screens 12/27/2019   Little interest or pleasure in doing things Not at all   Feeling down, depressed, irritable, or hopeless Not at all   Total Score PHQ 2 0       Learning Assessment:  Learning Assessment 10/2/2018   PRIMARY LEARNER Patient   HIGHEST LEVEL OF EDUCATION - PRIMARY LEARNER  GRADUATED HIGH SCHOOL OR GED   BARRIERS PRIMARY LEARNER NONE   PRIMARY LANGUAGE ENGLISH   LEARNER PREFERENCE PRIMARY DEMONSTRATION     -   ANSWERED BY patient   RELATIONSHIP SELF       Abuse Screening:  No flowsheet data found. Fall Risk  Fall Risk Assessment, last 12 mths 12/27/2019   Able to walk? Yes   Fall in past 12 months? No       Health Maintenance reviewed and discussed and ordered per Provider. Health Maintenance Due   Topic Date Due    DTaP/Tdap/Td series (1 - Tdap) 01/27/1965    Shingrix Vaccine Age 50> (1 of 2) 01/27/2004    GLAUCOMA SCREENING Q2Y  01/27/2019   . Coordination of Care:  1. Have you been to the ER, urgent care clinic since your last visit? Hospitalized since your last visit? no    2. Have you seen or consulted any other health care providers outside of the [address] [place] [address] since your last visit? Include any pap smears or colon screening.  no

## 2019-12-27 NOTE — PATIENT INSTRUCTIONS

## 2019-12-27 NOTE — PROGRESS NOTES
PROBLEM/SICK OFFICE NOTE (SOAP)    12/27/2019  1:26 PM    SUBJECTIVE:    Chief Complaint   Patient presents with    Cough     ongoing    Sore Throat     ongoing       HPI:  Lorraine Enriquez is a 71 y.o. female presenting today for office visit. Has not felt good since last seeing Dr. Opal Hernandez. She has finished antibiotics he prescribed (aveolx). At last visit she had wheezing, SOB, chest tightness, low O2 sats and tachycardia. He recommended EKG and chest xray, but she declined. He recommended follow up in ER, for chest xray and EKG, but she did not do this. She is most concerned for a sore throat today, which has been ongoing but seems to be the one symptom really lingering. She recently found out her granddaughter has strep throat. She reports feeling hot and sweating, has not measured a temperature. Rapid strep positive today. Also asking for refill of anxiety medication, atarax. She states this is why she thinks her heart rate is rapid. She states in the past she has followed with cardiology who have not found any reason for rapid heart rate. Refilled and told to follow up with Dr. Opal Hernandez for further management. Review of Systems:  Review of Systems   Constitutional: Positive for diaphoresis. Negative for chills, fatigue and fever. HENT: Positive for congestion. Negative for sinus pressure, sinus pain and sore throat. Respiratory: Positive for cough. Negative for choking, shortness of breath and wheezing. Cardiovascular: Negative for chest pain and palpitations. Gastrointestinal: Negative for abdominal pain, diarrhea, nausea and vomiting. Neurological: Negative for syncope and headaches.        Depression- PHQ Screening   3 most recent PHQ Screens 12/27/2019   Little interest or pleasure in doing things Not at all   Feeling down, depressed, irritable, or hopeless Not at all   Total Score PHQ 2 0         History  Past Medical History:   Diagnosis Date    Acid reflux     Anemia     COPD (chronic obstructive pulmonary disease) (HCC)     Cystic disease of kidney     GERD (gastroesophageal reflux disease)     Hemorrhoid     History of blood transfusion     HTN (hypertension)     Hyperlipidemia     Hypertension     Renal cyst     Thyroid disease        Past Surgical History:   Procedure Laterality Date    HX COLONOSCOPY      HX ENDOSCOPY      HX HEENT  2011    Partial thyroid    HX HYSTERECTOMY      HX ORTHOPAEDIC  2005    left shoulder sugery    HX PARTIAL HYSTERECTOMY  1990    HX THYROIDECTOMY         Social History     Socioeconomic History    Marital status: SINGLE     Spouse name: Not on file    Number of children: Not on file    Years of education: Not on file    Highest education level: Not on file   Occupational History    Not on file   Social Needs    Financial resource strain: Not on file    Food insecurity:     Worry: Not on file     Inability: Not on file    Transportation needs:     Medical: Not on file     Non-medical: Not on file   Tobacco Use    Smoking status: Former Smoker     Packs/day: 0.30     Years: 27.00     Pack years: 8.10     Types: Cigarettes    Smokeless tobacco: Never Used   Substance and Sexual Activity    Alcohol use: No     Alcohol/week: 0.0 standard drinks    Drug use: No    Sexual activity: Never   Lifestyle    Physical activity:     Days per week: Not on file     Minutes per session: Not on file    Stress: Not on file   Relationships    Social connections:     Talks on phone: Not on file     Gets together: Not on file     Attends Taoism service: Not on file     Active member of club or organization: Not on file     Attends meetings of clubs or organizations: Not on file     Relationship status: Not on file    Intimate partner violence:     Fear of current or ex partner: Not on file     Emotionally abused: Not on file     Physically abused: Not on file     Forced sexual activity: Not on file   Other Topics Concern    Not on file   Social History Narrative    ** Merged History Encounter **            Allergies   Allergen Reactions    Latex, Natural Rubber Rash       Current Outpatient Medications   Medication Sig Dispense Refill    moxifloxacin (AVELOX) 400 mg tablet Take 1 Tab by mouth daily. 10 Tab 1    benzonatate (TESSALON) 200 mg capsule TAKE 1 CAPSULE BY MOUTH THREE TIMES DAILY AS NEEDED FOR COUGH 30 Cap 2    hydroCHLOROthiazide (HYDRODIURIL) 12.5 mg tablet TAKE 1 TABLET BY MOUTH ONCE DAILY 30 Tab 2    VITAMIN D2 50,000 unit capsule TAKE 1 CAPSULE BY MOUTH ONCE A WEEK (EVERY  7  DAYS) 13 Cap 1    PARoxetine (PAXIL) 10 mg tablet Take 1 Tab by mouth daily. 30 Tab 3    hydrOXYzine HCl (ATARAX) 25 mg tablet TAKE 1 TABLET BY MOUTH THREE TIMES DAILY AS NEEDED FOR ANXIETY FOR  UP  TO  10  DAYS 30 Tab 1    atorvastatin (LIPITOR) 10 mg tablet Take 1 Tab by mouth daily. 30 Tab 2    fluticasone propionate (FLONASE) 50 mcg/actuation nasal spray USE 2 SPRAY(S) IN EACH NOSTRIL ONCE DAILY 1 Bottle 1    amLODIPine (NORVASC) 10 mg tablet Take 1 Tab by mouth daily. 30 Tab 2    umeclidinium-vilanterol (ANORO ELLIPTA) 62.5-25 mcg/actuation inhaler Take 1 Puff by inhalation daily.  aspirin delayed-release 81 mg tablet 81 mg.      albuterol (PROVENTIL HFA, VENTOLIN HFA, PROAIR HFA) 90 mcg/actuation inhaler Take  by inhalation.  omeprazole (PRILOSEC) 20 mg capsule Take 20 mg by mouth daily.              Patient Care Team:  Patient Care Team:  Ankita Flowers MD as PCP - General (Family Practice)  Ankita Flowers MD as PCP - [facility]  Onofre Zazueta MD (Family Practice)  Niraj Dougherty MD (Surgery)  Franca Bear PA-C (Physician Assistant)  June Cabello MD (Ophthalmology)  Nathalie Patrick MD (Pulmonary Disease)  Rajesh Dvaid MD (Gastroenterology)        OBJECTIVE:    Vitals:    12/27/19 1258   BP: 111/63   Pulse: 99   Resp: 16   Temp: 96.7 °F (35.9 °C)   TempSrc: Oral   SpO2: 94%   Weight: 170 lb 3.2 oz (77.2 kg)   Height: 5' 2\" (1.575 m)   PainSc:   7   PainLoc: Head   LMP: 05/08/1981       Physical Exam  Vitals signs and nursing note reviewed. Constitutional:       General: She is not in acute distress. Appearance: Normal appearance. She is not ill-appearing or diaphoretic. HENT:      Right Ear: Tympanic membrane, ear canal and external ear normal. Tympanic membrane is not erythematous, retracted or bulging. Left Ear: Tympanic membrane, ear canal and external ear normal. Tympanic membrane is not erythematous, retracted or bulging. Nose: Nose normal.      Mouth/Throat:      Lips: Pink. Mouth: Mucous membranes are moist. No oral lesions. Tongue: No lesions. Palate: No lesions. Palate does not elevate in midline. Pharynx: Posterior oropharyngeal erythema present. No pharyngeal swelling or oropharyngeal exudate. Tonsils: No tonsillar exudate. Neck:      Musculoskeletal: Normal range of motion. Muscular tenderness present. Cardiovascular:      Rate and Rhythm: Regular rhythm. Tachycardia present. Pulses: Normal pulses. Heart sounds: Normal heart sounds. No murmur. Pulmonary:      Effort: Pulmonary effort is normal. No respiratory distress. Breath sounds: Normal breath sounds. No wheezing or rhonchi. Musculoskeletal: Normal range of motion. Lymphadenopathy:      Cervical: Cervical adenopathy present. Skin:     General: Skin is warm and dry. Neurological:      Mental Status: She is alert and oriented to person, place, and time. Psychiatric:         Mood and Affect: Mood normal.         Behavior: Behavior normal.         Assessment & Plan:      Sore throat  Rapid strep test positive today    - [facility] POC RAPID STREP A  Strep throat  Will prescribe amoxicillin for treatment of strep throat.  Advised on symptom management including tylenol for fever or throat pain, ice pops to soothe throat, warm liquids, honey, plenty of fluids and soft foods. Advised to change toothbrush in 2 days, to avoid kissing, sharing drinks, and to do good hand washing.    - amoxicillin (AMOXIL) 500 mg capsule; Take 1 Cap by mouth two (2) times a day for 10 days. Dispense: 20 Cap; Refill: 0    Anxiety  Refilled per patient request. Advised to follow up with Dr. Xavi Haley, apt scheduled 02/05.    - hydrOXYzine HCl (ATARAX) 25 mg tablet; TAKE 1 TABLET BY MOUTH THREE TIMES DAILY AS NEEDED FOR ANXIETY FOR  UP  TO  10  DAYS  Dispense: 30 Tab; Refill: 1        Orders Placed This Encounter    AMB POC RAPID STREP A    amoxicillin (AMOXIL) 500 mg capsule     Sig: Take 1 Cap by mouth two (2) times a day for 10 days. Dispense:  20 Cap     Refill:  0    hydrOXYzine HCl (ATARAX) 25 mg tablet     Sig: TAKE 1 TABLET BY MOUTH THREE TIMES DAILY AS NEEDED FOR ANXIETY FOR  UP  TO  10  DAYS     Dispense:  30 Tab     Refill:  1     Please consider 90 day supplies to promote better adherence            Follow-up and Dispositions    · Return if symptoms worsen or fail to improve, for follow up with dr. Joy Bonilla as scheduled. Plan of care reviewed with patient. Patient in agreement with plan and expresses understanding. I have discussed when to anticipate results and how results will be communicated, if applicable. Anticipatory guidance given and questions answered, patient encouraged to call or RTO if further questions or concerns.     Angelo Madsen NP  12/27/19

## 2019-12-29 RX ORDER — HYDROXYZINE 25 MG/1
TABLET, FILM COATED ORAL
Qty: 30 TAB | Refills: 0 | Status: SHIPPED | OUTPATIENT
Start: 2019-12-29 | End: 2020-02-17

## 2020-01-30 ENCOUNTER — TELEPHONE (OUTPATIENT)
Dept: FAMILY MEDICINE CLINIC | Age: 65
End: 2020-01-30

## 2020-01-30 NOTE — TELEPHONE ENCOUNTER
Patient is requesting to be contacted for advice on what to take for dark green mucus she is coughing up.  Please contact pt when available [phone]

## 2020-01-31 DIAGNOSIS — F41.9 ANXIETY: ICD-10-CM

## 2020-02-03 DIAGNOSIS — E78.5 DYSLIPIDEMIA: ICD-10-CM

## 2020-02-03 RX ORDER — HYDROXYZINE 25 MG/1
TABLET, FILM COATED ORAL
Qty: 30 TAB | Refills: 0 | Status: SHIPPED | OUTPATIENT
Start: 2020-02-03 | End: 2020-02-05

## 2020-02-03 NOTE — TELEPHONE ENCOUNTER
Called patient at this time. No answer noted. LVM informing patient to try OTC Mucinex if OTC medication has been tried I informed patient to call the office for first available appointment

## 2020-02-05 ENCOUNTER — OFFICE VISIT (OUTPATIENT)
Dept: FAMILY MEDICINE CLINIC | Age: 65
End: 2020-02-05

## 2020-02-05 ENCOUNTER — HOSPITAL ENCOUNTER (OUTPATIENT)
Dept: LAB | Age: 65
Discharge: HOME | End: 2020-02-05
Payer: MEDICARE

## 2020-02-05 VITALS
RESPIRATION RATE: 18 BRPM | WEIGHT: 169 LBS | OXYGEN SATURATION: 95 % | DIASTOLIC BLOOD PRESSURE: 78 MMHG | TEMPERATURE: 95.7 F | HEIGHT: 62 IN | HEART RATE: 98 BPM | BODY MASS INDEX: 31.1 KG/M2 | SYSTOLIC BLOOD PRESSURE: 134 MMHG

## 2020-02-05 DIAGNOSIS — Z71.89 ACP (ADVANCE CARE PLANNING): ICD-10-CM

## 2020-02-05 DIAGNOSIS — K21.9 GASTROESOPHAGEAL REFLUX DISEASE, ESOPHAGITIS PRESENCE NOT SPECIFIED: ICD-10-CM

## 2020-02-05 DIAGNOSIS — I10 ESSENTIAL HYPERTENSION: Primary | ICD-10-CM

## 2020-02-05 DIAGNOSIS — Z00.00 ENCOUNTER FOR MEDICARE ANNUAL WELLNESS EXAM: ICD-10-CM

## 2020-02-05 DIAGNOSIS — F41.9 ANXIETY: ICD-10-CM

## 2020-02-05 DIAGNOSIS — I10 ESSENTIAL HYPERTENSION: ICD-10-CM

## 2020-02-05 DIAGNOSIS — R00.0 TACHYCARDIA: ICD-10-CM

## 2020-02-05 DIAGNOSIS — J44.1 CHRONIC OBSTRUCTIVE PULMONARY DISEASE WITH ACUTE EXACERBATION (HCC): ICD-10-CM

## 2020-02-05 DIAGNOSIS — R73.9 HYPERGLYCEMIA: ICD-10-CM

## 2020-02-05 LAB
CHOLEST SERPL-MCNC: 158 MG/DL
EST. AVERAGE GLUCOSE BLD GHB EST-MCNC: 126 MG/DL
HBA1C MFR BLD: 6 % (ref 4.2–5.6)
HDLC SERPL-MCNC: 40 MG/DL (ref 40–60)
HDLC SERPL: 4 {RATIO} (ref 0–5)
LDLC SERPL CALC-MCNC: 98.2 MG/DL (ref 0–100)
LIPID PROFILE,FLP: NORMAL
TRIGL SERPL-MCNC: 99 MG/DL (ref ?–150)
VLDLC SERPL CALC-MCNC: 19.8 MG/DL

## 2020-02-05 PROCEDURE — 80061 LIPID PANEL: CPT

## 2020-02-05 PROCEDURE — 83036 HEMOGLOBIN GLYCOSYLATED A1C: CPT

## 2020-02-05 RX ORDER — ATORVASTATIN CALCIUM 10 MG/1
TABLET, FILM COATED ORAL
Qty: 30 TAB | Refills: 0 | Status: SHIPPED | OUTPATIENT
Start: 2020-02-05 | End: 2020-03-31

## 2020-02-05 RX ORDER — DOXYCYCLINE 100 MG/1
100 TABLET ORAL 2 TIMES DAILY
Qty: 20 TAB | Refills: 0 | Status: SHIPPED | OUTPATIENT
Start: 2020-02-05 | End: 2020-02-15

## 2020-02-05 NOTE — PROGRESS NOTES
Chief Complaint   Patient presents with    Annual Wellness Visit    Cold Symptoms     coughing, nasal congestion      1. Have you been to the ER, urgent care clinic since your last visit? Hospitalized since your last visit? No    2. Have you seen or consulted any other health care providers outside of the [address] [place] [address] since your last visit? Include any pap smears or colon screening. No  This is the Subsequent Medicare Annual Wellness Exam, performed 12 months or more after the Initial AWV or the last Subsequent AWV    I have reviewed the patient's medical history in detail and updated the computerized patient record.      History   Monica Rivero comes in for Medicare wellness exam.    Patient Active Problem List   Diagnosis Code    Vomiting R11.10    Hemorrhoids K64.9    Back pain M54.9    GERD (gastroesophageal reflux disease) K21.9    H/O thyroid nodule Z86.39    Renal cyst, left N28.1    Tobacco abuse Z72.0    Hypovitaminosis D E55.9    Dyslipidemia E78.5     Past Medical History:   Diagnosis Date    Acid reflux     Anemia     COPD (chronic obstructive pulmonary disease) (HCC)     Cystic disease of kidney     GERD (gastroesophageal reflux disease)     Hemorrhoid     History of blood transfusion     HTN (hypertension)     Hyperlipidemia     Hypertension     Renal cyst     Thyroid disease       Past Surgical History:   Procedure Laterality Date    HX COLONOSCOPY      HX ENDOSCOPY      HX HEENT  2011    Partial thyroid    HX HYSTERECTOMY      HX ORTHOPAEDIC  2005    left shoulder sugery    HX PARTIAL HYSTERECTOMY  1990    HX THYROIDECTOMY       Current Outpatient Medications   Medication Sig Dispense Refill    hydrOXYzine HCl (ATARAX) 25 mg tablet TAKE 1 TABLET BY MOUTH THREE TIMES DAILY AS NEEDED FOR ANXIETY FOR  UP  TO  10  DAYS 30 Tab 0    benzonatate (TESSALON) 200 mg capsule TAKE 1 CAPSULE BY MOUTH THREE TIMES DAILY AS NEEDED FOR COUGH 30 Cap 2    hydroCHLOROthiazide (HYDRODIURIL) 12.5 mg tablet TAKE 1 TABLET BY MOUTH ONCE DAILY 30 Tab 2    VITAMIN D2 50,000 unit capsule TAKE 1 CAPSULE BY MOUTH ONCE A WEEK (EVERY  7  DAYS) 13 Cap 1    PARoxetine (PAXIL) 10 mg tablet Take 1 Tab by mouth daily. 30 Tab 3    atorvastatin (LIPITOR) 10 mg tablet Take 1 Tab by mouth daily. 30 Tab 2    fluticasone propionate (FLONASE) 50 mcg/actuation nasal spray USE 2 SPRAY(S) IN EACH NOSTRIL ONCE DAILY 1 Bottle 1    amLODIPine (NORVASC) 10 mg tablet Take 1 Tab by mouth daily. 30 Tab 2    umeclidinium-vilanterol (ANORO ELLIPTA) 62.5-25 mcg/actuation inhaler Take 1 Puff by inhalation daily.  aspirin delayed-release 81 mg tablet 81 mg.      albuterol (PROVENTIL HFA, VENTOLIN HFA, PROAIR HFA) 90 mcg/actuation inhaler Take  by inhalation.  omeprazole (PRILOSEC) 20 mg capsule Take 20 mg by mouth daily.       hydroxyzine HCL (ATARAX) 25 mg tablet TAKE 1 TABLET BY MOUTH THREE TIMES DAILY AS NEEDED FOR ANXIETY FOR  UP  TO  10  DAYS 30 Tab 0     Allergies   Allergen Reactions    Latex, Natural Rubber Rash       Family History   Problem Relation Age of Onset    Diabetes Mother     Hypertension Mother     Cancer Father     Hypertension Father      Social History     Tobacco Use    Smoking status: Former Smoker     Packs/day: 0.30     Years: 27.00     Pack years: 8.10     Types: Cigarettes    Smokeless tobacco: Never Used   Substance Use Topics    Alcohol use: No     Alcohol/week: 0.0 standard drinks       Depression Risk Factor Screening:     3 most recent PHQ Screens 2/5/2020   Little interest or pleasure in doing things Not at all   Feeling down, depressed, irritable, or hopeless Not at all   Total Score PHQ 2 0       Alcohol Risk Factor Screening:   Do you average 1 drink per night or more than 7 drinks a week:  No    On any one occasion in the past three months have you have had more than 3 drinks containing alcohol:  No      Functional Ability and Level of Safety: Hearing: Hearing is good. Activities of Daily Living: The home contains: no safety equipment. Patient does total self care    Ambulation: with no difficulty    Fall Risk:  Fall Risk Assessment, last 12 mths 2/5/2020   Able to walk? Yes   Fall in past 12 months? No       Abuse Screen:  Patient is not abused    Cognitive Screening   Has your family/caregiver stated any concerns about your memory: no  Cognitive Screening: Normal - Verbal Fluency Test    Patient Care Team   Patient Care Team:  Gisel Gabriel MD as PCP - General (Family Practice)  Gisel Gabriel MD as PCP - [facility] Empaneled Provider  Maggie Reis MD (Family Practice)  Sukhiwnder Hernandez MD (Surgery)  Sara Perry PA-C (Physician Assistant)  Chaparrita Stone MD (Ophthalmology)  Tadeo Raymond MD (Pulmonary Disease)  Manuel Crawford MD (Gastroenterology)    Assessment/Plan   Education and counseling provided:  Are appropriate based on today's review and evaluation  Diabetes screening test    1. Encounter for Medicare annual wellness exam    2. ACP (advance care planning)    Health Maintenance Due   Topic Date Due    DTaP/Tdap/Td series (1 - Tdap) 01/27/1965    Diabetes Screen (Age 38-68, no DM, BMI>25, & no RBS <100 in 3Y) (q3y)  01/27/1994    Shingrix Vaccine Age 50> (1 of 2) 01/27/2004    GLAUCOMA SCREENING Q2Y  01/27/2019     I have discussed the diagnosis with the patient and the intended plan of care as seen in the above orders. The patient has received an after-visit summary and questions were answered concerning future plans. I have discussed medication, side effects, and warnings with the patient in detail. The patient verbalized understanding and is in agreement with the plan of care. The patient will contact the office with any additional concerns. Personalized preventative plan of care was discussed, printed and given to patient.     Tashia Soares MD

## 2020-02-05 NOTE — PROGRESS NOTES
HPI  Lawanda Delaney comes in for f/u care. Cough: Patient has cough that has been ongoing for a week. Cough is productive of mucopurulent phlegm. Associated chest pain that is pleuritic. She has occasional fever, chills and malaise. She also has noted wheezing on and off. She would like to have a chest x-ray done. She is mucopurulent bronchitis. I will put on doxycycline 100 mg twice a day. I will have her get a chest x-ray done. We will follow-up with the results. COPD: Patient has a history of COPD. She is on inhaler medications. Has been using these more lately. She does have an occasional wheeze. We will have her intensify the use of her inhaler medications. HTN: Patient has hypertension. Blood pressure today stable. She denies headache, changes in vision or focal weakness. She takes HCTZ and amlodipine. We will continue with these medications. GERD: Patient has gastroesophageal reflux disease. She gets heartburn on and off. She is on Prilosec. We will continue with this medication. Anxiety: Patient has a history of anxiety. She is on hydroxyzine. She also takes Paxil. The Paxil also helps with postmenopausal symptoms. Continue current treatment plan. Tachycardia: Patient states that occasionally her heart rate goes up to 104. We discussed tachycardia especially sinus tachycardia. Currently it is at 80. She denies syncope, palpitations, shortness of breath or diaphoresis. She has been followed up with a cardiologist.  In the past we had put on a beta-blocker but she did not tolerate this. We will continue with the current management plan.     Past Medical History  Past Medical History:   Diagnosis Date    Acid reflux     Anemia     COPD (chronic obstructive pulmonary disease) (HCC)     Cystic disease of kidney     GERD (gastroesophageal reflux disease)     Hemorrhoid     History of blood transfusion     HTN (hypertension)     Hyperlipidemia     Hypertension     Renal Interval Events:  Marked decline in status.      REVIEW OF SYSTEMS:  Unable to access      OBJECTIVE:  ICU Vital Signs Last 24 Hrs  T(C): 37.6 (18 May 2018 05:10), Max: 38.4 (18 May 2018 01:15)  T(F): 99.7 (18 May 2018 05:10), Max: 101.2 (18 May 2018 01:15)  HR: 127 (18 May 2018 10:19) (109 - 132)  BP: 105/53 (18 May 2018 10:19) (81/41 - 142/89)  RR: 18 (18 May 2018 05:10) (18 - 22)  SpO2: 100% (18 May 2018 10:19) (94% - 100%)        05-17 @ 07:01  -  05-18 @ 07:00  --------------------------------------------------------  IN: 0 mL / OUT: 75 mL / NET: -75 mL      PHYSICAL EXAM:  General: Minimally responsive  Lymph Nodes: No palpable lymphadenopathy  Neck: No JVD. No carotid bruit.   Respiratory: tachypneic                         decreased L breath sounds                         ++, rhonchi ++r rales.  Cardiovascular: S1 S2 normal. No murmurs, rubs or gallops.   Abdomen: Soft, non-tender, non-distended. No organomegaly.  Extremities: Warm to touch. Peripheral pulse palpable. No pedal edema.   Skin: No rashes or skin lesions      HOSPITAL MEDICATIONS:  MEDICATIONS  (STANDING):  ALBUTerol/ipratropium for Nebulization 3 milliLiter(s) Nebulizer every 6 hours  carBAMazepine 200 milliGRAM(s) Oral two times a day  cefepime   IVPB 1000 milliGRAM(s) IV Intermittent every 12 hours  dextrose 5% + sodium chloride 0.45%. 1000 milliLiter(s) (40 mL/Hr) IV Continuous <Continuous>  heparin  Injectable 5000 Unit(s) SubCutaneous every 12 hours  levETIRAcetam  IVPB 375 milliGRAM(s) IV Intermittent every 12 hours    MEDICATIONS  (PRN):  acetaminophen   Tablet. 650 milliGRAM(s) Oral every 6 hours PRN Moderate Pain (4 - 6)  acetaminophen  Suppository. 650 milliGRAM(s) Rectal every 8 hours PRN Mild Pain (1 - 3)  metoprolol tartrate Injectable 5 milliGRAM(s) IV Push every 6 hours PRN elevated HR  morphine  - Injectable 2 milliGRAM(s) IV Push every 3 hours PRN Severe Pain (7 - 10)  ondansetron Injectable 4 milliGRAM(s) IV Push every 6 hours PRN Nausea and/or Vomiting cyst     Thyroid disease        Surgical History  Past Surgical History:   Procedure Laterality Date    HX COLONOSCOPY      HX ENDOSCOPY      HX HEENT  2011    Partial thyroid    HX HYSTERECTOMY      HX ORTHOPAEDIC  2005    left shoulder sugery    HX PARTIAL HYSTERECTOMY  1990    HX THYROIDECTOMY          Medications  Current Outpatient Medications   Medication Sig Dispense Refill    hydrOXYzine HCl (ATARAX) 25 mg tablet TAKE 1 TABLET BY MOUTH THREE TIMES DAILY AS NEEDED FOR ANXIETY FOR  UP  TO  10  DAYS 30 Tab 0    benzonatate (TESSALON) 200 mg capsule TAKE 1 CAPSULE BY MOUTH THREE TIMES DAILY AS NEEDED FOR COUGH 30 Cap 2    hydroCHLOROthiazide (HYDRODIURIL) 12.5 mg tablet TAKE 1 TABLET BY MOUTH ONCE DAILY 30 Tab 2    VITAMIN D2 50,000 unit capsule TAKE 1 CAPSULE BY MOUTH ONCE A WEEK (EVERY  7  DAYS) 13 Cap 1    PARoxetine (PAXIL) 10 mg tablet Take 1 Tab by mouth daily. 30 Tab 3    atorvastatin (LIPITOR) 10 mg tablet Take 1 Tab by mouth daily. 30 Tab 2    fluticasone propionate (FLONASE) 50 mcg/actuation nasal spray USE 2 SPRAY(S) IN EACH NOSTRIL ONCE DAILY 1 Bottle 1    amLODIPine (NORVASC) 10 mg tablet Take 1 Tab by mouth daily. 30 Tab 2    umeclidinium-vilanterol (ANORO ELLIPTA) 62.5-25 mcg/actuation inhaler Take 1 Puff by inhalation daily.  aspirin delayed-release 81 mg tablet 81 mg.      albuterol (PROVENTIL HFA, VENTOLIN HFA, PROAIR HFA) 90 mcg/actuation inhaler Take  by inhalation.  omeprazole (PRILOSEC) 20 mg capsule Take 20 mg by mouth daily.       hydroxyzine HCL (ATARAX) 25 mg tablet TAKE 1 TABLET BY MOUTH THREE TIMES DAILY AS NEEDED FOR ANXIETY FOR  UP  TO  10  DAYS 30 Tab 0     Allergies  Allergies   Allergen Reactions    Latex, Natural Rubber Rash     Family History  Family History   Problem Relation Age of Onset    Diabetes Mother     Hypertension Mother     Cancer Father     Hypertension Father        Social History  Social History     Socioeconomic History    Marital status: SINGLE     Spouse name: Not on file    Number of children: Not on file    Years of education: Not on file    Highest education level: Not on file   Occupational History    Not on file   Social Needs    Financial resource strain: Not on file    Food insecurity:     Worry: Not on file     Inability: Not on file    Transportation needs:     Medical: Not on file     Non-medical: Not on file   Tobacco Use    Smoking status: Former Smoker     Packs/day: 0.30     Years: 27.00     Pack years: 8.10     Types: Cigarettes    Smokeless tobacco: Never Used   Substance and Sexual Activity    Alcohol use: No     Alcohol/week: 0.0 standard drinks    Drug use: No    Sexual activity: Never   Lifestyle    Physical activity:     Days per week: Not on file     Minutes per session: Not on file    Stress: Not on file   Relationships    Social connections:     Talks on phone: Not on file     Gets together: Not on file     Attends [employer] service: Not on file     Active member of club or organization: Not on file     Attends meetings of clubs or organizations: Not on file     Relationship status: Not on file    Intimate partner violence:     Fear of current or ex partner: Not on file     Emotionally abused: Not on file     Physically abused: Not on file     Forced sexual activity: Not on file   Other Topics Concern    Not on file   Social History Narrative    ** Merged History Encounter **          Review of Systems  Review of Systems - Review of all systems is negative except as noted above in the HPI.     Vital Signs  Visit Vitals  /78 (BP 1 Location: Left arm, BP Patient Position: Sitting)   Pulse 98   Temp 95.7 °F (35.4 °C) (Oral)   Resp 18   Ht 5' 2\" (1.575 m)   Wt 169 lb (76.7 kg)   LMP 05/08/1981   SpO2 95%   BMI 30.91 kg/m²     Physical Exam  Physical Examination: General appearance - oriented to person, place, and time and acyanotic, in no respiratory distress  Mental status - affect appropriate to mood  Neck - supple, no significant adenopathy  Lymphatics - no palpable lymphadenopathy  Chest - no tachypnea, retractions or cyanosis  Heart - S1 and S2 normal  Abdomen - no rebound tenderness noted  Neurological - motor and sensory grossly normal bilaterally  Musculoskeletal - full range of motion without pain  Extremities - no pedal edema noted, intact peripheral pulses    Results  Results for orders placed or performed in visit on 12/27/19   AMB POC RAPID STREP A   Result Value Ref Range    VALID INTERNAL CONTROL POC Yes     Group A Strep Ag Positive Negative     ASSESSMENT and PLAN    ICD-10-CM ICD-9-CM    1. Essential hypertension I10 401.9 LIPID PANEL      COLLECTION VENOUS BLOOD,VENIPUNCTURE   2. Chronic obstructive pulmonary disease with acute exacerbation (HCC) J44.1 491.21 doxycycline (ADOXA) 100 mg tablet      COLLECTION VENOUS BLOOD,VENIPUNCTURE   3. Anxiety F41.9 300.00    4. Tachycardia R00.0 785.0    5. Hyperglycemia R73.9 790.29 HEMOGLOBIN A1C WITH EAG   6. Gastroesophageal reflux disease, esophagitis presence not specified K21.9 530.81    7. Encounter for Medicare annual wellness exam Z00.00 V70.0    8. ACP (advance care planning) Z71.89 V65.49      lab results and schedule of future lab studies reviewed with patient  reviewed diet, exercise and weight control  cardiovascular risk and specific lipid/LDL goals reviewed  reviewed medications and side effects in detail    I have discussed the diagnosis with the patient and the intended plan of care as seen in the above orders. The patient has received an after-visit summary and questions were answered concerning future plans. I have discussed medication, side effects, and warnings with the patient in detail. The patient verbalized understanding and is in agreement with the plan of care. The patient will contact the office with any additional concerns.     Ella Mayberry MD    PLEASE NOTE:   This document has been produced using voice recognition software.  Unrecognized errors in transcription may be present

## 2020-02-05 NOTE — ACP (ADVANCE CARE PLANNING)
Advance Care Planning (ACP) Provider Conversation Snapshot    Date of ACP Conversation: 02/05/20  Persons included in Conversation:  patient  Length of ACP Conversation in minutes:  <16 minutes (Non-Billable)    Authorized Decision Maker (if patient is incapable of making informed decisions): This person is:   son. For Patients with Decision Making Capacity:   Values/Goals: Exploration of values, goals, and preferences if recovery is not expected, even with continued medical treatment in the event of:  Imminent death  Severe, permanent brain injury  \"In these circumstances, what matters most to you? \"  Care focused more on comfort or quality of life.     Conversation Outcomes / Follow-Up Plan:   Recommended communicating the plan and making copies for the healthcare agent, personal physician, and others as appropriate (e.g., health system)     Gaurang Santana MD

## 2020-02-05 NOTE — PROGRESS NOTES
Venipuncture to patient left forearm at this time. Patient tolerated well at this time. No other concerns noted at this time. Labs ordered by PCP

## 2020-02-05 NOTE — PATIENT INSTRUCTIONS
Medicare Part B Preventive Services Limitations Recommendation Scheduled Bone Mass Measurement 
(age 72 & older, biennial) A bone mass density test is recommended when a woman turns 65 to screen for osteoporosis. This test is only recommended one time, as a screening. Some providers will use this same test as a disease monitoring tool if you already have osteoporosis. utd Cardiovascular Screening Blood Tests (every 5 years) Total cholesterol, HDL, Triglycerides and ECG Order blood work  as a panel if possible and adults with routine risk  an electrocardiogram (ECG) at intervals determined by your doctor. Due    
Colorectal Cancer Screening 
-Fecal occult blood test (annual) -Flexible sigmoidoscopy (5y) 
-Screening colonoscopy (10y) -Barium Enema Colorectal cancer screenings should be done for adults age 54-65 with no increased risk factors for colorectal cancer. There are a number of acceptable methods of screening for this type of cancer. Each test has its own benefits and drawbacks. Discuss with your doctor what is most appropriate for you during your annual wellness visit. The different tests include: colonoscopy (considered the best screening method), a fecal occult blood test, a fecal DNA test, and sigmoidoscopy. Due    
Counseling to Prevent Tobacco Use (up to 8 sessions per year) - Counseling greater than 3 and up to 10 minutes - Counseling greater than 10 minutes Patients must be asymptomatic of tobacco-related conditions to receive as preventive service N/a Diabetes Screening Tests (at least every 3 years, Medicare covers annually or at 6-month intervals for prediabetic patients) Fasting blood sugar (FBS) or glucose tolerance test (GTT) -All adults age 38-68 who are overweight should have a diabetes screening test once every three years.  
-Other screening tests and preventive services for persons with diabetes include: an eye exam to screen for diabetic retinopathy, a kidney function test, a foot exam, and stricter control over your cholesterol. N/a Diabetes Self-Management Training (DSMT) (no USPSTF recommendation) Requires referral by treating physician for patient with diabetes or renal disease. 10 hours of initial DSMT session of no less than 30 minutes each in a continuous 12-month period. 2 hours of follow-up DSMT in subsequent years. N/a Glaucoma Screening (no USPSTF recommendation) Diabetes mellitus, family history, , age 48 or over,  American, age 72 or over Due Human Immunodeficiency Virus (HIV) Screening (annually for increased risk patients) HIV-1 and HIV-2 by EIA, NATALY, rapid antibody test, or oral mucosa transudate Patient must be at increased risk for HIV infection per USPSTF guidelines or pregnant. Tests covered annually for patients at increased risk. Pregnant patients may receive up to 3 test during pregnancy. N/a Medical Nutrition Therapy (MNT) (for diabetes or renal disease not recommended schedule) Requires referral by treating physician for patient with diabetes or renal disease. Can be provided in same year as diabetes self-management training (DSMT), and CMS recommends medical nutrition therapy take place after DSMT. Up to 3 hours for initial year and 2 hours in subsequent years. N/a Shingles Vaccination A shingles vaccine is also recommended once in a lifetime after age 61 utd Seasonal Influenza Vaccination (annually) All adults should have a flu vaccine yearly  utd Pneumococcal Vaccination (once after 72) All adults over the age of 72 should receive the recommended pneumonia vaccines. Current USPSTF guidelines recommend a series of two vaccines for the best pneumonia protection. Hepatitis B Vaccinations (if medium/high risk) Medium/high risk factors:  End-stage renal disease, Hemophiliacs who received Factor VIII or IX concentrates, Clients of institutions for the mentally retarded, Persons who live in the same house as a HepB virus carrier, Homosexual men, Illicit injectable drug abusers. N/a Screening Mammography (biennial age 54-69) Breast cancer screenings are recommended every other year for women of normal risk, age 54-69. utd Screening Pap Tests and Pelvic Examination (up to age 79 and after 79 if unknown history or abnormal study last 10 years) Cervical cancer screenings for women over age 72 are only recommended with certain risk factors N/a Hepatitis C All adults born between [facility] should be screened once  utd Tetanus  All adults should have a tetanus vaccine every 10 years Due

## 2020-02-09 DIAGNOSIS — R93.89 ABNORMAL CHEST X-RAY: Primary | ICD-10-CM

## 2020-02-09 DIAGNOSIS — R05.3 CHRONIC COUGH: ICD-10-CM

## 2020-02-10 NOTE — PROGRESS NOTES
Please let patient know her HbA1c 6.0. This is in the prediabetes range. She should intensify lifestyle and dietary modification. Recheck in 3 to 6 months.   Christiane Suarez MD

## 2020-02-12 NOTE — PROGRESS NOTES
Spoke with patient at this time. Informed patient of results at this time.  Patient verbalized understanding

## 2020-02-17 DIAGNOSIS — F41.9 ANXIETY: ICD-10-CM

## 2020-02-17 RX ORDER — HYDROXYZINE 25 MG/1
TABLET, FILM COATED ORAL
Qty: 30 TAB | Refills: 0 | Status: SHIPPED | OUTPATIENT
Start: 2020-02-17 | End: 2020-02-19

## 2020-02-18 ENCOUNTER — TELEPHONE (OUTPATIENT)
Dept: FAMILY MEDICINE CLINIC | Age: 65
End: 2020-02-18

## 2020-02-18 RX ORDER — AMLODIPINE BESYLATE 10 MG/1
TABLET ORAL
Qty: 30 TAB | Refills: 0 | Status: SHIPPED | OUTPATIENT
Start: 2020-02-18 | End: 2020-02-19

## 2020-02-18 NOTE — TELEPHONE ENCOUNTER
Patient called requesting her CT orders be faxed to [facility]. CT of neck and chest, as well as office note faxed at this time.

## 2020-02-19 DIAGNOSIS — F41.9 ANXIETY: ICD-10-CM

## 2020-02-19 RX ORDER — HYDROXYZINE 25 MG/1
TABLET, FILM COATED ORAL
Qty: 30 TAB | Refills: 0 | Status: SHIPPED | OUTPATIENT
Start: 2020-02-19 | End: 2020-03-16

## 2020-02-19 RX ORDER — AMLODIPINE BESYLATE 10 MG/1
TABLET ORAL
Qty: 30 TAB | Refills: 0 | Status: SHIPPED | OUTPATIENT
Start: 2020-02-19 | End: 2020-03-31

## 2020-02-29 DIAGNOSIS — E04.2 MULTIPLE THYROID NODULES: Primary | ICD-10-CM

## 2020-03-02 ENCOUNTER — TELEPHONE (OUTPATIENT)
Dept: FAMILY MEDICINE CLINIC | Age: 65
End: 2020-03-02

## 2020-03-02 DIAGNOSIS — I10 ESSENTIAL HYPERTENSION: ICD-10-CM

## 2020-03-02 NOTE — TELEPHONE ENCOUNTER
Patient called requesting to have order for Ultrasound sent to [facility]. Order and office note faxed to [facility] at this time.

## 2020-03-09 RX ORDER — HYDROCHLOROTHIAZIDE 12.5 MG/1
TABLET ORAL
Qty: 30 TAB | Refills: 0 | Status: SHIPPED | OUTPATIENT
Start: 2020-03-09 | End: 2020-04-01 | Stop reason: SDUPTHER

## 2020-03-11 ENCOUNTER — TELEPHONE (OUTPATIENT)
Dept: FAMILY MEDICINE CLINIC | Age: 65
End: 2020-03-11

## 2020-03-11 NOTE — TELEPHONE ENCOUNTER
Patient stated the pharmacy is saying they did not get the prescription that Dr Thomas Mcpherson sent in for her bp medication.  Please be advise and re send the prescription

## 2020-03-12 DIAGNOSIS — R05.9 COUGH: ICD-10-CM

## 2020-03-12 DIAGNOSIS — F41.9 ANXIETY: ICD-10-CM

## 2020-03-12 NOTE — TELEPHONE ENCOUNTER
Faxed document from [id number] was faxed to the office today for Refill Authorization Request for HYDROCHLOROT 12.5MG TAB  QTY: 30  TAKE 1 TABLET BY MOUTH ONCE DAILY      MAY NEED TO DO A \"OVER THE PHONE\" AUTHORIZATION FOR THIS REQUEST

## 2020-03-16 RX ORDER — BENZONATATE 200 MG/1
CAPSULE ORAL
Qty: 30 CAP | Refills: 2 | Status: SHIPPED | OUTPATIENT
Start: 2020-03-16 | End: 2020-05-06

## 2020-03-16 RX ORDER — HYDROXYZINE 25 MG/1
TABLET, FILM COATED ORAL
Qty: 30 TAB | Refills: 0 | Status: SHIPPED | OUTPATIENT
Start: 2020-03-16 | End: 2020-04-21

## 2020-03-23 DIAGNOSIS — R93.89 ABNORMAL CHEST X-RAY: ICD-10-CM

## 2020-03-23 DIAGNOSIS — R05.3 CHRONIC COUGH: ICD-10-CM

## 2020-04-01 DIAGNOSIS — I10 ESSENTIAL HYPERTENSION: ICD-10-CM

## 2020-04-01 DIAGNOSIS — N95.1 MENOPAUSAL AND FEMALE CLIMACTERIC STATES: ICD-10-CM

## 2020-04-01 DIAGNOSIS — E78.5 DYSLIPIDEMIA: ICD-10-CM

## 2020-04-01 RX ORDER — ATORVASTATIN CALCIUM 10 MG/1
TABLET, FILM COATED ORAL
Qty: 90 TAB | Refills: 1 | Status: SHIPPED | OUTPATIENT
Start: 2020-04-01 | End: 2020-12-31 | Stop reason: SDUPTHER

## 2020-04-01 RX ORDER — HYDROCHLOROTHIAZIDE 12.5 MG/1
TABLET ORAL
Qty: 90 TAB | Refills: 0 | Status: SHIPPED | OUTPATIENT
Start: 2020-04-01 | End: 2020-08-12

## 2020-04-01 RX ORDER — HYDROCHLOROTHIAZIDE 12.5 MG/1
TABLET ORAL
Qty: 30 TAB | Refills: 0 | OUTPATIENT
Start: 2020-04-01

## 2020-04-01 RX ORDER — PAROXETINE 10 MG/1
TABLET, FILM COATED ORAL
Qty: 90 TAB | Refills: 0 | Status: SHIPPED | OUTPATIENT
Start: 2020-04-01 | End: 2020-07-27

## 2020-04-01 NOTE — TELEPHONE ENCOUNTER
Requested Prescriptions     Pending Prescriptions Disp Refills    atorvastatin (LIPITOR) 10 mg tablet 90 Tab 1    hydroCHLOROthiazide (HYDRODIURIL) 12.5 mg tablet 30 Tab 0

## 2020-04-21 DIAGNOSIS — F41.9 ANXIETY: ICD-10-CM

## 2020-04-21 RX ORDER — HYDROXYZINE 25 MG/1
TABLET, FILM COATED ORAL
Qty: 30 TAB | Refills: 0 | Status: SHIPPED | OUTPATIENT
Start: 2020-04-21 | End: 2020-05-06 | Stop reason: SDUPTHER

## 2020-05-04 RX ORDER — FLUTICASONE PROPIONATE 50 MCG
SPRAY, SUSPENSION (ML) NASAL
Qty: 16 G | Refills: 0 | Status: SHIPPED | OUTPATIENT
Start: 2020-05-04 | End: 2020-06-01

## 2020-05-06 ENCOUNTER — VIRTUAL VISIT (OUTPATIENT)
Dept: FAMILY MEDICINE CLINIC | Age: 65
End: 2020-05-06

## 2020-05-06 DIAGNOSIS — J44.1 CHRONIC OBSTRUCTIVE PULMONARY DISEASE WITH ACUTE EXACERBATION (HCC): ICD-10-CM

## 2020-05-06 DIAGNOSIS — E55.9 HYPOVITAMINOSIS D: ICD-10-CM

## 2020-05-06 DIAGNOSIS — F32.A DEPRESSION, UNSPECIFIED DEPRESSION TYPE: ICD-10-CM

## 2020-05-06 DIAGNOSIS — E78.5 DYSLIPIDEMIA: ICD-10-CM

## 2020-05-06 DIAGNOSIS — I10 ESSENTIAL HYPERTENSION: ICD-10-CM

## 2020-05-06 DIAGNOSIS — F41.9 ANXIETY: Primary | ICD-10-CM

## 2020-05-06 DIAGNOSIS — K21.9 GASTROESOPHAGEAL REFLUX DISEASE, ESOPHAGITIS PRESENCE NOT SPECIFIED: ICD-10-CM

## 2020-05-06 RX ORDER — HYDROXYZINE 25 MG/1
TABLET, FILM COATED ORAL
Qty: 60 TAB | Refills: 1 | Status: SHIPPED | OUTPATIENT
Start: 2020-05-06 | End: 2020-06-29

## 2020-05-06 NOTE — PROGRESS NOTES
Michelle Dorado is a 76 y.o. female who was seen by synchronous (real-time) audio-video technology on 2020. Consent: Michelle Dorado, who was seen by synchronous (real-time) audio-video technology, and/or her healthcare decision maker, is aware that this patient-initiated, Telehealth encounter on 2020 is a billable service, with coverage as determined by her insurance carrier. She is aware that she may receive a bill and has provided verbal consent to proceed: Yes. Assessment & Plan:       ICD-10-CM ICD-9-CM    1. Anxiety F41.9 300.00 hydrOXYzine HCL (ATARAX) 25 mg tablet   2. Depression, unspecified depression type F32.9 311    3. Essential hypertension I10 401.9    4. Gastroesophageal reflux disease, esophagitis presence not specified K21.9 530.81    5. Chronic obstructive pulmonary disease with acute exacerbation (HCC) J44.1 491.21    6. Dyslipidemia E78.5 272.4    7. Hypovitaminosis D E55.9 268.9      I spent at least 40 minutes on this visit with this established patient. ([id number])    Subjective:   Michelle Dorado is a 76 y.o. female who was seen for Anxiety; Depression; and Hypertension  Patient is seen for follow-up care. Patient has anxiety and depression. Most recently her son  of a brain aneurysm. Her daughter-in-law and her parents also  of the COVID-23 virus. This was within short time span. Patient has been having episodes of anxiety mainly. She feels tired and worn out without any energy. She also has no motivation to do much. She is on Paxil and this has been helping with depression. She also prays a lot and has family support system. She takes care of her mom and brother and this has helped in taking her mind off things. She however continues to have this anxiety and restlessness. At night she is unable to sleep well due to the anxiety. She has used hydroxyzine as needed and this has helped with symptoms. She would like a refill of the same.   She did get a refill of the Paxil and will continue with this. I will send in a refill of medication and follow-up at next visit or sooner as needed. Patient has hypertension. Blood pressure has been stable. She is on HCTZ and amlodipine. Blood pressure has been stable. She denies headache, changes in vision or focal weakness. We will continue with the current management plan. Patient has dyslipidemia. She is on Lipitor. Denies muscle aches. She is able to tolerate the medication. We will continue with the current treatment plan. Recheck labs at next visit. Patient has a history of gastroesophageal reflux disease and takes Prilosec. She has occasional heartburn. No dark stools or hematemesis. We will continue with the medication. She has COPD and has been followed up by the pulmonologist.  She is currently on Anoro Ellipta and albuterol. Recently was on a 7-day course of antibiotics for bronchitis. She feels stable. She has an occasional cough but no fever or chills. She will continue with management as per the pulmonologist recommendations. Patient has hypovitaminosis D. She is on vitamin D replacement therapy. We will recheck labs at her next visit    Prior to Admission medications    Medication Sig Start Date End Date Taking?  Authorizing Provider   hydrOXYzine HCL (ATARAX) 25 mg tablet TAKE 1 TABLET BY MOUTH THREE TIMES DAILY AS NEEDED FOR ANXIETY 5/6/20  Yes Robbie Mclaughlin MD   fluticasone propionate (FLONASE) 50 mcg/actuation nasal spray Use 2 spray(s) in each nostril once daily 5/4/20   Robbie Echavarria MD   hydrOXYzine HCL (ATARAX) 25 mg tablet TAKE 1 TABLET BY MOUTH THREE TIMES DAILY AS NEEDED FOR ANXIETY FOR  UP  TO  10  DAYS 4/21/20 5/6/20  Robbie Echavarria MD   PARoxetine (PAXIL) 10 mg tablet Take 1 tablet by mouth once daily 4/1/20   Robbie Echavarria MD   atorvastatin (LIPITOR) 10 mg tablet Take 1 tablet by mouth once daily 4/1/20   Robbie Echavarria MD   hydroCHLOROthiazide (HYDRODIURIL) 12.5 mg tablet Take 1 tablet by mouth once daily 4/1/20   Robbie Echavarria MD   amLODIPine (NORVASC) 10 mg tablet Take 1 tablet by mouth once daily 3/31/20   Robbie Echavarria MD   benzonatate (TESSALON) 200 mg capsule TAKE 1 CAPSULE BY MOUTH THREE TIMES DAILY AS NEEDED FOR COUGH 3/16/20   Robbie Reza MD   VITAMIN D2 50,000 unit capsule TAKE 1 CAPSULE BY MOUTH ONCE A WEEK (EVERY  7  DAYS) 11/25/19   Robbie Echavarria MD   umeclidinium-vilanterol (ANORO ELLIPTA) 62.5-25 mcg/actuation inhaler Take 1 Puff by inhalation daily. Provider, Historical   aspirin delayed-release 81 mg tablet 81 mg.    Provider, Historical   albuterol (PROVENTIL HFA, VENTOLIN HFA, PROAIR HFA) 90 mcg/actuation inhaler Take  by inhalation. Provider, Historical   omeprazole (PRILOSEC) 20 mg capsule Take 20 mg by mouth daily. Provider, Historical     Allergies   Allergen Reactions    Latex, Natural Rubber Rash     ROS Review of all systems is negative except as noted above in the HPI. Objective:   Vital Signs: (As obtained by patient/caregiver at home)  Visit Vitals  LMP 05/08/1981      Constitutional: [x] Appears well-developed and well-nourished [x] No apparent distress    Mental status: [x] Alert and awake  [x] Oriented to person/place/time [x] Able to follow commands    Eyes:   EOM    [x]  Normal     HENT: [x] Normocephalic, atraumatic    [x] Mouth/Throat: Mucous membranes are moist    Pulmonary/Chest: [x] Respiratory effort normal   [x] No visualized signs of difficulty breathing or respiratory distress           Neurological:        [x] No Facial Asymmetry (Cranial nerve 7 motor function) (limited exam due to video visit)          [x] No gaze palsy                Psychiatric:       [x] Normal Affect        [x] No Hallucinations    We discussed the expected course, resolution and complications of the diagnosis(es) in detail. Medication risks, benefits, costs, interactions, and alternatives were discussed as indicated.   I advised her to contact the office if her condition worsens, changes or fails to improve as anticipated. She expressed understanding with the diagnosis(es) and plan. Kirstin Dao is a 76 y.o. female who was evaluated by a video visit encounter for concerns as above. Patient identification was verified prior to start of the visit. A caregiver was present when appropriate. Due to this being a TeleHealth encounter (During [address] emergency), evaluation of the following organ systems was limited: Vitals/Constitutional/EENT/Resp/CV/GI//MS/Neuro/Skin/Heme-Lymph-Imm. Pursuant to the emergency declaration under the [facility], 1135 waiver authority and the [employer] and [employer] Act, this Virtual  Visit was conducted, with patient's (and/or legal guardian's) consent, to reduce the patient's risk of exposure to COVID-19 and provide necessary medical care. Services were provided through a video synchronous discussion virtually to substitute for in-person clinic visit. Patient and provider were located at their individual homes.       Pilo Plummer MD

## 2020-05-13 ENCOUNTER — TELEPHONE (OUTPATIENT)
Dept: FAMILY MEDICINE CLINIC | Age: 65
End: 2020-05-13

## 2020-05-13 DIAGNOSIS — M54.50 MIDLINE LOW BACK PAIN, UNSPECIFIED CHRONICITY, UNSPECIFIED WHETHER SCIATICA PRESENT: Primary | ICD-10-CM

## 2020-05-13 NOTE — TELEPHONE ENCOUNTER
Spoke with patient at this time. Patient states she have notice when she take both of her blood pressure medication she become dizzy. Patient states she did not take her medication yesterday and today to see if her dizziness will go away. Patient states she do not feel dizzy today since she did not take her blood pressure medication. Patient is also asking for a referral to chiropractor for back pain. Patient states she use to see one in the past but she would like a different location at this time

## 2020-05-13 NOTE — TELEPHONE ENCOUNTER
Pt stated when she takes her bp pills she feels lightheaded. She did not take the pills yesterday 5/12/20 and she didn't feel lightheaded at all. Ms Isaias Womack is also having back pains and is requesting to see a chiropractor.  Please follow up with pt to discuss this matter when available

## 2020-05-15 DIAGNOSIS — E04.2 MULTIPLE THYROID NODULES: ICD-10-CM

## 2020-06-01 RX ORDER — FLUTICASONE PROPIONATE 50 MCG
SPRAY, SUSPENSION (ML) NASAL
Qty: 16 G | Refills: 0 | Status: SHIPPED | OUTPATIENT
Start: 2020-06-01 | End: 2020-08-10

## 2020-06-29 DIAGNOSIS — E55.9 HYPOVITAMINOSIS D: ICD-10-CM

## 2020-06-29 DIAGNOSIS — F41.9 ANXIETY: ICD-10-CM

## 2020-06-29 RX ORDER — ERGOCALCIFEROL 1.25 MG/1
CAPSULE ORAL
Qty: 13 CAP | Refills: 0 | Status: SHIPPED | OUTPATIENT
Start: 2020-06-29 | End: 2020-10-02

## 2020-06-29 RX ORDER — HYDROXYZINE 25 MG/1
TABLET, FILM COATED ORAL
Qty: 60 TAB | Refills: 0 | Status: SHIPPED | OUTPATIENT
Start: 2020-06-29 | End: 2020-07-20

## 2020-06-30 ENCOUNTER — TELEPHONE (OUTPATIENT)
Dept: FAMILY MEDICINE CLINIC | Age: 65
End: 2020-06-30

## 2020-07-06 ENCOUNTER — OFFICE VISIT (OUTPATIENT)
Dept: FAMILY MEDICINE CLINIC | Age: 65
End: 2020-07-06

## 2020-07-06 VITALS
WEIGHT: 170 LBS | DIASTOLIC BLOOD PRESSURE: 67 MMHG | SYSTOLIC BLOOD PRESSURE: 124 MMHG | HEIGHT: 62 IN | HEART RATE: 94 BPM | OXYGEN SATURATION: 92 % | TEMPERATURE: 99 F | RESPIRATION RATE: 24 BRPM | BODY MASS INDEX: 31.28 KG/M2

## 2020-07-06 DIAGNOSIS — E66.9 OBESITY (BMI 30-39.9): ICD-10-CM

## 2020-07-06 DIAGNOSIS — E55.9 HYPOVITAMINOSIS D: ICD-10-CM

## 2020-07-06 DIAGNOSIS — J44.1 CHRONIC OBSTRUCTIVE PULMONARY DISEASE WITH ACUTE EXACERBATION (HCC): ICD-10-CM

## 2020-07-06 DIAGNOSIS — F32.A DEPRESSION, UNSPECIFIED DEPRESSION TYPE: ICD-10-CM

## 2020-07-06 DIAGNOSIS — E78.5 DYSLIPIDEMIA: ICD-10-CM

## 2020-07-06 DIAGNOSIS — R73.9 HYPERGLYCEMIA: ICD-10-CM

## 2020-07-06 DIAGNOSIS — I10 ESSENTIAL HYPERTENSION: Primary | ICD-10-CM

## 2020-07-06 DIAGNOSIS — E07.9 THYROID DISORDER: ICD-10-CM

## 2020-07-06 DIAGNOSIS — K21.9 GASTROESOPHAGEAL REFLUX DISEASE, ESOPHAGITIS PRESENCE NOT SPECIFIED: ICD-10-CM

## 2020-07-06 NOTE — PROGRESS NOTES
Kj Harness presents today for   Chief Complaint   Patient presents with    Follow-up     mood disorder       Is someone accompanying this pt? no    Is the patient using any DME equipment during OV? no    Depression Screening:  3 most recent PHQ Screens 7/6/2020   Little interest or pleasure in doing things Not at all   Feeling down, depressed, irritable, or hopeless Not at all   Total Score PHQ 2 0   Trouble falling or staying asleep, or sleeping too much More than half the days   Feeling tired or having little energy Not at all   Poor appetite, weight loss, or overeating Not at all   Feeling bad about yourself - or that you are a failure or have let yourself or your family down Not at all   Trouble concentrating on things such as school, work, reading, or watching TV Not at all   Moving or speaking so slowly that other people could have noticed; or the opposite being so fidgety that others notice Not at all   Thoughts of being better off dead, or hurting yourself in some way Not at all   PHQ 9 Score 2   How difficult have these problems made it for you to do your work, take care of your home and get along with others Not difficult at all       Learning Assessment:  Learning Assessment 10/2/2018   PRIMARY LEARNER Patient   HIGHEST LEVEL OF EDUCATION - PRIMARY LEARNER  [facility] PRIMARY LEARNER NONE   PRIMARY LANGUAGE ENGLISH   LEARNER PREFERENCE PRIMARY DEMONSTRATION     -   ANSWERED BY patient   RELATIONSHIP SELF       Abuse Screening:  Abuse Screening Questionnaire 2/5/2020   Do you ever feel afraid of your partner? N   Are you in a relationship with someone who physically or mentally threatens you? N   Is it safe for you to go home? Y       Fall Risk  Fall Risk Assessment, last 12 mths 7/6/2020   Able to walk? Yes   Fall in past 12 months? No       Health Maintenance reviewed and discussed and ordered per Provider.     Health Maintenance Due   Topic Date Due    DTaP/Tdap/Td series (1 - Tdap) 01/27/1975    Shingrix Vaccine Age 50> (1 of 2) 01/27/2004    GLAUCOMA SCREENING Q2Y  01/27/2019    Pneumococcal 65+ years (2 of 2 - PPSV23) 02/28/2020   . Coordination of Care:  1. Have you been to the ER, urgent care clinic since your last visit?  Hospitalized since your last visit? no    2

## 2020-07-06 NOTE — PROGRESS NOTES
FLORINDA  Dennys Lizzie is seen for follow-up care. Hypertension: Patient has hypertension. She has in the past been on amlodipine and HCTZ. She no longer takes the amlodipine. She is only on HCTZ. Blood pressure is stable. She denies headache, changes in vision or focal weakness. We will have her just take the HCTZ. She will keep a blood pressure log and follow-up with me at next visit. We will plan to check labs. Dyslipidemia: Patient has a history of dyslipidemia. She takes atorvastatin at bedtime. We will recheck a lipid panel. Malaise and fatigue: Patient has malaise and fatigue. We will recheck labs. She denies fever or chills. I will check CBC, CMP, TSH. COPD: Patient has a history of COPD. She is on inhaler medication. She denies cough or hemoptysis. Has occasional wheeze. She is stable on the medication. She will continue with the current treatment plan. Mood disorder: Patient recently lost her son and currently takes Paxil. Has not been taking the medication but I did encourage her to get back on it. States that she continues to miss her son and at times cries but lately things have improved. We will continue with the current medication plan. Hypovitaminosis D: Patient has a history of hypovitaminosis D. I will recheck vitamin D levels. She is on vitamin D replacement therapy. GERD: Patient is on omeprazole. She denies heartburn or dark stools. Continue current treatment plan. Obesity: Patient has a BMI of 31.09. She will intensify lifestyle and dietary modification.     Past Medical History  Past Medical History:   Diagnosis Date    Acid reflux     Anemia     COPD (chronic obstructive pulmonary disease) (HCC)     Cystic disease of kidney     GERD (gastroesophageal reflux disease)     Hemorrhoid     History of blood transfusion     HTN (hypertension)     Hyperlipidemia     Hypertension     Renal cyst     Thyroid disease        Surgical History  Past Surgical History:   Procedure Laterality Date    HX COLONOSCOPY      HX ENDOSCOPY      HX HEENT  2011    Partial thyroid    HX HYSTERECTOMY      HX ORTHOPAEDIC  2005    left shoulder sugery    HX PARTIAL HYSTERECTOMY  1990    HX THYROIDECTOMY          Medications  Current Outpatient Medications   Medication Sig Dispense Refill    Vitamin D2 1,250 mcg (50,000 unit) capsule TAKE 1 CAPSULE BY MOUTH ONCE A WEEK -  EVERY  7  DAYS 13 Cap 0    fluticasone propionate (FLONASE) 50 mcg/actuation nasal spray Use 2 spray(s) in each nostril once daily 16 g 0    atorvastatin (LIPITOR) 10 mg tablet Take 1 tablet by mouth once daily 90 Tab 1    hydroCHLOROthiazide (HYDRODIURIL) 12.5 mg tablet Take 1 tablet by mouth once daily 90 Tab 0    umeclidinium-vilanterol (ANORO ELLIPTA) 62.5-25 mcg/actuation inhaler Take 1 Puff by inhalation daily.  aspirin delayed-release 81 mg tablet 81 mg.      albuterol (PROVENTIL HFA, VENTOLIN HFA, PROAIR HFA) 90 mcg/actuation inhaler Take  by inhalation.  omeprazole (PRILOSEC) 20 mg capsule Take 20 mg by mouth daily.       hydrOXYzine HCL (ATARAX) 25 mg tablet TAKE 1 TABLET BY MOUTH THREE TIMES DAILY AS NEEDED FOR ANXIETY 60 Tab 0    PARoxetine (PAXIL) 10 mg tablet Take 1 tablet by mouth once daily 90 Tab 0       Allergies  Allergies   Allergen Reactions    Latex, Natural Rubber Rash       Family History  Family History   Problem Relation Age of Onset    Diabetes Mother     Hypertension Mother     Cancer Father     Hypertension Father        Social History  Social History     Socioeconomic History    Marital status: SINGLE     Spouse name: Not on file    Number of children: Not on file    Years of education: Not on file    Highest education level: Not on file   Occupational History    Not on file   Social Needs    Financial resource strain: Not on file    Food insecurity     Worry: Not on file     Inability: Not on file    Transportation needs     Medical: Not on file Non-medical: Not on file   Tobacco Use    Smoking status: Former Smoker     Packs/day: 0.30     Years: 27.00     Pack years: 8.10     Types: Cigarettes    Smokeless tobacco: Never Used   Substance and Sexual Activity    Alcohol use: No     Alcohol/week: 0.0 standard drinks    Drug use: No    Sexual activity: Never   Lifestyle    Physical activity     Days per week: Not on file     Minutes per session: Not on file    Stress: Not on file   Relationships    Social connections     Talks on phone: Not on file     Gets together: Not on file     Attends Congregational service: Not on file     Active member of club or organization: Not on file     Attends meetings of clubs or organizations: Not on file     Relationship status: Not on file    Intimate partner violence     Fear of current or ex partner: Not on file     Emotionally abused: Not on file     Physically abused: Not on file     Forced sexual activity: Not on file   Other Topics Concern    Not on file   Social History Narrative    ** Merged History Encounter **            Review of Systems  Review of Systems - Review of all systems is negative except as noted above in the HPI.     Vital Signs  Visit Vitals  /67 (BP 1 Location: Left arm, BP Patient Position: Sitting)   Pulse 94   Temp 99 °F (37.2 °C) (Oral)   Resp 24   Ht 5' 2\" (1.575 m)   Wt 170 lb (77.1 kg)   LMP 05/08/1981   SpO2 92%   BMI 31.09 kg/m²         Physical Exam  Physical Examination: General appearance - alert, well appearing, and in no distress, oriented to person, place, and time, overweight and acyanotic, in no respiratory distress  Mental status - alert, oriented to person, place, and time, affect appropriate to mood  Neck - supple, no significant adenopathy  Lymphatics - no palpable lymphadenopathy  Chest - clear to auscultation, no wheezes, rales or rhonchi, symmetric air entry  Heart - normal rate and regular rhythm, S1 and S2 normal  Abdomen - no rebound tenderness noted  Neurological - motor and sensory grossly normal bilaterally  Musculoskeletal - no muscular tenderness noted  Extremities - no pedal edema noted, intact peripheral pulses        Results  Results for orders placed or performed during the hospital encounter of 02/05/20   LIPID PANEL   Result Value Ref Range    LIPID PROFILE          Cholesterol, total 158 <200 MG/DL    Triglyceride 99 <150 MG/DL    HDL Cholesterol 40 40 - 60 MG/DL    LDL, calculated 98.2 0 - 100 MG/DL    VLDL, calculated 19.8 MG/DL    CHOL/HDL Ratio 4.0 0 - 5.0     HEMOGLOBIN A1C WITH EAG   Result Value Ref Range    Hemoglobin A1c 6.0 (H) 4.2 - 5.6 %    Est. average glucose 126 mg/dL       ASSESSMENT and PLAN    ICD-10-CM ICD-9-CM    1. Essential hypertension I10 401.9 CBC WITH AUTOMATED DIFF      METABOLIC PANEL, COMPREHENSIVE   2. Hypovitaminosis D E55.9 268.9 VITAMIN D, 25 HYDROXY   3. Gastroesophageal reflux disease, esophagitis presence not specified K21.9 530.81    4. Dyslipidemia E78.5 272.4    5. Hyperglycemia R73.9 790.29    6. Chronic obstructive pulmonary disease with acute exacerbation (HCC) J44.1 491.21    7. Depression, unspecified depression type F32.9 311    8. Thyroid disorder E07.9 246.9 TSH 3RD GENERATION   9. Obesity (BMI 30-39. 9) E66.9 278.00      lab results and schedule of future lab studies reviewed with patient  reviewed diet, exercise and weight control  cardiovascular risk and specific lipid/LDL goals reviewed  reviewed medications and side effects in detail      I have discussed the diagnosis with the patient and the intended plan of care as seen in the above orders. The patient has received an after-visit summary and questions were answered concerning future plans. I have discussed medication, side effects, and warnings with the patient in detail. The patient verbalized understanding and is in agreement with the plan of care. The patient will contact the office with any additional concerns.     Hilario House MD    PLEASE NOTE:   This document has been produced using voice recognition software.  Unrecognized errors in transcription may be present

## 2020-07-09 ENCOUNTER — HOSPITAL ENCOUNTER (OUTPATIENT)
Dept: LAB | Age: 65
Discharge: HOME | End: 2020-07-09
Payer: MEDICARE

## 2020-07-09 ENCOUNTER — LAB ONLY (OUTPATIENT)
Dept: FAMILY MEDICINE CLINIC | Age: 65
End: 2020-07-09

## 2020-07-09 DIAGNOSIS — R73.9 HYPERGLYCEMIA: ICD-10-CM

## 2020-07-09 DIAGNOSIS — E55.9 HYPOVITAMINOSIS D: ICD-10-CM

## 2020-07-09 DIAGNOSIS — Z01.89 ENCOUNTER FOR LABORATORY EXAMINATION: Primary | ICD-10-CM

## 2020-07-09 DIAGNOSIS — E07.9 THYROID DISORDER: ICD-10-CM

## 2020-07-09 DIAGNOSIS — I10 ESSENTIAL HYPERTENSION: ICD-10-CM

## 2020-07-09 DIAGNOSIS — E78.5 DYSLIPIDEMIA: ICD-10-CM

## 2020-07-09 LAB
25(OH)D3 SERPL-MCNC: 76.9 NG/ML (ref 30–100)
ALBUMIN SERPL-MCNC: 4.3 G/DL (ref 3.4–5)
ALBUMIN/GLOB SERPL: 1.3 {RATIO} (ref 0.8–1.7)
ALP SERPL-CCNC: 82 U/L (ref 45–117)
ALT SERPL-CCNC: 29 U/L (ref 13–56)
ANION GAP SERPL CALC-SCNC: 4 MMOL/L (ref 3–18)
AST SERPL-CCNC: 17 U/L (ref 10–38)
BASOPHILS # BLD: 0.1 K/UL (ref 0–0.1)
BASOPHILS NFR BLD: 1 % (ref 0–2)
BILIRUB SERPL-MCNC: 0.7 MG/DL (ref 0.2–1)
BUN SERPL-MCNC: 20 MG/DL (ref 7–18)
BUN/CREAT SERPL: 20 (ref 12–20)
CALCIUM SERPL-MCNC: 9.3 MG/DL (ref 8.5–10.1)
CHLORIDE SERPL-SCNC: 104 MMOL/L (ref 100–111)
CO2 SERPL-SCNC: 31 MMOL/L (ref 21–32)
CREAT SERPL-MCNC: 0.98 MG/DL (ref 0.6–1.3)
DIFFERENTIAL METHOD BLD: NORMAL
EOSINOPHIL # BLD: 0.4 K/UL (ref 0–0.4)
EOSINOPHIL NFR BLD: 5 % (ref 0–5)
ERYTHROCYTE [DISTWIDTH] IN BLOOD BY AUTOMATED COUNT: 14.4 % (ref 11.6–14.5)
GLOBULIN SER CALC-MCNC: 3.4 G/DL (ref 2–4)
GLUCOSE SERPL-MCNC: 99 MG/DL (ref 74–99)
HCT VFR BLD AUTO: 41.9 % (ref 35–45)
HGB BLD-MCNC: 13.6 G/DL (ref 12–16)
LYMPHOCYTES # BLD: 2.3 K/UL (ref 0.9–3.6)
LYMPHOCYTES NFR BLD: 30 % (ref 21–52)
MCH RBC QN AUTO: 28.8 PG (ref 24–34)
MCHC RBC AUTO-ENTMCNC: 32.5 G/DL (ref 31–37)
MCV RBC AUTO: 88.6 FL (ref 74–97)
MONOCYTES # BLD: 0.5 K/UL (ref 0.05–1.2)
MONOCYTES NFR BLD: 7 % (ref 3–10)
NEUTS SEG # BLD: 4.4 K/UL (ref 1.8–8)
NEUTS SEG NFR BLD: 57 % (ref 40–73)
PLATELET # BLD AUTO: 394 K/UL (ref 135–420)
PMV BLD AUTO: 10.3 FL (ref 9.2–11.8)
POTASSIUM SERPL-SCNC: 4 MMOL/L (ref 3.5–5.5)
PROT SERPL-MCNC: 7.7 G/DL (ref 6.4–8.2)
RBC # BLD AUTO: 4.73 M/UL (ref 4.2–5.3)
SODIUM SERPL-SCNC: 139 MMOL/L (ref 136–145)
TSH SERPL DL<=0.05 MIU/L-ACNC: 0.8 UIU/ML (ref 0.36–3.74)
WBC # BLD AUTO: 7.7 K/UL (ref 4.6–13.2)

## 2020-07-09 PROCEDURE — 85025 COMPLETE CBC W/AUTO DIFF WBC: CPT

## 2020-07-09 PROCEDURE — 84443 ASSAY THYROID STIM HORMONE: CPT

## 2020-07-09 PROCEDURE — 82306 VITAMIN D 25 HYDROXY: CPT

## 2020-07-09 PROCEDURE — 80053 COMPREHEN METABOLIC PANEL: CPT

## 2020-07-09 NOTE — PROGRESS NOTES
Patient presents for lab draw ordered by Dr. Dinora May  Ordering Department/Practice:  [facility]  Date Ordered:  7-6-2020     The following labs were drawn and sent to [facility]     CBC, CMP, TSH, 3rd Generation and Vitamin D Hydroxy    The following tubes were sent:    Gold  ( 1) and Lavender  ( 1)    Draw site:  RAC (outer)  Pain Level:0  Needle Gauge21  Aseptic technique used  Blood thinners:n  Band-Aid applied  Draw fee added   Procedure tolerated well, patient voiced no complaints.

## 2020-07-18 DIAGNOSIS — F41.9 ANXIETY: ICD-10-CM

## 2020-07-20 RX ORDER — HYDROXYZINE 25 MG/1
TABLET, FILM COATED ORAL
Qty: 60 TAB | Refills: 0 | Status: SHIPPED | OUTPATIENT
Start: 2020-07-20 | End: 2020-08-17

## 2020-07-26 DIAGNOSIS — N95.1 MENOPAUSAL AND FEMALE CLIMACTERIC STATES: ICD-10-CM

## 2020-07-27 RX ORDER — PAROXETINE 10 MG/1
TABLET, FILM COATED ORAL
Qty: 30 TAB | Refills: 0 | Status: SHIPPED | OUTPATIENT
Start: 2020-07-27 | End: 2020-09-02

## 2020-08-10 RX ORDER — FLUTICASONE PROPIONATE 50 MCG
SPRAY, SUSPENSION (ML) NASAL
Qty: 16 G | Refills: 0 | Status: SHIPPED | OUTPATIENT
Start: 2020-08-10 | End: 2020-09-30

## 2020-08-12 DIAGNOSIS — I10 ESSENTIAL HYPERTENSION: ICD-10-CM

## 2020-08-12 RX ORDER — HYDROCHLOROTHIAZIDE 12.5 MG/1
TABLET ORAL
Qty: 90 TAB | Refills: 0 | Status: SHIPPED | OUTPATIENT
Start: 2020-08-12 | End: 2020-08-27 | Stop reason: SDUPTHER

## 2020-08-15 DIAGNOSIS — F41.9 ANXIETY: ICD-10-CM

## 2020-08-17 RX ORDER — HYDROXYZINE 25 MG/1
TABLET, FILM COATED ORAL
Qty: 60 TAB | Refills: 0 | Status: SHIPPED | OUTPATIENT
Start: 2020-08-17 | End: 2020-08-27

## 2020-08-25 ENCOUNTER — TELEPHONE (OUTPATIENT)
Dept: FAMILY MEDICINE CLINIC | Age: 65
End: 2020-08-25

## 2020-08-25 DIAGNOSIS — G47.00 INSOMNIA, UNSPECIFIED TYPE: Primary | ICD-10-CM

## 2020-08-25 RX ORDER — TRAZODONE HYDROCHLORIDE 50 MG/1
50 TABLET ORAL
Qty: 30 TAB | Refills: 1 | Status: SHIPPED | OUTPATIENT
Start: 2020-08-25 | End: 2020-10-28

## 2020-08-25 NOTE — TELEPHONE ENCOUNTER
I have sent in trazodone to take. She should set up appointment for follow up care.   Eliseo Chou MD

## 2020-08-27 ENCOUNTER — TELEPHONE (OUTPATIENT)
Dept: FAMILY MEDICINE CLINIC | Age: 65
End: 2020-08-27

## 2020-08-27 DIAGNOSIS — F41.9 ANXIETY: ICD-10-CM

## 2020-08-27 DIAGNOSIS — I10 ESSENTIAL HYPERTENSION: ICD-10-CM

## 2020-08-27 RX ORDER — HYDROXYZINE 25 MG/1
TABLET, FILM COATED ORAL
Qty: 60 TAB | Refills: 0 | Status: SHIPPED | OUTPATIENT
Start: 2020-08-27 | End: 2020-08-27 | Stop reason: SDUPTHER

## 2020-08-27 RX ORDER — HYDROCHLOROTHIAZIDE 12.5 MG/1
TABLET ORAL
Qty: 90 TAB | Refills: 0 | Status: SHIPPED | OUTPATIENT
Start: 2020-08-27 | End: 2021-03-16

## 2020-08-27 RX ORDER — HYDROXYZINE 25 MG/1
TABLET, FILM COATED ORAL
Qty: 60 TAB | Refills: 0 | Status: SHIPPED | OUTPATIENT
Start: 2020-08-27 | End: 2020-10-02

## 2020-08-27 NOTE — TELEPHONE ENCOUNTER
Pt is requesting for Dr Julien Yuen to send in a refill for Hydroxyzine HCL (ATARAX) 25mg stated she has been feeling dizzy and lightheaded and just wanted Dr Julien Yuen to send in a prescription for this medication. Pt stated she was on 2 types of blood pressure medication before and she only has 1 type. Ms Kenny Soliz seems confused about which medication she needs. Stated she has one but needs the other one as well. Please contact Ms Kenny Soliz when available to clarify which medication she needs.  She stated she wanted Dr Julien Yuen to call this in before he leaves today because she knows he is not here on Fridays

## 2020-08-27 NOTE — TELEPHONE ENCOUNTER
Patient is only on HCTZ as a blood pressure medication and I will send in a refill of this. I have also sent in a refill of hydroxyzine.   Steven Smyth MD

## 2020-09-08 RX ORDER — AMLODIPINE BESYLATE 10 MG/1
TABLET ORAL
Qty: 90 TAB | Refills: 0 | Status: SHIPPED | OUTPATIENT
Start: 2020-09-08 | End: 2021-05-03 | Stop reason: CLARIF

## 2020-09-30 RX ORDER — FLUTICASONE PROPIONATE 50 MCG
SPRAY, SUSPENSION (ML) NASAL
Qty: 16 G | Refills: 0 | Status: SHIPPED | OUTPATIENT
Start: 2020-09-30 | End: 2020-11-09

## 2020-10-01 DIAGNOSIS — E55.9 HYPOVITAMINOSIS D: ICD-10-CM

## 2020-10-01 DIAGNOSIS — F41.9 ANXIETY: ICD-10-CM

## 2020-10-02 ENCOUNTER — TELEPHONE (OUTPATIENT)
Dept: FAMILY MEDICINE CLINIC | Age: 65
End: 2020-10-02

## 2020-10-02 RX ORDER — HYDROXYZINE 25 MG/1
TABLET, FILM COATED ORAL
Qty: 60 TAB | Refills: 0 | Status: SHIPPED | OUTPATIENT
Start: 2020-10-02 | End: 2020-11-20

## 2020-10-02 RX ORDER — ERGOCALCIFEROL 1.25 MG/1
CAPSULE ORAL
Qty: 13 CAP | Refills: 0 | Status: SHIPPED | OUTPATIENT
Start: 2020-10-02 | End: 2020-12-31 | Stop reason: SDUPTHER

## 2020-10-02 NOTE — TELEPHONE ENCOUNTER
Patient called 2X regarding her anxiety medication and her vit D medication. She was very anxious the 2nd phone call because the pharmacy stated it was faxed on Monday. She is completed out and needs it filled. I advised Dr. Homer Vences not in the office on Fridays, she insisted she wants it filled.   Please call and speak with patient

## 2020-10-05 ENCOUNTER — OFFICE VISIT (OUTPATIENT)
Dept: FAMILY MEDICINE CLINIC | Age: 65
End: 2020-10-05
Payer: MEDICARE

## 2020-10-05 VITALS
OXYGEN SATURATION: 94 % | RESPIRATION RATE: 18 BRPM | HEIGHT: 62 IN | BODY MASS INDEX: 31.83 KG/M2 | DIASTOLIC BLOOD PRESSURE: 76 MMHG | WEIGHT: 173 LBS | HEART RATE: 104 BPM | SYSTOLIC BLOOD PRESSURE: 127 MMHG

## 2020-10-05 DIAGNOSIS — J32.4 PANSINUSITIS, UNSPECIFIED CHRONICITY: Primary | ICD-10-CM

## 2020-10-05 DIAGNOSIS — I10 ESSENTIAL HYPERTENSION: ICD-10-CM

## 2020-10-05 DIAGNOSIS — F32.A DEPRESSION, UNSPECIFIED DEPRESSION TYPE: ICD-10-CM

## 2020-10-05 DIAGNOSIS — E55.9 HYPOVITAMINOSIS D: ICD-10-CM

## 2020-10-05 DIAGNOSIS — E78.5 DYSLIPIDEMIA: ICD-10-CM

## 2020-10-05 DIAGNOSIS — K21.9 GASTROESOPHAGEAL REFLUX DISEASE WITHOUT ESOPHAGITIS: ICD-10-CM

## 2020-10-05 DIAGNOSIS — Z23 ENCOUNTER FOR IMMUNIZATION: ICD-10-CM

## 2020-10-05 PROCEDURE — G8536 NO DOC ELDER MAL SCRN: HCPCS | Performed by: FAMILY MEDICINE

## 2020-10-05 PROCEDURE — 90732 PPSV23 VACC 2 YRS+ SUBQ/IM: CPT

## 2020-10-05 PROCEDURE — 3017F COLORECTAL CA SCREEN DOC REV: CPT | Performed by: FAMILY MEDICINE

## 2020-10-05 PROCEDURE — G8427 DOCREV CUR MEDS BY ELIG CLIN: HCPCS | Performed by: FAMILY MEDICINE

## 2020-10-05 PROCEDURE — G0009 ADMIN PNEUMOCOCCAL VACCINE: HCPCS

## 2020-10-05 PROCEDURE — G8399 PT W/DXA RESULTS DOCUMENT: HCPCS | Performed by: FAMILY MEDICINE

## 2020-10-05 PROCEDURE — G9899 SCRN MAM PERF RSLTS DOC: HCPCS | Performed by: FAMILY MEDICINE

## 2020-10-05 PROCEDURE — 1090F PRES/ABSN URINE INCON ASSESS: CPT | Performed by: FAMILY MEDICINE

## 2020-10-05 PROCEDURE — 99214 OFFICE O/P EST MOD 30 MIN: CPT | Performed by: FAMILY MEDICINE

## 2020-10-05 PROCEDURE — G8754 DIAS BP LESS 90: HCPCS | Performed by: FAMILY MEDICINE

## 2020-10-05 PROCEDURE — G8417 CALC BMI ABV UP PARAM F/U: HCPCS | Performed by: FAMILY MEDICINE

## 2020-10-05 PROCEDURE — G8752 SYS BP LESS 140: HCPCS | Performed by: FAMILY MEDICINE

## 2020-10-05 PROCEDURE — 1101F PT FALLS ASSESS-DOCD LE1/YR: CPT | Performed by: FAMILY MEDICINE

## 2020-10-05 PROCEDURE — G8510 SCR DEP NEG, NO PLAN REQD: HCPCS | Performed by: FAMILY MEDICINE

## 2020-10-05 RX ORDER — AMOXICILLIN AND CLAVULANATE POTASSIUM 875; 125 MG/1; MG/1
1 TABLET, FILM COATED ORAL EVERY 12 HOURS
Qty: 20 TAB | Refills: 0 | Status: SHIPPED | OUTPATIENT
Start: 2020-10-05 | End: 2020-10-15

## 2020-10-05 NOTE — PROGRESS NOTES
Chief Complaint   Patient presents with    Hypertension     1. Have you been to the ER, urgent care clinic since your last visit? Hospitalized since your last visit? No    2. Have you seen or consulted any other health care providers outside of the [address] [place] [address] since your last visit? Include any pap smears or colon screening. No     HPI  Dinora Rapp comes in for follow-up care. Sinusitis: Patient has frontal and maxillary sinus pressure. She has postnasal drainage that is mucopurulent in nature. She has malaise and fatigue. Also has chills. Denies fever. Has tried over-the-counter medications and nasal spray with temporary relief. States that this feels like a sinus infection. He has had this in the past and did have an antibiotic. Would like an antibiotic for the same. I will send in Augmentin. I will follow-up in case of no improvement or worsening symptoms. Hypertension: Patient has hypertension. Blood pressure is stable. She denies headache, changes in vision or focal weakness. She takes amlodipine and HCTZ. We will continue with this medication. GERD: Patient has heartburn on and off with reflux symptoms. We discussed management options. She should take Prilosec daily. She denies dark stools or hematemesis. I will follow-up at next visit. Mood disorder: Patient has a history of mood disorder. Recently her son  and this has affected her. Other members of the family are unwell. She is on Paxil and trazodone. Medication has helped with the symptoms. She denies having any thoughts of harming herself. She will continue with the current treatment plan. Dyslipidemia: Patient has dyslipidemia. She is on atorvastatin. We will recheck labs at next visit. Continue current management plan. Hypovitaminosis D: Patient has hypovitaminosis D. She is on replacement therapy weekly. Plan to recheck labs at next visit.   Health maintenance: Patient will get the pneumonia vaccine PPSV23 today. She has already received flu vaccine. Past Medical History  Past Medical History:   Diagnosis Date    Acid reflux     Anemia     COPD (chronic obstructive pulmonary disease) (HCC)     Cystic disease of kidney     GERD (gastroesophageal reflux disease)     Hemorrhoid     History of blood transfusion     HTN (hypertension)     Hyperlipidemia     Hypertension     Renal cyst     Thyroid disease        Surgical History  Past Surgical History:   Procedure Laterality Date    HX COLONOSCOPY      HX ENDOSCOPY      HX HEENT  2011    Partial thyroid    HX HYSTERECTOMY      HX ORTHOPAEDIC  2005    left shoulder sugery    HX PARTIAL HYSTERECTOMY  1990    HX THYROIDECTOMY          Medications  Current Outpatient Medications   Medication Sig Dispense Refill    Vitamin D2 1,250 mcg (50,000 unit) capsule TAKE 1 CAPSULE BY MOUTH ONCE A WEEK (EVERY  7  DAYS) 13 Cap 0    hydrOXYzine HCL (ATARAX) 25 mg tablet TAKE 1 TABLET BY MOUTH THREE TIMES DAILY AS NEEDED FOR ANXIETY 60 Tab 0    fluticasone propionate (FLONASE) 50 mcg/actuation nasal spray Use 2 spray(s) in each nostril once daily 16 g 0    amLODIPine (NORVASC) 10 mg tablet Take 1 tablet by mouth once daily 90 Tab 0    PARoxetine (PAXIL) 10 mg tablet Take 1 tablet by mouth once daily 30 Tab 2    hydroCHLOROthiazide (HYDRODIURIL) 12.5 mg tablet Take 1 tablet by mouth once daily 90 Tab 0    traZODone (DESYREL) 50 mg tablet Take 1 Tab by mouth nightly. 30 Tab 1    atorvastatin (LIPITOR) 10 mg tablet Take 1 tablet by mouth once daily 90 Tab 1    umeclidinium-vilanterol (ANORO ELLIPTA) 62.5-25 mcg/actuation inhaler Take 1 Puff by inhalation daily.  aspirin delayed-release 81 mg tablet 81 mg.      albuterol (PROVENTIL HFA, VENTOLIN HFA, PROAIR HFA) 90 mcg/actuation inhaler Take  by inhalation.  omeprazole (PRILOSEC) 20 mg capsule Take 20 mg by mouth daily.          Allergies  Allergies   Allergen Reactions    Latex, Natural Rubber Rash       Family History  Family History   Problem Relation Age of Onset    Diabetes Mother     Hypertension Mother     Cancer Father     Hypertension Father        Social History  Social History     Socioeconomic History    Marital status: SINGLE     Spouse name: Not on file    Number of children: Not on file    Years of education: Not on file    Highest education level: Not on file   Occupational History    Not on file   Social Needs    Financial resource strain: Not on file    Food insecurity     Worry: Not on file     Inability: Not on file    Transportation needs     Medical: Not on file     Non-medical: Not on file   Tobacco Use    Smoking status: Former Smoker     Packs/day: 0.30     Years: 27.00     Pack years: 8.10     Types: Cigarettes    Smokeless tobacco: Never Used   Substance and Sexual Activity    Alcohol use: No     Alcohol/week: 0.0 standard drinks    Drug use: No    Sexual activity: Never   Lifestyle    Physical activity     Days per week: Not on file     Minutes per session: Not on file    Stress: Not on file   Relationships    Social connections     Talks on phone: Not on file     Gets together: Not on file     Attends Christianity service: Not on file     Active member of club or organization: Not on file     Attends meetings of clubs or organizations: Not on file     Relationship status: Not on file    Intimate partner violence     Fear of current or ex partner: Not on file     Emotionally abused: Not on file     Physically abused: Not on file     Forced sexual activity: Not on file   Other Topics Concern    Not on file   Social History Narrative    ** Merged History Encounter **            Review of Systems  Review of Systems - Review of all systems is negative except as noted above in the HPI.     Vital Signs  Visit Vitals  /76 (BP 1 Location: Left arm, BP Patient Position: Sitting)   Pulse (!) 104   Resp 18   Ht 5' 2\" (1.575 m)   Wt 173 lb (78.5 kg)   LMP 05/08/1981   SpO2 94%   BMI 31.64 kg/m²         Physical Exam  Physical Examination: General appearance - oriented to person, place, and time, overweight, acyanotic, in no respiratory distress and well hydrated  Mental status - alert, oriented to person, place, and time, affect appropriate to mood  Ears - bilateral TM's and external ear canals normal  Nose - mucosal congestion, mucosal erythema and purulent rhinorrhea  Mouth - mucous membranes moist, pharynx normal without lesions  Neck - supple, no significant adenopathy  Lymphatics - no palpable lymphadenopathy  Chest - clear to auscultation, no wheezes, rales or rhonchi, symmetric air entry  Heart - S1 and S2 normal  Abdomen - no rebound tenderness noted  Back exam - limited range of motion  Neurological - abnormal neurological exam unchanged from prior examinations  Musculoskeletal - osteoarthritic changes noted in both hands  Extremities - intact peripheral pulses        Results  Results for orders placed or performed during the hospital encounter of 07/09/20   TSH 3RD GENERATION   Result Value Ref Range    TSH 0.80 0.36 - 3.74 uIU/mL   CBC WITH AUTOMATED DIFF   Result Value Ref Range    WBC 7.7 4.6 - 13.2 K/uL    RBC 4.73 4.20 - 5.30 M/uL    HGB 13.6 12.0 - 16.0 g/dL    HCT 41.9 35.0 - 45.0 %    MCV 88.6 74.0 - 97.0 FL    MCH 28.8 24.0 - 34.0 PG    MCHC 32.5 31.0 - 37.0 g/dL    RDW 14.4 11.6 - 14.5 %    PLATELET 071 560 - 501 K/uL    MPV 10.3 9.2 - 11.8 FL    NEUTROPHILS 57 40 - 73 %    LYMPHOCYTES 30 21 - 52 %    MONOCYTES 7 3 - 10 %    EOSINOPHILS 5 0 - 5 %    BASOPHILS 1 0 - 2 %    ABS. NEUTROPHILS 4.4 1.8 - 8.0 K/UL    ABS. LYMPHOCYTES 2.3 0.9 - 3.6 K/UL    ABS. MONOCYTES 0.5 0.05 - 1.2 K/UL    ABS. EOSINOPHILS 0.4 0.0 - 0.4 K/UL    ABS.  BASOPHILS 0.1 0.0 - 0.1 K/UL    DF AUTOMATED     METABOLIC PANEL, COMPREHENSIVE   Result Value Ref Range    Sodium 139 136 - 145 mmol/L    Potassium 4.0 3.5 - 5.5 mmol/L    Chloride 104 100 - 111 mmol/L    CO2 31 21 - 32 mmol/L    Anion gap 4 3.0 - 18 mmol/L    Glucose 99 74 - 99 mg/dL    BUN 20 (H) 7.0 - 18 MG/DL    Creatinine 0.98 0.6 - 1.3 MG/DL    BUN/Creatinine ratio 20 12 - 20      GFR est AA >60 >60 ml/min/1.73m2    GFR est non-AA 57 (L) >60 ml/min/1.73m2    Calcium 9.3 8.5 - 10.1 MG/DL    Bilirubin, total 0.7 0.2 - 1.0 MG/DL    ALT (SGPT) 29 13 - 56 U/L    AST (SGOT) 17 10 - 38 U/L    Alk. phosphatase 82 45 - 117 U/L    Protein, total 7.7 6.4 - 8.2 g/dL    Albumin 4.3 3.4 - 5.0 g/dL    Globulin 3.4 2.0 - 4.0 g/dL    A-G Ratio 1.3 0.8 - 1.7     VITAMIN D, 25 HYDROXY   Result Value Ref Range    Vitamin D 25-Hydroxy 76.9 30 - 100 ng/mL       ASSESSMENT and PLAN    ICD-10-CM ICD-9-CM    1. Pansinusitis, unspecified chronicity  J32.4 473.8 amoxicillin-clavulanate (AUGMENTIN) 875-125 mg per tablet   2. Encounter for immunization  Z23 V03.89 PNEUMOCOCCAL POLYSACCHARIDE VACCINE, 23-VALENT, ADULT OR IMMUNOSUPPRESSED PT DOSE,      ADMIN PNEUMOCOCCAL VACCINE   3. Essential hypertension  I10 401.9    4. Depression, unspecified depression type  F32.9 311    5. Gastroesophageal reflux disease without esophagitis  K21.9 530.81    6. Hypovitaminosis D  E55.9 268.9    7. Dyslipidemia  E78.5 272.4      reviewed diet, exercise and weight control  cardiovascular risk and specific lipid/LDL goals reviewed  reviewed medications and side effects in detail      I have discussed the diagnosis with the patient and the intended plan of care as seen in the above orders. The patient has received an after-visit summary and questions were answered concerning future plans. I have discussed medication, side effects, and warnings with the patient in detail. The patient verbalized understanding and is in agreement with the plan of care. The patient will contact the office with any additional concerns. Glenny Cordero MD    PLEASE NOTE:   This document has been produced using voice recognition software.  Unrecognized errors in transcription may be present

## 2020-10-05 NOTE — PROGRESS NOTES
PPSV23 ordered by PCP at this time. Vaccine given to patient right deltoid. No other concerns noted at this time. Patient tolerated well

## 2020-11-09 RX ORDER — FLUTICASONE PROPIONATE 50 MCG
SPRAY, SUSPENSION (ML) NASAL
Qty: 16 G | Refills: 0 | Status: SHIPPED | OUTPATIENT
Start: 2020-11-09 | End: 2021-01-04 | Stop reason: SDUPTHER

## 2020-11-20 DIAGNOSIS — F41.9 ANXIETY: ICD-10-CM

## 2020-11-20 RX ORDER — HYDROXYZINE 25 MG/1
TABLET, FILM COATED ORAL
Qty: 60 TAB | Refills: 0 | Status: SHIPPED | OUTPATIENT
Start: 2020-11-20 | End: 2020-12-15

## 2020-12-14 DIAGNOSIS — F41.9 ANXIETY: ICD-10-CM

## 2020-12-15 RX ORDER — HYDROXYZINE 25 MG/1
TABLET, FILM COATED ORAL
Qty: 60 TAB | Refills: 0 | Status: SHIPPED | OUTPATIENT
Start: 2020-12-15 | End: 2021-01-06

## 2020-12-31 DIAGNOSIS — E55.9 HYPOVITAMINOSIS D: ICD-10-CM

## 2020-12-31 DIAGNOSIS — N95.1 MENOPAUSAL AND FEMALE CLIMACTERIC STATES: ICD-10-CM

## 2020-12-31 DIAGNOSIS — E78.5 DYSLIPIDEMIA: ICD-10-CM

## 2020-12-31 DIAGNOSIS — G47.00 INSOMNIA, UNSPECIFIED TYPE: ICD-10-CM

## 2020-12-31 NOTE — TELEPHONE ENCOUNTER
Requested Prescriptions     Pending Prescriptions Disp Refills    atorvastatin (LIPITOR) 10 mg tablet 90 Tab 1     Sig: Take 1 tablet by mouth once daily    ergocalciferol (Vitamin D2) 1,250 mcg (50,000 unit) capsule 13 Cap 0    PARoxetine (PAXIL) 10 mg tablet 30 Tab 2    traZODone (DESYREL) 50 mg tablet 30 Tab 2     Patient requesting the following medication refill         Date last prescribed: 04/20,10/28/20,08/27/20    Date patient last seen: 10/05/20    Follow up appointment scheduled: 02/08/21    Please Advise

## 2021-01-04 RX ORDER — FLUTICASONE PROPIONATE 50 MCG
SPRAY, SUSPENSION (ML) NASAL
Qty: 16 G | Refills: 0 | Status: SHIPPED | OUTPATIENT
Start: 2021-01-04 | End: 2021-02-21

## 2021-01-04 RX ORDER — PAROXETINE 10 MG/1
TABLET, FILM COATED ORAL
Qty: 30 TAB | Refills: 2 | Status: SHIPPED | OUTPATIENT
Start: 2021-01-04 | End: 2021-04-13

## 2021-01-04 RX ORDER — ERGOCALCIFEROL 1.25 MG/1
CAPSULE ORAL
Qty: 13 CAP | Refills: 0 | Status: SHIPPED | OUTPATIENT
Start: 2021-01-04 | End: 2021-02-23

## 2021-01-04 RX ORDER — ATORVASTATIN CALCIUM 10 MG/1
TABLET, FILM COATED ORAL
Qty: 90 TAB | Refills: 1 | Status: SHIPPED | OUTPATIENT
Start: 2021-01-04 | End: 2021-01-12 | Stop reason: SDUPTHER

## 2021-01-04 RX ORDER — TRAZODONE HYDROCHLORIDE 50 MG/1
TABLET ORAL
Qty: 30 TAB | Refills: 2 | Status: SHIPPED | OUTPATIENT
Start: 2021-01-04 | End: 2021-05-27 | Stop reason: SDUPTHER

## 2021-01-04 NOTE — TELEPHONE ENCOUNTER
Patient requesting the following medication refill            Date last prescribed: 04/20,10/28/20,08/27/20     Date patient last seen: 10/05/20     Follow up appointment scheduled: 02/08/21     Please Advise     *Possible duplicate message*    Thank you

## 2021-01-04 NOTE — TELEPHONE ENCOUNTER
Requested Prescriptions     Pending Prescriptions Disp Refills    atorvastatin (LIPITOR) 10 mg tablet 90 Tab 1     Sig: Take 1 tablet by mouth once daily    ergocalciferol (Vitamin D2) 1,250 mcg (50,000 unit) capsule 13 Cap 0    PARoxetine (PAXIL) 10 mg tablet 30 Tab 2    traZODone (DESYREL) 50 mg tablet 30 Tab 2    fluticasone propionate (FLONASE) 50 mcg/actuation nasal spray 16 g 0

## 2021-01-05 DIAGNOSIS — F41.9 ANXIETY: ICD-10-CM

## 2021-01-05 NOTE — TELEPHONE ENCOUNTER
Requested Prescriptions     Pending Prescriptions Disp Refills    hydrOXYzine HCL (ATARAX) 25 mg tablet [Pharmacy Med Name: hydrOXYzine HCl 25 MG Oral Tablet] 60 Tab 0     Sig: TAKE 1 TABLET BY MOUTH THREE TIMES DAILY AS NEEDED FOR ANXIETY Unknown if ever smoked

## 2021-01-05 NOTE — TELEPHONE ENCOUNTER
Requested Prescriptions     Pending Prescriptions Disp Refills    hydrOXYzine HCL (ATARAX) 25 mg tablet [Pharmacy Med Name: hydrOXYzine HCl 25 MG Oral Tablet] 60 Tab 0     Sig: TAKE 1 TABLET BY MOUTH THREE TIMES DAILY AS NEEDED FOR ANXIETY     Patient requesting the following medication refill         Date last prescribed: 12/15/20    Date patient last seen: 10/05/20    Follow up appointment scheduled: 02/08/21    Please Advise

## 2021-01-06 RX ORDER — HYDROXYZINE 25 MG/1
TABLET, FILM COATED ORAL
Qty: 60 TAB | Refills: 0 | Status: SHIPPED | OUTPATIENT
Start: 2021-01-06 | End: 2021-02-01

## 2021-01-12 ENCOUNTER — VIRTUAL VISIT (OUTPATIENT)
Dept: FAMILY MEDICINE CLINIC | Age: 66
End: 2021-01-12
Payer: MEDICARE

## 2021-01-12 DIAGNOSIS — I10 ESSENTIAL HYPERTENSION: ICD-10-CM

## 2021-01-12 DIAGNOSIS — J32.9 SINUSITIS, UNSPECIFIED CHRONICITY, UNSPECIFIED LOCATION: Primary | ICD-10-CM

## 2021-01-12 DIAGNOSIS — G47.00 INSOMNIA, UNSPECIFIED TYPE: ICD-10-CM

## 2021-01-12 DIAGNOSIS — E78.5 DYSLIPIDEMIA: ICD-10-CM

## 2021-01-12 PROCEDURE — 99213 OFFICE O/P EST LOW 20 MIN: CPT | Performed by: FAMILY MEDICINE

## 2021-01-12 PROCEDURE — 3017F COLORECTAL CA SCREEN DOC REV: CPT | Performed by: FAMILY MEDICINE

## 2021-01-12 PROCEDURE — G8399 PT W/DXA RESULTS DOCUMENT: HCPCS | Performed by: FAMILY MEDICINE

## 2021-01-12 PROCEDURE — 1090F PRES/ABSN URINE INCON ASSESS: CPT | Performed by: FAMILY MEDICINE

## 2021-01-12 PROCEDURE — G8756 NO BP MEASURE DOC: HCPCS | Performed by: FAMILY MEDICINE

## 2021-01-12 PROCEDURE — 1101F PT FALLS ASSESS-DOCD LE1/YR: CPT | Performed by: FAMILY MEDICINE

## 2021-01-12 PROCEDURE — G8427 DOCREV CUR MEDS BY ELIG CLIN: HCPCS | Performed by: FAMILY MEDICINE

## 2021-01-12 PROCEDURE — G8510 SCR DEP NEG, NO PLAN REQD: HCPCS | Performed by: FAMILY MEDICINE

## 2021-01-12 RX ORDER — ATORVASTATIN CALCIUM 10 MG/1
TABLET, FILM COATED ORAL
Qty: 90 TAB | Refills: 1 | Status: SHIPPED | OUTPATIENT
Start: 2021-01-12 | End: 2021-04-05

## 2021-01-12 RX ORDER — AMOXICILLIN 500 MG/1
500 CAPSULE ORAL 3 TIMES DAILY
Qty: 30 CAP | Refills: 0 | Status: SHIPPED | OUTPATIENT
Start: 2021-01-12 | End: 2021-01-22

## 2021-01-12 NOTE — PROGRESS NOTES
Dasia Rosales is a 76 y.o. female who was seen by synchronous (real-time) audio-video technology on 1/12/2021 for Sinus Infection, Hypertension, and Cholesterol Problem        Assessment & Plan:       ICD-10-CM ICD-9-CM    1. Sinusitis, unspecified chronicity, unspecified location  J32.9 473.9 amoxicillin (AMOXIL) 500 mg capsule   2. Dyslipidemia  E78.5 272.4 atorvastatin (LIPITOR) 10 mg tablet   3. Insomnia, unspecified type  G47.00 780.52    4. Essential hypertension  I10 401.9      Subjective:   Dasia Rosales is seen for follow-up care. Sinusitis: Patient has frontal and maxillary sinus pressure. Has postnasal drainage that is mucopurulent in nature. Has tried over-the-counter medications without much relief. States that her antibiotic usually helps with the symptoms. She would like medication sent into the pharmacy. I will send in Amoxil. Dyslipidemia: Patient has dyslipidemia. She takes Lipitor 10 mg daily. Has been out of medication. We will recheck lipid panel at next visit. I will send a refill of the same. Insomnia: Patient has insomnia. She is on trazodone 50 mg daily. Has been stable on the medication. Continue current treatment plan. Hypertension: Patient has hypertension. She is on Norvasc and HCTZ. She denies headache, changes in vision or focal weakness. Prior to Admission medications    Medication Sig Start Date End Date Taking? Authorizing Provider   atorvastatin (LIPITOR) 10 mg tablet Take 1 tablet by mouth once daily 1/12/21  Yes Robbie Echavarria MD   amoxicillin (AMOXIL) 500 mg capsule Take 1 Cap by mouth three (3) times daily for 10 days.  1/12/21 1/22/21 Yes Robbie Echavarria MD   hydrOXYzine HCL (ATARAX) 25 mg tablet TAKE 1 TABLET BY MOUTH THREE TIMES DAILY AS NEEDED FOR ANXIETY 1/6/21   Robbie Echavarria MD   ergocalciferol (Vitamin D2) 1,250 mcg (50,000 unit) capsule TAKE 1 CAPSULE BY MOUTH ONCE A WEEK (EVERY  7  DAYS) 1/4/21   Lilian Echavarria MD   PARoxetine (PAXIL) 10 mg tablet Take 1 tablet by mouth once daily 1/4/21   Robbie Echavarria MD   traZODone (DESYREL) 50 mg tablet Take 1 tablet by mouth nightly 1/4/21   Robbie Echavarria MD   fluticasone propionate (FLONASE) 50 mcg/actuation nasal spray Use 2 spray(s) in each nostril once daily 1/4/21   Robbie Echavarria MD   atorvastatin (LIPITOR) 10 mg tablet Take 1 tablet by mouth once daily 1/4/21 1/12/21  Carlota Cameron MD   amLODIPine (NORVASC) 10 mg tablet Take 1 tablet by mouth once daily 9/8/20   Robbie Echavarria MD   hydroCHLOROthiazide (HYDRODIURIL) 12.5 mg tablet Take 1 tablet by mouth once daily 8/27/20   Carlota Cameron MD   umeclidinium-vilanterol (ANORO ELLIPTA) 62.5-25 mcg/actuation inhaler Take 1 Puff by inhalation daily. Provider, Historical   aspirin delayed-release 81 mg tablet 81 mg.    Provider, Historical   albuterol (PROVENTIL HFA, VENTOLIN HFA, PROAIR HFA) 90 mcg/actuation inhaler Take  by inhalation. Provider, Historical   omeprazole (PRILOSEC) 20 mg capsule Take 20 mg by mouth daily. Provider, Historical     ROS Review of all systems is negative except as noted above in the HPI. Objective:   No flowsheet data found. Constitutional: [x] Appears well-developed and well-nourished [x] No apparent distress      Mental status: [x] Alert and awake  [x] Oriented to person/place/time [x] Able to follow commands    Pulmonary/Chest: [x] Respiratory effort normal   [x] No visualized signs of difficulty breathing or respiratory distress            Neurological:        [x] No Facial Asymmetry (Cranial nerve 7 motor function) (limited exam due to video visit)                    Psychiatric:       [x] Normal Affect    We discussed the expected course, resolution and complications of the diagnosis(es) in detail. Medication risks, benefits, costs, interactions, and alternatives were discussed as indicated.   I advised her to contact the office if her condition worsens, changes or fails to improve as anticipated. She expressed understanding with the diagnosis(es) and plan. Reyes Waite, who was evaluated through a patient-initiated, synchronous (real-time) audio-video encounter, and/or her healthcare decision maker, is aware that it is a billable service, with coverage as determined by her insurance carrier. She provided verbal consent to proceed: Yes, and patient identification was verified. It was conducted pursuant to the emergency declaration under the [address] [place] [address] authority and the [employer] Act. A caregiver was present when appropriate. Ability to conduct physical exam was limited. I was in the office. The patient was at home.       Scotty Martines MD

## 2021-01-31 DIAGNOSIS — F41.9 ANXIETY: ICD-10-CM

## 2021-02-01 RX ORDER — HYDROXYZINE 25 MG/1
TABLET, FILM COATED ORAL
Qty: 60 TAB | Refills: 0 | Status: SHIPPED | OUTPATIENT
Start: 2021-02-01 | End: 2021-02-23

## 2021-02-21 RX ORDER — FLUTICASONE PROPIONATE 50 MCG
SPRAY, SUSPENSION (ML) NASAL
Qty: 16 G | Refills: 0 | Status: SHIPPED | OUTPATIENT
Start: 2021-02-21 | End: 2021-02-22 | Stop reason: SDUPTHER

## 2021-02-22 NOTE — TELEPHONE ENCOUNTER
Requested Prescriptions     Pending Prescriptions Disp Refills    fluticasone propionate (FLONASE) 50 mcg/actuation nasal spray 16 g 0

## 2021-02-23 DIAGNOSIS — F41.9 ANXIETY: ICD-10-CM

## 2021-02-23 DIAGNOSIS — E55.9 HYPOVITAMINOSIS D: ICD-10-CM

## 2021-02-23 RX ORDER — FLUTICASONE PROPIONATE 50 MCG
SPRAY, SUSPENSION (ML) NASAL
Qty: 16 G | Refills: 0 | Status: SHIPPED | OUTPATIENT
Start: 2021-02-23 | End: 2021-04-05

## 2021-02-23 RX ORDER — HYDROXYZINE 25 MG/1
TABLET, FILM COATED ORAL
Qty: 60 TAB | Refills: 0 | Status: SHIPPED | OUTPATIENT
Start: 2021-02-23 | End: 2021-03-22

## 2021-02-23 RX ORDER — ERGOCALCIFEROL 1.25 MG/1
CAPSULE ORAL
Qty: 13 CAP | Refills: 0 | Status: SHIPPED | OUTPATIENT
Start: 2021-02-23 | End: 2021-05-03 | Stop reason: CLARIF

## 2021-02-25 ENCOUNTER — TELEPHONE (OUTPATIENT)
Dept: FAMILY MEDICINE CLINIC | Age: 66
End: 2021-02-25

## 2021-02-25 RX ORDER — DOXYCYCLINE 100 MG/1
100 TABLET ORAL 2 TIMES DAILY
Qty: 20 TAB | Refills: 0 | Status: SHIPPED | OUTPATIENT
Start: 2021-02-25 | End: 2021-03-07

## 2021-02-25 NOTE — TELEPHONE ENCOUNTER
I have sent in doxycycline to take twice a day for 10 days. If symptoms not improved she will need to come in for evaluation.   Guadalupe Apley, MD

## 2021-02-25 NOTE — TELEPHONE ENCOUNTER
Pt stated she called yesterday 02/24 to ask if Dr Zeenat Espinal can send her something to the pharmacy for the dark green phylm she is coughing up. Stated he prescribed something before but she is still coughing.  Please follow up when available

## 2021-02-26 NOTE — TELEPHONE ENCOUNTER
Spoke with patient. Patient was given medication information. Patient stated she will contact office after the medication is complete.

## 2021-03-16 DIAGNOSIS — I10 ESSENTIAL HYPERTENSION: ICD-10-CM

## 2021-03-16 RX ORDER — HYDROCHLOROTHIAZIDE 12.5 MG/1
TABLET ORAL
Qty: 90 TAB | Refills: 0 | Status: SHIPPED | OUTPATIENT
Start: 2021-03-16 | End: 2021-08-29

## 2021-03-22 DIAGNOSIS — F41.9 ANXIETY: ICD-10-CM

## 2021-03-22 RX ORDER — HYDROXYZINE 25 MG/1
TABLET, FILM COATED ORAL
Qty: 60 TAB | Refills: 0 | Status: SHIPPED | OUTPATIENT
Start: 2021-03-22 | End: 2021-04-20

## 2021-04-02 DIAGNOSIS — E78.5 DYSLIPIDEMIA: ICD-10-CM

## 2021-04-05 RX ORDER — FLUTICASONE PROPIONATE 50 MCG
SPRAY, SUSPENSION (ML) NASAL
Qty: 16 G | Refills: 0 | Status: SHIPPED | OUTPATIENT
Start: 2021-04-05 | End: 2021-05-05

## 2021-04-05 RX ORDER — ATORVASTATIN CALCIUM 10 MG/1
TABLET, FILM COATED ORAL
Qty: 90 TAB | Refills: 0 | Status: SHIPPED | OUTPATIENT
Start: 2021-04-05 | End: 2021-05-27 | Stop reason: SDUPTHER

## 2021-04-13 ENCOUNTER — OFFICE VISIT (OUTPATIENT)
Dept: FAMILY MEDICINE CLINIC | Age: 66
End: 2021-04-13
Payer: MEDICARE

## 2021-04-13 VITALS
DIASTOLIC BLOOD PRESSURE: 74 MMHG | RESPIRATION RATE: 16 BRPM | HEIGHT: 62 IN | WEIGHT: 162 LBS | HEART RATE: 106 BPM | TEMPERATURE: 92 F | BODY MASS INDEX: 29.81 KG/M2 | OXYGEN SATURATION: 98 % | SYSTOLIC BLOOD PRESSURE: 112 MMHG

## 2021-04-13 DIAGNOSIS — I10 ESSENTIAL HYPERTENSION: ICD-10-CM

## 2021-04-13 DIAGNOSIS — E07.9 THYROID DISORDER: ICD-10-CM

## 2021-04-13 DIAGNOSIS — J44.1 CHRONIC OBSTRUCTIVE PULMONARY DISEASE WITH ACUTE EXACERBATION (HCC): ICD-10-CM

## 2021-04-13 DIAGNOSIS — J32.9 SINUSITIS, UNSPECIFIED CHRONICITY, UNSPECIFIED LOCATION: ICD-10-CM

## 2021-04-13 DIAGNOSIS — N95.1 MENOPAUSAL AND FEMALE CLIMACTERIC STATES: ICD-10-CM

## 2021-04-13 DIAGNOSIS — E55.9 HYPOVITAMINOSIS D: ICD-10-CM

## 2021-04-13 DIAGNOSIS — G47.00 INSOMNIA, UNSPECIFIED TYPE: ICD-10-CM

## 2021-04-13 DIAGNOSIS — R00.2 PALPITATIONS: ICD-10-CM

## 2021-04-13 DIAGNOSIS — E78.5 DYSLIPIDEMIA: ICD-10-CM

## 2021-04-13 DIAGNOSIS — R05.9 COUGH: Primary | ICD-10-CM

## 2021-04-13 DIAGNOSIS — F41.9 ANXIETY: ICD-10-CM

## 2021-04-13 PROBLEM — J18.9 COMMUNITY ACQUIRED PNEUMONIA OF LEFT LOWER LOBE OF LUNG: Status: ACTIVE | Noted: 2018-03-02

## 2021-04-13 PROBLEM — K43.9 VENTRAL HERNIA WITHOUT OBSTRUCTION OR GANGRENE: Status: ACTIVE | Noted: 2018-04-02

## 2021-04-13 PROCEDURE — 93000 ELECTROCARDIOGRAM COMPLETE: CPT | Performed by: FAMILY MEDICINE

## 2021-04-13 PROCEDURE — G8536 NO DOC ELDER MAL SCRN: HCPCS | Performed by: FAMILY MEDICINE

## 2021-04-13 PROCEDURE — G8510 SCR DEP NEG, NO PLAN REQD: HCPCS | Performed by: FAMILY MEDICINE

## 2021-04-13 PROCEDURE — G8754 DIAS BP LESS 90: HCPCS | Performed by: FAMILY MEDICINE

## 2021-04-13 PROCEDURE — G8399 PT W/DXA RESULTS DOCUMENT: HCPCS | Performed by: FAMILY MEDICINE

## 2021-04-13 PROCEDURE — G8427 DOCREV CUR MEDS BY ELIG CLIN: HCPCS | Performed by: FAMILY MEDICINE

## 2021-04-13 PROCEDURE — 3017F COLORECTAL CA SCREEN DOC REV: CPT | Performed by: FAMILY MEDICINE

## 2021-04-13 PROCEDURE — G8752 SYS BP LESS 140: HCPCS | Performed by: FAMILY MEDICINE

## 2021-04-13 PROCEDURE — 1101F PT FALLS ASSESS-DOCD LE1/YR: CPT | Performed by: FAMILY MEDICINE

## 2021-04-13 PROCEDURE — 1090F PRES/ABSN URINE INCON ASSESS: CPT | Performed by: FAMILY MEDICINE

## 2021-04-13 PROCEDURE — G8417 CALC BMI ABV UP PARAM F/U: HCPCS | Performed by: FAMILY MEDICINE

## 2021-04-13 PROCEDURE — 99215 OFFICE O/P EST HI 40 MIN: CPT | Performed by: FAMILY MEDICINE

## 2021-04-13 RX ORDER — PAROXETINE 10 MG/1
10 TABLET, FILM COATED ORAL DAILY
COMMUNITY
End: 2021-05-27 | Stop reason: SDUPTHER

## 2021-04-13 RX ORDER — AMOXICILLIN 500 MG/1
500 CAPSULE ORAL 3 TIMES DAILY
Qty: 30 CAP | Refills: 0 | Status: SHIPPED | OUTPATIENT
Start: 2021-04-13 | End: 2021-04-23

## 2021-04-13 RX ORDER — PLECANATIDE 3 MG/1
3 TABLET ORAL DAILY
COMMUNITY
Start: 2021-03-15

## 2021-04-13 RX ORDER — ALBUTEROL SULFATE 5 MG/ML
2.5 SOLUTION RESPIRATORY (INHALATION) 2 TIMES DAILY
COMMUNITY

## 2021-04-13 NOTE — PROGRESS NOTES
Chief Complaint   Patient presents with    Medication Evaluation     concerns about medication    Fatigue     sweating     1. Have you been to the ER, urgent care clinic since your last visit? Hospitalized since your last visit? No    2. Have you seen or consulted any other health care providers outside of the [address] [place] [address] since your last visit? Include any pap smears or colon screening.  No

## 2021-04-13 NOTE — PROGRESS NOTES
[facility]  Carolina Maldonado comes in for f/u care. HTN: Patient has hypertension. She is on HCTZ and amlodipine. Blood pressure is stable. He denies headache, changes in vision focal weakness. Continue current treatment plan. We will check labs. Palpitations: Patient has been having palpitations on and off. She denies syncope or chest pain. I will check an EKG. This shows sinus tachycardia. I will check labs. I will follow-up with the results. If symptoms persist she may need to be seen by the cardiologist.    COPD: Patient has COPD. She is on albuterol and Anoro Ellipta. She denies fever or chills. Has an occasional cough. No hemoptysis. She also does have occasional wheeze for which she is taking inhaler medication. She will continue with these. Sinusitis: Patient has a history of chronic sinusitis. She currently has frontal and maxillary sinus pressure and postnasal drainage that is mucopurulent in nature. In the past she has used Amoxil with good result. Would like to have some of this medication. Prescription is sent in. She should continue using the Flonase and an antihistamine. Chronic constipation: Patient has chronic constipation and has been seen by the gastroenterologist.  She takes Trulance 3 mg daily. This keeps her bowels regular. She denies abdominal pain, nausea or vomiting. Mood disorder: Patient has a history of anxiety and depression. She is on Paxil and trazodone. He has hydroxyzine to use for acute anxiety. She has been getting breakthrough anxiety lately. She recently took her mother to a nursing home as she has had a stroke and patient was unable to care for her. This triggers anxiety symptoms at times. We discussed relaxation techniques. PMS: Patient has menopausal symptoms with hot flashes that come on and off. She is on Paxil. This has helped with the symptoms. GERD: Patient has gastroesophageal reflux disease. She gets heartburn.   Denies dark stools or hematemesis. She is on Prilosec. She will continue with this medication. Insomnia: Patient has insomnia. I have put her on trazodone. Occasionally this medication does not help much and on other days she is able to sleep comfortably. Discussed good sleep hygiene techniques. She will intensify this and I will follow-up at next visit. Dyslipidemia: Patient has dyslipidemia. She is on Lipitor 10 mg daily. I will recheck lipid panel. Hypovitaminosis D: Patient takes vitamin D replacement therapy. I will recheck vitamin D levels. Past Medical History  Past Medical History:   Diagnosis Date    Acid reflux     Anemia     COPD (chronic obstructive pulmonary disease) (HCC)     Cystic disease of kidney     GERD (gastroesophageal reflux disease)     Hemorrhoid     History of blood transfusion     HTN (hypertension)     Hyperlipidemia     Hypertension     Renal cyst     Thyroid disease        Surgical History  Past Surgical History:   Procedure Laterality Date    HX COLONOSCOPY      HX ENDOSCOPY      HX HEENT  2011    Partial thyroid    HX HYSTERECTOMY      HX ORTHOPAEDIC  2005    left shoulder sugery    HX PARTIAL HYSTERECTOMY  1990    HX THYROIDECTOMY          Medications  Current Outpatient Medications   Medication Sig Dispense Refill    Trulance 3 mg tab Take 3 mg by mouth daily.  albuterol (PROVENTIL) 5 mg/mL nebulizer solution Take 2.5 mg by inhalation two (2) times a day.  PARoxetine (PAXIL) 10 mg tablet Take 10 mg by mouth daily.       fluticasone propionate (FLONASE) 50 mcg/actuation nasal spray Use 2 spray(s) in each nostril once daily 16 g 0    atorvastatin (LIPITOR) 10 mg tablet Take 1 tablet by mouth once daily 90 Tab 0    hydrOXYzine HCL (ATARAX) 25 mg tablet TAKE 1 TABLET BY MOUTH THREE TIMES DAILY AS NEEDED FOR ANXIETY 60 Tab 0    hydroCHLOROthiazide (HYDRODIURIL) 12.5 mg tablet Take 1 tablet by mouth once daily 90 Tab 0    ergocalciferol (ERGOCALCIFEROL) 1,250 mcg (50,000 unit) capsule TAKE 1 CAPSULE BY MOUTH ONCE A WEEK (EVERY  7  DAYS) 13 Cap 0    PARoxetine (PAXIL) 10 mg tablet Take 1 tablet by mouth once daily 30 Tab 2    traZODone (DESYREL) 50 mg tablet Take 1 tablet by mouth nightly 30 Tab 2    amLODIPine (NORVASC) 10 mg tablet Take 1 tablet by mouth once daily 90 Tab 0    umeclidinium-vilanterol (ANORO ELLIPTA) 62.5-25 mcg/actuation inhaler Take 1 Puff by inhalation daily.  aspirin delayed-release 81 mg tablet 81 mg.      albuterol (PROVENTIL HFA, VENTOLIN HFA, PROAIR HFA) 90 mcg/actuation inhaler Take  by inhalation.  omeprazole (PRILOSEC) 20 mg capsule Take 20 mg by mouth daily.          Allergies  Allergies   Allergen Reactions    Latex, Natural Rubber Rash       Family History  Family History   Problem Relation Age of Onset    Diabetes Mother     Hypertension Mother     Cancer Father     Hypertension Father        Social History  Social History     Socioeconomic History    Marital status: SINGLE     Spouse name: Not on file    Number of children: Not on file    Years of education: Not on file    Highest education level: Not on file   Occupational History    Not on file   Social Needs    Financial resource strain: Not on file    Food insecurity     Worry: Not on file     Inability: Not on file    Transportation needs     Medical: Not on file     Non-medical: Not on file   Tobacco Use    Smoking status: Former Smoker     Packs/day: 0.30     Years: 27.00     Pack years: 8.10     Types: Cigarettes    Smokeless tobacco: Never Used   Substance and Sexual Activity    Alcohol use: No     Alcohol/week: 0.0 standard drinks    Drug use: No    Sexual activity: Never   Lifestyle    Physical activity     Days per week: Not on file     Minutes per session: Not on file    Stress: Not on file   Relationships    Social connections     Talks on phone: Not on file     Gets together: Not on file     Attends [employer] service: Not on file Active member of club or organization: Not on file     Attends meetings of clubs or organizations: Not on file     Relationship status: Not on file    Intimate partner violence     Fear of current or ex partner: Not on file     Emotionally abused: Not on file     Physically abused: Not on file     Forced sexual activity: Not on file   Other Topics Concern    Not on file   Social History Narrative    ** Merged History Encounter **            Review of Systems  Review of Systems - Review of all systems is negative except as noted above in the HPI.     Vital Signs  Visit Vitals  /74 (BP 1 Location: Left upper arm, BP Patient Position: Sitting, BP Cuff Size: Adult)   Pulse (!) 106   Temp (!) 92 °F (33.3 °C) (Oral)   Resp 16   Ht 5' 2\" (1.575 m)   Wt 162 lb (73.5 kg)   LMP 05/08/1981   SpO2 98%   BMI 29.63 kg/m²         Physical Exam  Physical Examination: General appearance - alert, well appearing, and in no distress, oriented to person, place, and time, acyanotic, in no respiratory distress and well hydrated  Mental status - alert, oriented to person, place, and time, affect appropriate to mood  Mouth - mucous membranes moist, pharynx normal without lesions  Neck - supple, no significant adenopathy  Lymphatics - no palpable lymphadenopathy, no hepatosplenomegaly  Chest - clear to auscultation, no wheezes, rales or rhonchi, symmetric air entry, no tachypnea, retractions or cyanosis  Heart - S1 and S2 normal  Back exam - limited range of motion  Neurological - motor and sensory grossly normal bilaterally  Musculoskeletal - osteoarthritic changes noted in both hands  Extremities - intact peripheral pulses      Results  Results for orders placed or performed during the hospital encounter of 07/09/20   TSH 3RD GENERATION   Result Value Ref Range    TSH 0.80 0.36 - 3.74 uIU/mL   CBC WITH AUTOMATED DIFF   Result Value Ref Range    WBC 7.7 4.6 - 13.2 K/uL    RBC 4.73 4.20 - 5.30 M/uL    HGB 13.6 12.0 - 16.0 g/dL    HCT 41.9 35.0 - 45.0 %    MCV 88.6 74.0 - 97.0 FL    MCH 28.8 24.0 - 34.0 PG    MCHC 32.5 31.0 - 37.0 g/dL    RDW 14.4 11.6 - 14.5 %    PLATELET 017 100 - 320 K/uL    MPV 10.3 9.2 - 11.8 FL    NEUTROPHILS 57 40 - 73 %    LYMPHOCYTES 30 21 - 52 %    MONOCYTES 7 3 - 10 %    EOSINOPHILS 5 0 - 5 %    BASOPHILS 1 0 - 2 %    ABS. NEUTROPHILS 4.4 1.8 - 8.0 K/UL    ABS. LYMPHOCYTES 2.3 0.9 - 3.6 K/UL    ABS. MONOCYTES 0.5 0.05 - 1.2 K/UL    ABS. EOSINOPHILS 0.4 0.0 - 0.4 K/UL    ABS. BASOPHILS 0.1 0.0 - 0.1 K/UL    DF AUTOMATED     METABOLIC PANEL, COMPREHENSIVE   Result Value Ref Range    Sodium 139 136 - 145 mmol/L    Potassium 4.0 3.5 - 5.5 mmol/L    Chloride 104 100 - 111 mmol/L    CO2 31 21 - 32 mmol/L    Anion gap 4 3.0 - 18 mmol/L    Glucose 99 74 - 99 mg/dL    BUN 20 (H) 7.0 - 18 MG/DL    Creatinine 0.98 0.6 - 1.3 MG/DL    BUN/Creatinine ratio 20 12 - 20      GFR est AA >60 >60 ml/min/1.73m2    GFR est non-AA 57 (L) >60 ml/min/1.73m2    Calcium 9.3 8.5 - 10.1 MG/DL    Bilirubin, total 0.7 0.2 - 1.0 MG/DL    ALT (SGPT) 29 13 - 56 U/L    AST (SGOT) 17 10 - 38 U/L    Alk. phosphatase 82 45 - 117 U/L    Protein, total 7.7 6.4 - 8.2 g/dL    Albumin 4.3 3.4 - 5.0 g/dL    Globulin 3.4 2.0 - 4.0 g/dL    A-G Ratio 1.3 0.8 - 1.7     VITAMIN D, 25 HYDROXY   Result Value Ref Range    Vitamin D 25-Hydroxy 76.9 30 - 100 ng/mL       ASSESSMENT and PLAN    ICD-10-CM ICD-9-CM    1. Cough  R05 786.2 XR CHEST PA LAT   2. Chronic obstructive pulmonary disease with acute exacerbation (HCC)  J44.1 491.21    3. Palpitations  R00.2 785.1 AMB POC EKG ROUTINE W/ 12 LEADS, INTER & REP      TSH 3RD GENERATION      MAGNESIUM   4. Essential hypertension  T29 806.7 METABOLIC PANEL, COMPREHENSIVE      CBC WITH AUTOMATED DIFF   5. Hypovitaminosis D  E55.9 268.9 VITAMIN D, 25 HYDROXY   6. Anxiety  F41.9 300.00 TSH 3RD GENERATION   7. Dyslipidemia  E78.5 272.4 LIPID PANEL   8.  Sinusitis, unspecified chronicity, unspecified location  J32.9 473.9 amoxicillin (AMOXIL) 500 mg capsule   9. Insomnia, unspecified type  G47.00 780.52    10. Menopausal and female climacteric states  N95.1 627.2    11. Thyroid disorder  E07.9 246.9 TSH 3RD GENERATION     lab results and schedule of future lab studies reviewed with patient  reviewed diet, exercise and weight control  cardiovascular risk and specific lipid/LDL goals reviewed  reviewed medications and side effects in detail  use of aspirin to prevent MI and TIA's discussed  radiology results and schedule of future radiology studies reviewed with patient      I have discussed the diagnosis with the patient and the intended plan of care as seen in the above orders. The patient has received an after-visit summary and questions were answered concerning future plans. I have discussed medication, side effects, and warnings with the patient in detail. The patient verbalized understanding and is in agreement with the plan of care. The patient will contact the office with any additional concerns. I spent at least 43 minutes on this visit with this established patient. Spring Delacruz MD    PLEASE NOTE:   This document has been produced using voice recognition software.  Unrecognized errors in transcription may be present

## 2021-04-20 DIAGNOSIS — F41.9 ANXIETY: ICD-10-CM

## 2021-04-20 RX ORDER — HYDROXYZINE 25 MG/1
TABLET, FILM COATED ORAL
Qty: 60 TAB | Refills: 0 | Status: SHIPPED | OUTPATIENT
Start: 2021-04-20 | End: 2021-05-24

## 2021-04-27 ENCOUNTER — HOSPITAL ENCOUNTER (OUTPATIENT)
Dept: LAB | Age: 66
Discharge: HOME | End: 2021-04-27
Payer: MEDICARE

## 2021-04-27 DIAGNOSIS — Z01.89 ENCOUNTER FOR LABORATORY TEST: Primary | ICD-10-CM

## 2021-04-27 DIAGNOSIS — R00.2 PALPITATIONS: ICD-10-CM

## 2021-04-27 DIAGNOSIS — I10 ESSENTIAL HYPERTENSION: ICD-10-CM

## 2021-04-27 DIAGNOSIS — F41.9 ANXIETY: ICD-10-CM

## 2021-04-27 DIAGNOSIS — E07.9 THYROID DISORDER: ICD-10-CM

## 2021-04-27 DIAGNOSIS — E55.9 HYPOVITAMINOSIS D: ICD-10-CM

## 2021-04-27 DIAGNOSIS — E78.5 DYSLIPIDEMIA: ICD-10-CM

## 2021-04-27 LAB
25(OH)D3 SERPL-MCNC: 102.9 NG/ML (ref 30–100)
ALBUMIN SERPL-MCNC: 4.3 G/DL (ref 3.4–5)
ALBUMIN/GLOB SERPL: 1.3 {RATIO} (ref 0.8–1.7)
ALP SERPL-CCNC: 81 U/L (ref 45–117)
ALT SERPL-CCNC: 25 U/L (ref 13–56)
ANION GAP SERPL CALC-SCNC: 5 MMOL/L (ref 3–18)
AST SERPL-CCNC: 20 U/L (ref 10–38)
BASOPHILS # BLD: 0.1 K/UL (ref 0–0.1)
BASOPHILS NFR BLD: 2 % (ref 0–2)
BILIRUB SERPL-MCNC: 0.5 MG/DL (ref 0.2–1)
BUN SERPL-MCNC: 14 MG/DL (ref 7–18)
BUN/CREAT SERPL: 17 (ref 12–20)
CALCIUM SERPL-MCNC: 9.6 MG/DL (ref 8.5–10.1)
CHLORIDE SERPL-SCNC: 102 MMOL/L (ref 100–111)
CHOLEST SERPL-MCNC: 143 MG/DL
CO2 SERPL-SCNC: 31 MMOL/L (ref 21–32)
CREAT SERPL-MCNC: 0.82 MG/DL (ref 0.6–1.3)
DIFFERENTIAL METHOD BLD: NORMAL
EOSINOPHIL # BLD: 0.3 K/UL (ref 0–0.4)
EOSINOPHIL NFR BLD: 4 % (ref 0–5)
ERYTHROCYTE [DISTWIDTH] IN BLOOD BY AUTOMATED COUNT: 13.5 % (ref 11.6–14.5)
GLOBULIN SER CALC-MCNC: 3.2 G/DL (ref 2–4)
GLUCOSE SERPL-MCNC: 80 MG/DL (ref 74–99)
HCT VFR BLD AUTO: 41.6 % (ref 35–45)
HDLC SERPL-MCNC: 45 MG/DL (ref 40–60)
HDLC SERPL: 3.2 {RATIO} (ref 0–5)
HGB BLD-MCNC: 12.9 G/DL (ref 12–16)
LDLC SERPL CALC-MCNC: 84 MG/DL (ref 0–100)
LIPID PROFILE,FLP: NORMAL
LYMPHOCYTES # BLD: 2 K/UL (ref 0.9–3.6)
LYMPHOCYTES NFR BLD: 27 % (ref 21–52)
MAGNESIUM SERPL-MCNC: 1.9 MG/DL (ref 1.6–2.6)
MCH RBC QN AUTO: 27.6 PG (ref 24–34)
MCHC RBC AUTO-ENTMCNC: 31 G/DL (ref 31–37)
MCV RBC AUTO: 89.1 FL (ref 74–97)
MONOCYTES # BLD: 0.6 K/UL (ref 0.05–1.2)
MONOCYTES NFR BLD: 9 % (ref 3–10)
NEUTS SEG # BLD: 4.4 K/UL (ref 1.8–8)
NEUTS SEG NFR BLD: 59 % (ref 40–73)
PLATELET # BLD AUTO: 384 K/UL (ref 135–420)
PMV BLD AUTO: 9.9 FL (ref 9.2–11.8)
POTASSIUM SERPL-SCNC: 4.5 MMOL/L (ref 3.5–5.5)
PROT SERPL-MCNC: 7.5 G/DL (ref 6.4–8.2)
RBC # BLD AUTO: 4.67 M/UL (ref 4.2–5.3)
SODIUM SERPL-SCNC: 138 MMOL/L (ref 136–145)
TRIGL SERPL-MCNC: 70 MG/DL (ref ?–150)
TSH SERPL DL<=0.05 MIU/L-ACNC: 0.95 UIU/ML (ref 0.36–3.74)
VLDLC SERPL CALC-MCNC: 14 MG/DL
WBC # BLD AUTO: 7.5 K/UL (ref 4.6–13.2)

## 2021-04-27 PROCEDURE — 83735 ASSAY OF MAGNESIUM: CPT

## 2021-04-27 PROCEDURE — 36415 COLL VENOUS BLD VENIPUNCTURE: CPT | Performed by: FAMILY MEDICINE

## 2021-04-27 PROCEDURE — 85025 COMPLETE CBC W/AUTO DIFF WBC: CPT

## 2021-04-27 PROCEDURE — 84443 ASSAY THYROID STIM HORMONE: CPT

## 2021-04-27 PROCEDURE — 80061 LIPID PANEL: CPT

## 2021-04-27 PROCEDURE — 82306 VITAMIN D 25 HYDROXY: CPT

## 2021-04-27 PROCEDURE — 80053 COMPREHEN METABOLIC PANEL: CPT

## 2021-04-27 NOTE — PROGRESS NOTES
Patient here today for lab and xray. Venipunture to patient left forearm. Patient tolerated well. No concerns noted at this time. Labs ordered by PCP at this time

## 2021-05-03 NOTE — PROGRESS NOTES
Spoke with patient at this time regarding results. Patient verbalized understanding at this time. No other concerns noted at this time

## 2021-05-03 NOTE — PROGRESS NOTES
Please let patient know vitamin D level now is slightly elevated. She should discontinue taking the vitamin D replacement therapy. Rest of her labs are stable.   Kathy Perrin MD

## 2021-05-05 RX ORDER — FLUTICASONE PROPIONATE 50 MCG
SPRAY, SUSPENSION (ML) NASAL
Qty: 16 G | Refills: 0 | Status: SHIPPED | OUTPATIENT
Start: 2021-05-05 | End: 2021-05-27 | Stop reason: SDUPTHER

## 2021-05-07 ENCOUNTER — TRANSCRIBE ORDER (OUTPATIENT)
Dept: REGISTRATION | Age: 66
End: 2021-05-07

## 2021-05-07 ENCOUNTER — HOSPITAL ENCOUNTER (OUTPATIENT)
Dept: PREADMISSION TESTING | Age: 66
Discharge: HOME | End: 2021-05-07
Payer: MEDICARE

## 2021-05-07 DIAGNOSIS — Z20.828 EXPOSURE TO SARS-ASSOCIATED CORONAVIRUS: ICD-10-CM

## 2021-05-07 DIAGNOSIS — Z01.812 BLOOD TESTS PRIOR TO TREATMENT OR PROCEDURE: ICD-10-CM

## 2021-05-07 DIAGNOSIS — Z01.812 BLOOD TESTS PRIOR TO TREATMENT OR PROCEDURE: Primary | ICD-10-CM

## 2021-05-07 PROCEDURE — U0003 INFECTIOUS AGENT DETECTION BY NUCLEIC ACID (DNA OR RNA); SEVERE ACUTE RESPIRATORY SYNDROME CORONAVIRUS 2 (SARS-COV-2) (CORONAVIRUS DISEASE [COVID-19]), AMPLIFIED PROBE TECHNIQUE, MAKING USE OF HIGH THROUGHPUT TECHNOLOGIES AS DESCRIBED BY CMS-2020-01-R: HCPCS

## 2021-05-08 LAB — SARS-COV-2, COV2NT: NOT DETECTED

## 2021-05-23 DIAGNOSIS — F41.9 ANXIETY: ICD-10-CM

## 2021-05-24 RX ORDER — HYDROXYZINE 25 MG/1
TABLET, FILM COATED ORAL
Qty: 60 TABLET | Refills: 0 | Status: SHIPPED | OUTPATIENT
Start: 2021-05-24 | End: 2021-06-24

## 2021-05-27 ENCOUNTER — OFFICE VISIT (OUTPATIENT)
Dept: FAMILY MEDICINE CLINIC | Age: 66
End: 2021-05-27
Payer: MEDICARE

## 2021-05-27 VITALS
HEIGHT: 62 IN | SYSTOLIC BLOOD PRESSURE: 121 MMHG | TEMPERATURE: 97.6 F | WEIGHT: 161 LBS | DIASTOLIC BLOOD PRESSURE: 69 MMHG | HEART RATE: 95 BPM | OXYGEN SATURATION: 96 % | RESPIRATION RATE: 18 BRPM | BODY MASS INDEX: 29.63 KG/M2

## 2021-05-27 DIAGNOSIS — J44.1 CHRONIC OBSTRUCTIVE PULMONARY DISEASE WITH ACUTE EXACERBATION (HCC): ICD-10-CM

## 2021-05-27 DIAGNOSIS — Z71.89 ADVANCED DIRECTIVES, COUNSELING/DISCUSSION: ICD-10-CM

## 2021-05-27 DIAGNOSIS — E78.5 DYSLIPIDEMIA: ICD-10-CM

## 2021-05-27 DIAGNOSIS — J32.9 SINUSITIS, UNSPECIFIED CHRONICITY, UNSPECIFIED LOCATION: Primary | ICD-10-CM

## 2021-05-27 DIAGNOSIS — Z13.31 SCREENING FOR DEPRESSION: ICD-10-CM

## 2021-05-27 DIAGNOSIS — I10 ESSENTIAL HYPERTENSION: ICD-10-CM

## 2021-05-27 DIAGNOSIS — Z00.00 MEDICARE ANNUAL WELLNESS VISIT, SUBSEQUENT: ICD-10-CM

## 2021-05-27 DIAGNOSIS — G47.00 INSOMNIA, UNSPECIFIED TYPE: ICD-10-CM

## 2021-05-27 DIAGNOSIS — N95.1 MENOPAUSAL AND FEMALE CLIMACTERIC STATES: ICD-10-CM

## 2021-05-27 PROCEDURE — G8752 SYS BP LESS 140: HCPCS | Performed by: FAMILY MEDICINE

## 2021-05-27 PROCEDURE — G8510 SCR DEP NEG, NO PLAN REQD: HCPCS | Performed by: FAMILY MEDICINE

## 2021-05-27 PROCEDURE — G8417 CALC BMI ABV UP PARAM F/U: HCPCS | Performed by: FAMILY MEDICINE

## 2021-05-27 PROCEDURE — G8427 DOCREV CUR MEDS BY ELIG CLIN: HCPCS | Performed by: FAMILY MEDICINE

## 2021-05-27 PROCEDURE — G8754 DIAS BP LESS 90: HCPCS | Performed by: FAMILY MEDICINE

## 2021-05-27 PROCEDURE — 99214 OFFICE O/P EST MOD 30 MIN: CPT | Performed by: FAMILY MEDICINE

## 2021-05-27 PROCEDURE — G8536 NO DOC ELDER MAL SCRN: HCPCS | Performed by: FAMILY MEDICINE

## 2021-05-27 PROCEDURE — 1101F PT FALLS ASSESS-DOCD LE1/YR: CPT | Performed by: FAMILY MEDICINE

## 2021-05-27 PROCEDURE — 3017F COLORECTAL CA SCREEN DOC REV: CPT | Performed by: FAMILY MEDICINE

## 2021-05-27 PROCEDURE — 99497 ADVNCD CARE PLAN 30 MIN: CPT | Performed by: FAMILY MEDICINE

## 2021-05-27 PROCEDURE — 1090F PRES/ABSN URINE INCON ASSESS: CPT | Performed by: FAMILY MEDICINE

## 2021-05-27 PROCEDURE — G0439 PPPS, SUBSEQ VISIT: HCPCS | Performed by: FAMILY MEDICINE

## 2021-05-27 PROCEDURE — G8399 PT W/DXA RESULTS DOCUMENT: HCPCS | Performed by: FAMILY MEDICINE

## 2021-05-27 RX ORDER — TRAZODONE HYDROCHLORIDE 50 MG/1
TABLET ORAL
Qty: 30 TABLET | Refills: 2 | Status: SHIPPED | OUTPATIENT
Start: 2021-05-27 | End: 2022-08-17

## 2021-05-27 RX ORDER — ATORVASTATIN CALCIUM 10 MG/1
TABLET, FILM COATED ORAL
Qty: 90 TABLET | Refills: 1 | Status: SHIPPED | OUTPATIENT
Start: 2021-05-27 | End: 2021-08-29

## 2021-05-27 RX ORDER — FLUTICASONE PROPIONATE 50 MCG
SPRAY, SUSPENSION (ML) NASAL
Qty: 16 G | Refills: 0 | Status: SHIPPED | OUTPATIENT
Start: 2021-05-27 | End: 2021-07-28

## 2021-05-27 RX ORDER — PAROXETINE 10 MG/1
10 TABLET, FILM COATED ORAL DAILY
Qty: 90 TABLET | Refills: 1 | Status: SHIPPED | OUTPATIENT
Start: 2021-05-27 | End: 2021-11-30 | Stop reason: SDUPTHER

## 2021-05-27 NOTE — ACP (ADVANCE CARE PLANNING)
Advance Care Planning     Advance Care Planning (ACP) Physician/NP/PA Conversation      Date of Conversation: 5/27/2021  Conducted with: Patient with Decision Making Capacity    Healthcare Decision Maker:     Primary Decision Maker (Active): Nolan Meek Other Relative - [phone]  Click here to complete [address] including selection of the Healthcare Decision Maker Relationship (ie \"Primary\")  Today we documented Decision Maker(s). The patient will provide ACP documents. Care Preferences:    Hospitalization: \"If your health worsens and it becomes clear that your chance of recovery is unlikely, what would be your preference regarding hospitalization? \"  The patient would prefer hospitalization. Ventilation: \"If you were unable to breathe on your own and your chance of recovery was unlikely, what would be your preference about the use of a ventilator (breathing machine) if it was available to you? \"   The patient would desire the use of a ventilator. Resuscitation: \"In the event your heart stopped as a result of an underlying serious health condition, would you want attempts to be made to restart your heart, or would you prefer a natural death? \"   Yes, attempt to resuscitate.     Additional topics discussed: treatment goals, ventilation preferences, hospitalization preferences and resuscitation preferences    Conversation Outcomes / Follow-Up Plan:   ACP in process - completing/providing documents   Reviewed DNR/DNI and patient elects Full Code (Attempt Resuscitation)     Length of Voluntary ACP Conversation in minutes:  16 minutes    Hesed Lennox Few, MD

## 2021-05-27 NOTE — PROGRESS NOTES
Chief Complaint   Patient presents with    Annual Wellness Visit     sinus concern     1. Have you been to the ER, urgent care clinic since your last visit? Hospitalized since your last visit? No    2. Have you seen or consulted any other health care providers outside of the [address] [place] [address] since your last visit? Include any pap smears or colon screening. No  This is the Subsequent Medicare Annual Wellness Exam, performed 12 months or more after the Initial AWV or the last Subsequent AWV    I have reviewed the patient's medical history in detail and updated the computerized patient record. Assessment/Plan   Education and counseling provided:  Are appropriate based on today's review and evaluation    1. Medicare annual wellness visit, subsequent    2. Advanced directives, counseling/discussion  - FULL CODE    3. Screening for depression  - DEPRESSION SCREEN ANNUAL  - [place] DEPRESSION SCRE[place] ANNUAL       Depression Risk Factor Screening     3 most recent PHQ Screens 5/27/2021   Little interest or pleasure in doing things Not at all   Feeling down, depressed, irritable, or hopeless Not at all   Total Score PHQ 2 0   Trouble falling or staying asleep, or sleeping too much Not at all   Feeling tired or having little energy Not at all   Poor appetite, weight loss, or overeating Not at all   Feeling bad about yourself - or that you are a failure or have let yourself or your family down Not at all   Trouble concentrating on things such as school, work, reading, or watching TV Not at all   Moving or speaking so slowly that other people could have noticed; or the opposite being so fidgety that others notice Not at all   Thoughts of being better off dead, or hurting yourself in some way Not at all   PHQ 9 Score 0   How difficult have these problems made it for you to do your work, take care of your home and get along with others Not difficult at all   8 minutes taken on depression screening.     Alcohol Risk Screen    Do you average more than 1 drink per night or more than 7 drinks a week:  No    On any one occasion in the past three months have you have had more than 3 drinks containing alcohol:  No        Functional Ability and Level of Safety     1. Was the patient's timed Up and GO test unsteady or longer than 30 seconds? No  2. Does the patient need help with the phone, transportation, shopping, preparing meals, housework, laundry, medications or managing money? No  3. Does the patients' home have rugs in the hallway, lack grab bars in the bathroom, lack handrails on the stairs or have poor lighting? No  4. Have you noticed any hearing difficulties? No  Hearing Evaluation:    Hearing: Hearing is good. Activities of Daily Living: The home contains: no safety equipment. Patient does total self care      Ambulation: with no difficulty     Fall Risk:  Fall Risk Assessment, last 12 mths 5/27/2021   Able to walk? Yes   Fall in past 12 months? 0   Do you feel unsteady?  0   Are you worried about falling 0      Abuse Screen:  Patient is not abused       Cognitive Screening    Has your family/caregiver stated any concerns about your memory: no     Cognitive Screening: Normal - MMSE (Mini Mental Status Exam)    Health Maintenance Due     Health Maintenance Due   Topic Date Due    COVID-19 Vaccine (1) Never done    DTaP/Tdap/Td series (1 - Tdap) Never done    Medicare Yearly Exam  02/05/2021       Patient Care Team   Patient Care Team:  Marshall Galloway MD as PCP - General (Family Medicine)  Marshall Galloway MD as PCP - [facility] Empaneled Provider  Addi Little MD (Family Medicine)  Elian Funes MD (Surgery)  Hazle Siemens, PA-C (Physician Assistant)  Verna Art MD (Ophthalmology)  Georgi Howell MD (Pulmonary Disease)  Crissy Rico MD (Gastroenterology)    History   Annette Shaw comes in for Medicare wellness exam.    Patient Active Problem List   Diagnosis Code    Vomiting R11.10    Hemorrhoids K64.9    Back pain M54.9    GERD (gastroesophageal reflux disease) K21.9    H/O thyroid nodule Z86.39    Renal cyst, left N28.1    Tobacco abuse Z72.0    Hypovitaminosis D E55.9    Dyslipidemia E78.5    Acute exacerbation of chronic obstructive pulmonary disease (HCC) J44.1    Community acquired pneumonia of left lower lobe of lung J18.9    Ventral hernia without obstruction or gangrene K43.9     Past Medical History:   Diagnosis Date    Acid reflux     Anemia     COPD (chronic obstructive pulmonary disease) (HCC)     Cystic disease of kidney     GERD (gastroesophageal reflux disease)     Hemorrhoid     History of blood transfusion     HTN (hypertension)     Hyperlipidemia     Hypertension     Palpitations     Renal cyst     Thyroid disease     no meds left thyroidectomy      Past Surgical History:   Procedure Laterality Date    HX COLONOSCOPY      HX ENDOSCOPY      HX HEENT  2011    Partial thyroid    HX HYSTERECTOMY      HX ORTHOPAEDIC  2005    left shoulder sugery    HX PARTIAL HYSTERECTOMY  1990    HX THYROIDECTOMY Left      Current Outpatient Medications   Medication Sig Dispense Refill    Oxygen 2LPM nc HS      hydrOXYzine HCL (ATARAX) 25 mg tablet TAKE 1 TABLET BY MOUTH THREE TIMES DAILY AS NEEDED FOR ANXIETY 60 Tablet 0    fluticasone propionate (FLONASE) 50 mcg/actuation nasal spray Use 2 spray(s) in each nostril once daily 16 g 0    Trulance 3 mg tab Take 3 mg by mouth daily.  albuterol (PROVENTIL) 5 mg/mL nebulizer solution Take 2.5 mg by inhalation two (2) times a day.  PARoxetine (PAXIL) 10 mg tablet Take 10 mg by mouth daily.       atorvastatin (LIPITOR) 10 mg tablet Take 1 tablet by mouth once daily 90 Tab 0    hydroCHLOROthiazide (HYDRODIURIL) 12.5 mg tablet Take 1 tablet by mouth once daily 90 Tab 0    traZODone (DESYREL) 50 mg tablet Take 1 tablet by mouth nightly 30 Tab 2    umeclidinium-vilanterol (ANORO ELLIPTA) 62.5-25 mcg/actuation inhaler Take 1 Puff by inhalation daily.  aspirin delayed-release 81 mg tablet 81 mg.      albuterol (PROVENTIL HFA, VENTOLIN HFA, PROAIR HFA) 90 mcg/actuation inhaler Take  by inhalation.  omeprazole (PRILOSEC) 20 mg capsule Take 20 mg by mouth daily. Allergies   Allergen Reactions    Latex, Natural Rubber Rash       Family History   Problem Relation Age of Onset    Diabetes Mother     Hypertension Mother     Cancer Father     Hypertension Father      Social History     Tobacco Use    Smoking status: Former Smoker     Packs/day: 0.30     Years: 27.00     Pack years: 8.10     Types: Cigarettes    Smokeless tobacco: Never Used   Substance Use Topics    Alcohol use: Yes     Alcohol/week: 0.0 standard drinks     Comment: rarely     I have discussed the diagnosis with the patient and the intended plan of care as seen in the above orders. The patient has received an after-visit summary and questions were answered concerning future plans. I have discussed medication, side effects, and warnings with the patient in detail. The patient verbalized understanding and is in agreement with the plan of care. The patient will contact the office with any additional concerns. Personalized preventative plan of care was discussed, printed and given to patient.     Zabrina Zee MD

## 2021-05-27 NOTE — PATIENT INSTRUCTIONS
Medicare Wellness Visit, Female     The best way to live healthy is to have a lifestyle where you eat a well-balanced diet, exercise regularly, limit alcohol use, and quit all forms of tobacco/nicotine, if applicable. Regular preventive services are another way to keep healthy. Preventive services (vaccines, screening tests, monitoring & exams) can help personalize your care plan, which helps you manage your own care. Screening tests can find health problems at the earliest stages, when they are easiest to treat. [facility] follows the current, evidence-based guidelines published by the [facility] ([facility]) when recommending preventive services for our patients. Because we follow these guidelines, sometimes recommendations change over time as research supports it. (For example, mammograms used to be recommended annually. Even though Medicare will still pay for an annual mammogram, the newer guidelines recommend a mammogram every two years for women of average risk). Of course, you and your doctor may decide to screen more often for some diseases, based on your risk and your co-morbidities (chronic disease you are already diagnosed with). Preventive services for you include:  - Medicare offers their members a free annual wellness visit, which is time for you and your primary care provider to discuss and plan for your preventive service needs. Take advantage of this benefit every year!  -All adults over the age of 72 should receive the recommended pneumonia vaccines. Current USPSTF guidelines recommend a series of two vaccines for the best pneumonia protection.   -All adults should have a flu vaccine yearly and a tetanus vaccine every 10 years.   -All adults age 48 and older should receive the shingles vaccines (series of two vaccines).       -All adults age 38-68 who are overweight should have a diabetes screening test once every three years.   -All adults born between [birth year] and 1965 should be screened once for Hepatitis C.  -Other screening tests and preventive services for persons with diabetes include: an eye exam to screen for diabetic retinopathy, a kidney function test, a foot exam, and stricter control over your cholesterol.   -Cardiovascular screening for adults with routine risk involves an electrocardiogram (ECG) at intervals determined by your doctor.   -Colorectal cancer screenings should be done for adults age 54-65 with no increased risk factors for colorectal cancer. There are a number of acceptable methods of screening for this type of cancer. Each test has its own benefits and drawbacks. Discuss with your doctor what is most appropriate for you during your annual wellness visit. The different tests include: colonoscopy (considered the best screening method), a fecal occult blood test, a fecal DNA test, and sigmoidoscopy.    -A bone mass density test is recommended when a woman turns 65 to screen for osteoporosis. This test is only recommended one time, as a screening. Some providers will use this same test as a disease monitoring tool if you already have osteoporosis. -Breast cancer screenings are recommended every other year for women of normal risk, age 54-69.  -Cervical cancer screenings for women over age 72 are only recommended with certain risk factors. Here is a list of your current Health Maintenance items (your personalized list of preventive services) with a due date:  Health Maintenance Due   Topic Date Due    COVID-19 Vaccine (1) Never done    DTaP/Tdap/Td  (1 - Tdap) Never done    Annual Well Visit  02/05/2021         Medicare Wellness Visit, Female     The best way to live healthy is to have a lifestyle where you eat a well-balanced diet, exercise regularly, limit alcohol use, and quit all forms of tobacco/nicotine, if applicable. Regular preventive services are another way to keep healthy.  Preventive services (vaccines, screening tests, monitoring & exams) can help personalize your care plan, which helps you manage your own care. Screening tests can find health problems at the earliest stages, when they are easiest to treat. [facility] follows the current, evidence-based guidelines published by the [facility] ([facility]) when recommending preventive services for our patients. Because we follow these guidelines, sometimes recommendations change over time as research supports it. (For example, mammograms used to be recommended annually. Even though Medicare will still pay for an annual mammogram, the newer guidelines recommend a mammogram every two years for women of average risk). Of course, you and your doctor may decide to screen more often for some diseases, based on your risk and your co-morbidities (chronic disease you are already diagnosed with). Preventive services for you include:  - Medicare offers their members a free annual wellness visit, which is time for you and your primary care provider to discuss and plan for your preventive service needs. Take advantage of this benefit every year!  -All adults over the age of 72 should receive the recommended pneumonia vaccines. Current USPSTF guidelines recommend a series of two vaccines for the best pneumonia protection.   -All adults should have a flu vaccine yearly and a tetanus vaccine every 10 years.   -All adults age 48 and older should receive the shingles vaccines (series of two vaccines).       -All adults age 38-68 who are overweight should have a diabetes screening test once every three years.   -All adults born between 80 and 1965 should be screened once for Hepatitis C.  -Other screening tests and preventive services for persons with diabetes include: an eye exam to screen for diabetic retinopathy, a kidney function test, a foot exam, and stricter control over your cholesterol.   -Cardiovascular screening for adults with routine risk involves an electrocardiogram (ECG) at intervals determined by your doctor.   -Colorectal cancer screenings should be done for adults age 54-65 with no increased risk factors for colorectal cancer. There are a number of acceptable methods of screening for this type of cancer. Each test has its own benefits and drawbacks. Discuss with your doctor what is most appropriate for you during your annual wellness visit. The different tests include: colonoscopy (considered the best screening method), a fecal occult blood test, a fecal DNA test, and sigmoidoscopy.    -A bone mass density test is recommended when a woman turns 65 to screen for osteoporosis. This test is only recommended one time, as a screening. Some providers will use this same test as a disease monitoring tool if you already have osteoporosis. -Breast cancer screenings are recommended every other year for women of normal risk, age 54-69.  -Cervical cancer screenings for women over age 72 are only recommended with certain risk factors.      Here is a list of your current Health Maintenance items (your personalized list of preventive services) with a due date:  Health Maintenance Due   Topic Date Due    COVID-19 Vaccine (1) Never done    DTaP/Tdap/Td  (1 - Tdap) Never done

## 2021-05-27 NOTE — PROGRESS NOTES
HPI  Aman Murphy comes in for f/u care. Sinus congestion: Patient has sinus congestion. She has postnasal drainage that is clear. No fever or chills. Gets recurrent sinus infections. Most recently was on an antibiotic. We discussed management options. She has been seen by the ENT specialist in the past.  For now she will continue with supportive care. She has used Flonase with some relief. She will also take an over-the-counter antihistamine. I will follow-up in case of no improvement or worsening symptoms. Dysphagia/ esophageal dilatation:  GERD: Patient has epigastric pain and discomfort. She is being followed up by the gastroenterologist.  She currently is on omeprazole. She is due to have an EGD done next month. She denies dark stools. HTN: Patient has hypertension. Blood pressure is stable. She is on HCTZ. We will continue with this medication. COPD: Patient has COPD. Gets occasional wheeze and shortness of breath. She is on Anoro Ellipta and has albuterol to use as needed. Menopausal symptoms: Patient has menopausal symptoms. She gets hot flashes. She is on Paxil. This has helped with the symptoms. Insomnia: Patient has insomnia. She is on trazodone. Tolerating the medication. We will continue current treatment plan. I will send a refill of the same. HM: Patient is due to have colon cancer screening done.   She will discuss this with her gastroenterologist.      Past Medical History  Past Medical History:   Diagnosis Date    Acid reflux     Anemia     COPD (chronic obstructive pulmonary disease) ([address]     Cystic disease of kidney     GERD (gastroesophageal reflux disease)     Hemorrhoid     History of blood transfusion     HTN (hypertension)     Hyperlipidemia     Hypertension     Palpitations     Renal cyst     Thyroid disease     no meds left thyroidectomy       Surgical History  Past Surgical History:   Procedure Laterality Date    HX COLONOSCOPY      HX ENDOSCOPY      HX HEENT  2011    Partial thyroid    HX HYSTERECTOMY      HX ORTHOPAEDIC  2005    left shoulder sugery    HX PARTIAL HYSTERECTOMY  1990    HX THYROIDECTOMY Left         Medications  Current Outpatient Medications   Medication Sig Dispense Refill    Oxygen 2LPM nc HS      hydrOXYzine HCL (ATARAX) 25 mg tablet TAKE 1 TABLET BY MOUTH THREE TIMES DAILY AS NEEDED FOR ANXIETY 60 Tablet 0    fluticasone propionate (FLONASE) 50 mcg/actuation nasal spray Use 2 spray(s) in each nostril once daily 16 g 0    Trulance 3 mg tab Take 3 mg by mouth daily.  albuterol (PROVENTIL) 5 mg/mL nebulizer solution Take 2.5 mg by inhalation two (2) times a day.  PARoxetine (PAXIL) 10 mg tablet Take 10 mg by mouth daily.  atorvastatin (LIPITOR) 10 mg tablet Take 1 tablet by mouth once daily 90 Tab 0    hydroCHLOROthiazide (HYDRODIURIL) 12.5 mg tablet Take 1 tablet by mouth once daily 90 Tab 0    traZODone (DESYREL) 50 mg tablet Take 1 tablet by mouth nightly 30 Tab 2    umeclidinium-vilanterol (ANORO ELLIPTA) 62.5-25 mcg/actuation inhaler Take 1 Puff by inhalation daily.  aspirin delayed-release 81 mg tablet 81 mg.      albuterol (PROVENTIL HFA, VENTOLIN HFA, PROAIR HFA) 90 mcg/actuation inhaler Take  by inhalation.  omeprazole (PRILOSEC) 20 mg capsule Take 20 mg by mouth daily.          Allergies  Allergies   Allergen Reactions    Latex, Natural Rubber Rash       Family History  Family History   Problem Relation Age of Onset    Diabetes Mother     Hypertension Mother     Cancer Father     Hypertension Father        Social History  Social History     Socioeconomic History    Marital status: SINGLE     Spouse name: Not on file    Number of children: Not on file    Years of education: Not on file    Highest education level: Not on file   Occupational History    Not on file   Tobacco Use    Smoking status: Former Smoker     Packs/day: 0.30     Years: 27.00     Pack years: 8.10 Types: Cigarettes    Smokeless tobacco: Never Used   Vaping Use    Vaping Use: Never used   Substance and Sexual Activity    Alcohol use: Yes     Alcohol/week: 0.0 standard drinks     Comment: rarely    Drug use: No    Sexual activity: Never   Other Topics Concern    Not on file   Social History Narrative    ** Merged History Encounter **          Social Determinants of Health     Financial Resource Strain:     Difficulty of Paying Living Expenses:    Food Insecurity:     Worried About Running Out of Food in the Last Year:     Ran Out of Food in the Last Year:    Transportation Needs:     Lack of Transportation (Medical):  Lack of Transportation (Non-Medical):    Physical Activity:     Days of Exercise per Week:     Minutes of Exercise per Session:    Stress:     Feeling of Stress :    Social Connections:     Frequency of Communication with Friends and Family:     Frequency of Social Gatherings with Friends and Family:     Attends Ten[employer] Services:     Active Member of Clubs or Organizations:     Attends Club or Organization Meetings:     Marital Status:    Intimate Partner Violence:     Fear of Current or Ex-Partner:     Emotionally Abused:     Physically Abused:     Sexually Abused:        Review of Systems  Review of Systems - Review of all systems is negative except as noted above in the HPI.     Vital Signs  Visit Vitals  /69 (BP 1 Location: Left upper arm, BP Patient Position: Sitting, BP Cuff Size: Adult)   Pulse 95   Temp 97.6 °F (36.4 °C) (Oral)   Resp 18   Ht 5' 2\" (1.575 m)   Wt 161 lb (73 kg)   LMP 05/08/1981   SpO2 96%   BMI 29.45 kg/m²         Physical Exam  Physical Examination: General appearance - oriented to person, place, and time and acyanotic, in no respiratory distress  Mental status - alert, oriented to person, place, and time, affect appropriate to mood  Nose - normal nontender sinuses  Mouth - mucous membranes moist, pharynx normal without lesions  Neck - supple, no significant adenopathy  Lymphatics - no palpable lymphadenopathy, no hepatosplenomegaly  Chest - clear to auscultation, no wheezes, rales or rhonchi, symmetric air entry  Heart - S1 and S2 normal  Abdomen - no rebound tenderness noted  Back exam - limited range of motion  Neurological - abnormal neurological exam unchanged from prior examinations  Musculoskeletal - osteoarthritic changes noted in both hands  Extremities - no pedal edema noted, intact peripheral pulses      Results  Results for orders placed or performed during the hospital encounter of 05/07/21   NOVEL CORONAVIRUS (COVID-19)   Result Value Ref Range    SARS-CoV-2 Not Detected Not Detected         ASSESSMENT and PLAN    ICD-10-CM ICD-9-CM    1. Chronic obstructive pulmonary disease with acute exacerbation (HCC)  J44.1 491.21    2. Dyslipidemia  E78.5 272.4 atorvastatin (LIPITOR) 10 mg tablet   3. Insomnia, unspecified type  G47.00 780.52 traZODone (DESYREL) 50 mg tablet   4. Essential hypertension  I10 401.9    5. Sinusitis, unspecified chronicity, unspecified location  J32.9 473.9 fluticasone propionate (FLONASE) 50 mcg/actuation nasal spray   6. Menopausal and female climacteric states  N95.1 627.2 PARoxetine (PAXIL) 10 mg tablet     lab results and schedule of future lab studies reviewed with patient  reviewed diet, exercise and weight control  cardiovascular risk and specific lipid/LDL goals reviewed  reviewed medications and side effects in detail      I have discussed the diagnosis with the patient and the intended plan of care as seen in the above orders. The patient has received an after-visit summary and questions were answered concerning future plans. I have discussed medication, side effects, and warnings with the patient in detail. The patient verbalized understanding and is in agreement with the plan of care. The patient will contact the office with any additional concerns.     Levi Mosley MD    PLEASE NOTE:   This document has been produced using voice recognition software.  Unrecognized errors in transcription may be present

## 2021-06-24 DIAGNOSIS — F41.9 ANXIETY: ICD-10-CM

## 2021-06-24 RX ORDER — HYDROXYZINE 25 MG/1
TABLET, FILM COATED ORAL
Qty: 60 TABLET | Refills: 0 | Status: SHIPPED | OUTPATIENT
Start: 2021-06-24 | End: 2021-07-28

## 2021-07-28 DIAGNOSIS — F41.9 ANXIETY: ICD-10-CM

## 2021-07-28 DIAGNOSIS — J32.9 SINUSITIS, UNSPECIFIED CHRONICITY, UNSPECIFIED LOCATION: ICD-10-CM

## 2021-07-28 RX ORDER — HYDROXYZINE 25 MG/1
TABLET, FILM COATED ORAL
Qty: 60 TABLET | Refills: 0 | Status: SHIPPED | OUTPATIENT
Start: 2021-07-28 | End: 2021-08-29

## 2021-07-28 RX ORDER — FLUTICASONE PROPIONATE 50 MCG
SPRAY, SUSPENSION (ML) NASAL
Qty: 16 G | Refills: 0 | Status: SHIPPED | OUTPATIENT
Start: 2021-07-28 | End: 2021-08-29

## 2021-08-27 ENCOUNTER — HOSPITAL ENCOUNTER (OUTPATIENT)
Dept: PREADMISSION TESTING | Age: 66
Discharge: HOME | End: 2021-08-27
Payer: MEDICARE

## 2021-08-27 ENCOUNTER — TRANSCRIBE ORDER (OUTPATIENT)
Dept: REGISTRATION | Age: 66
End: 2021-08-27

## 2021-08-27 DIAGNOSIS — Z20.828 EXPOSURE TO SARS-ASSOCIATED CORONAVIRUS: ICD-10-CM

## 2021-08-27 DIAGNOSIS — Z01.812 BLOOD TESTS PRIOR TO TREATMENT OR PROCEDURE: ICD-10-CM

## 2021-08-27 DIAGNOSIS — Z01.812 BLOOD TESTS PRIOR TO TREATMENT OR PROCEDURE: Primary | ICD-10-CM

## 2021-08-27 PROCEDURE — U0003 INFECTIOUS AGENT DETECTION BY NUCLEIC ACID (DNA OR RNA); SEVERE ACUTE RESPIRATORY SYNDROME CORONAVIRUS 2 (SARS-COV-2) (CORONAVIRUS DISEASE [COVID-19]), AMPLIFIED PROBE TECHNIQUE, MAKING USE OF HIGH THROUGHPUT TECHNOLOGIES AS DESCRIBED BY CMS-2020-01-R: HCPCS

## 2021-08-28 LAB — SARS-COV-2, COV2NT: NOT DETECTED

## 2021-08-29 DIAGNOSIS — E78.5 DYSLIPIDEMIA: ICD-10-CM

## 2021-08-29 DIAGNOSIS — J32.9 SINUSITIS, UNSPECIFIED CHRONICITY, UNSPECIFIED LOCATION: ICD-10-CM

## 2021-08-29 DIAGNOSIS — F41.9 ANXIETY: ICD-10-CM

## 2021-08-29 DIAGNOSIS — I10 ESSENTIAL HYPERTENSION: ICD-10-CM

## 2021-08-29 RX ORDER — FLUTICASONE PROPIONATE 50 MCG
SPRAY, SUSPENSION (ML) NASAL
Qty: 16 G | Refills: 0 | Status: SHIPPED | OUTPATIENT
Start: 2021-08-29 | End: 2021-09-30 | Stop reason: SDUPTHER

## 2021-08-29 RX ORDER — ATORVASTATIN CALCIUM 10 MG/1
TABLET, FILM COATED ORAL
Qty: 90 TABLET | Refills: 0 | Status: SHIPPED | OUTPATIENT
Start: 2021-08-29 | End: 2021-12-02

## 2021-08-29 RX ORDER — HYDROCHLOROTHIAZIDE 12.5 MG/1
TABLET ORAL
Qty: 90 TABLET | Refills: 0 | Status: SHIPPED | OUTPATIENT
Start: 2021-08-29 | End: 2021-12-02

## 2021-08-29 RX ORDER — HYDROXYZINE 25 MG/1
TABLET, FILM COATED ORAL
Qty: 60 TABLET | Refills: 0 | Status: SHIPPED | OUTPATIENT
Start: 2021-08-29 | End: 2021-10-12

## 2021-09-01 ENCOUNTER — ANESTHESIA EVENT (OUTPATIENT)
Dept: ENDOSCOPY | Age: 66
End: 2021-09-01
Payer: MEDICARE

## 2021-09-02 ENCOUNTER — HOSPITAL ENCOUNTER (OUTPATIENT)
Age: 66
Setting detail: OUTPATIENT SURGERY
Discharge: HOME | End: 2021-09-02
Attending: INTERNAL MEDICINE | Admitting: INTERNAL MEDICINE
Payer: MEDICARE

## 2021-09-02 ENCOUNTER — ANESTHESIA (OUTPATIENT)
Dept: ENDOSCOPY | Age: 66
End: 2021-09-02
Payer: MEDICARE

## 2021-09-02 VITALS
WEIGHT: 160 LBS | TEMPERATURE: 97 F | OXYGEN SATURATION: 93 % | RESPIRATION RATE: 20 BRPM | DIASTOLIC BLOOD PRESSURE: 86 MMHG | HEIGHT: 62 IN | BODY MASS INDEX: 29.44 KG/M2 | HEART RATE: 101 BPM | SYSTOLIC BLOOD PRESSURE: 148 MMHG

## 2021-09-02 PROCEDURE — 00813 ANES UPR LWR GI NDSC PX: CPT | Performed by: NURSE ANESTHETIST, CERTIFIED REGISTERED

## 2021-09-02 PROCEDURE — 74011250636 HC RX REV CODE- 250/636: Performed by: NURSE ANESTHETIST, CERTIFIED REGISTERED

## 2021-09-02 PROCEDURE — 77030008565 HC TBNG SUC IRR ERBE -B: Performed by: INTERNAL MEDICINE

## 2021-09-02 PROCEDURE — 88342 IMHCHEM/IMCYTCHM 1ST ANTB: CPT

## 2021-09-02 PROCEDURE — 77030021593 HC FCPS BIOP ENDOSC BSC -A: Performed by: INTERNAL MEDICINE

## 2021-09-02 PROCEDURE — 2709999900 HC NON-CHARGEABLE SUPPLY: Performed by: INTERNAL MEDICINE

## 2021-09-02 PROCEDURE — 74011000250 HC RX REV CODE- 250: Performed by: NURSE ANESTHETIST, CERTIFIED REGISTERED

## 2021-09-02 PROCEDURE — 00813 ANES UPR LWR GI NDSC PX: CPT | Performed by: ANESTHESIOLOGY

## 2021-09-02 PROCEDURE — 76040000007: Performed by: INTERNAL MEDICINE

## 2021-09-02 PROCEDURE — 88305 TISSUE EXAM BY PATHOLOGIST: CPT

## 2021-09-02 PROCEDURE — 76060000032 HC ANESTHESIA 0.5 TO 1 HR: Performed by: INTERNAL MEDICINE

## 2021-09-02 RX ORDER — LIDOCAINE HYDROCHLORIDE 20 MG/ML
INJECTION, SOLUTION EPIDURAL; INFILTRATION; INTRACAUDAL; PERINEURAL AS NEEDED
Status: DISCONTINUED | OUTPATIENT
Start: 2021-09-02 | End: 2021-09-02 | Stop reason: HOSPADM

## 2021-09-02 RX ORDER — DEXTROSE 50 % IN WATER (D50W) INTRAVENOUS SYRINGE
25-50 AS NEEDED
Status: CANCELLED | OUTPATIENT
Start: 2021-09-02

## 2021-09-02 RX ORDER — SODIUM CHLORIDE, SODIUM LACTATE, POTASSIUM CHLORIDE, CALCIUM CHLORIDE 600; 310; 30; 20 MG/100ML; MG/100ML; MG/100ML; MG/100ML
25 INJECTION, SOLUTION INTRAVENOUS CONTINUOUS
Status: DISCONTINUED | OUTPATIENT
Start: 2021-09-02 | End: 2021-09-02 | Stop reason: HOSPADM

## 2021-09-02 RX ORDER — MAGNESIUM SULFATE 100 %
4 CRYSTALS MISCELLANEOUS AS NEEDED
Status: CANCELLED | OUTPATIENT
Start: 2021-09-02

## 2021-09-02 RX ORDER — SODIUM CHLORIDE, SODIUM LACTATE, POTASSIUM CHLORIDE, CALCIUM CHLORIDE 600; 310; 30; 20 MG/100ML; MG/100ML; MG/100ML; MG/100ML
75 INJECTION, SOLUTION INTRAVENOUS CONTINUOUS
Status: CANCELLED | OUTPATIENT
Start: 2021-09-02 | End: 2021-09-02

## 2021-09-02 RX ORDER — FENTANYL CITRATE 50 UG/ML
25 INJECTION, SOLUTION INTRAMUSCULAR; INTRAVENOUS AS NEEDED
Status: CANCELLED | OUTPATIENT
Start: 2021-09-02

## 2021-09-02 RX ORDER — SODIUM CHLORIDE 0.9 % (FLUSH) 0.9 %
5-40 SYRINGE (ML) INJECTION EVERY 8 HOURS
Status: CANCELLED | OUTPATIENT
Start: 2021-09-02

## 2021-09-02 RX ORDER — SODIUM CHLORIDE 0.9 % (FLUSH) 0.9 %
5-40 SYRINGE (ML) INJECTION AS NEEDED
Status: CANCELLED | OUTPATIENT
Start: 2021-09-02

## 2021-09-02 RX ORDER — ONDANSETRON 2 MG/ML
4 INJECTION INTRAMUSCULAR; INTRAVENOUS ONCE
Status: CANCELLED | OUTPATIENT
Start: 2021-09-02 | End: 2021-09-02

## 2021-09-02 RX ORDER — PROPOFOL 10 MG/ML
INJECTION, EMULSION INTRAVENOUS AS NEEDED
Status: DISCONTINUED | OUTPATIENT
Start: 2021-09-02 | End: 2021-09-02 | Stop reason: HOSPADM

## 2021-09-02 RX ADMIN — PROPOFOL 20 MG: 10 INJECTION, EMULSION INTRAVENOUS at 11:57

## 2021-09-02 RX ADMIN — LIDOCAINE HYDROCHLORIDE 100 MG: 20 INJECTION, SOLUTION EPIDURAL; INFILTRATION; INTRACAUDAL; PERINEURAL at 11:46

## 2021-09-02 RX ADMIN — PROPOFOL 50 MG: 10 INJECTION, EMULSION INTRAVENOUS at 11:50

## 2021-09-02 RX ADMIN — SODIUM CHLORIDE, SODIUM LACTATE, POTASSIUM CHLORIDE, AND CALCIUM CHLORIDE 25 ML/HR: 600; 310; 30; 20 INJECTION, SOLUTION INTRAVENOUS at 10:56

## 2021-09-02 RX ADMIN — PROPOFOL 50 MG: 10 INJECTION, EMULSION INTRAVENOUS at 11:56

## 2021-09-02 RX ADMIN — FAMOTIDINE 20 MG: 10 INJECTION INTRAVENOUS at 10:56

## 2021-09-02 RX ADMIN — PROPOFOL 100 MG: 10 INJECTION, EMULSION INTRAVENOUS at 11:46

## 2021-09-02 NOTE — DISCHARGE INSTRUCTIONS
Upper GI Endoscopy: What to Expect at [address] [place] [address]  After you have an endoscopy, you will stay at the hospital or clinic for 1 to 2 hours. This will allow the medicine to wear off. You will be able to go home after your doctor or nurse checks to make sure you are not having any problems. You may have to stay overnight if you had treatment during the test. You may have a sore throat for a day or two after the test.  This care sheet gives you a general idea about what to expect after the test.  How can you care for yourself at home? Activity  · Rest as much as you need to after you go home. · You should be able to go back to your usual activities the day after the test.  Diet  · Follow your doctor's directions for eating after the test.  · Drink plenty of fluids (unless your doctor has told you not to). Medications  · If you have a sore throat the day after the test, use an over-the-counter spray to numb your throat. Follow-up care is a key part of your treatment and safety. Be sure to make and go to all appointments, and call your doctor if you are having problems. It's also a good idea to know your test results and keep a list of the medicines you take. When should you call for help? Call 911 anytime you think you may need emergency care. For example, call if:  · You passed out (lost consciousness). · You cough up blood. · You vomit blood or what looks like coffee grounds. · You pass maroon or very bloody stools. Call your doctor now or seek immediate medical care if:  · You have trouble swallowing. · You have belly pain. · Your stools are black and tarlike or have streaks of blood. · You are sick to your stomach or cannot keep fluids down. Watch closely for changes in your health, and be sure to contact your doctor if:  · Your throat still hurts after a day or two. · You do not get better as expected. Where can you learn more?    Go to DealExplorer.be  Enter J454 in the search box to learn more about \"Upper GI Endoscopy: What to Expect at Home. \"   © 0842-8473 Healthwise, Incorporated. Care instructions adapted under license by [employer] (which disclaims liability or warranty for this information). This care instruction is for use with your licensed healthcare professional. If you have questions about a medical condition or this instruction, always ask your healthcare professional. [address] any warranty or liability for your use of this information. Content Version: 41.6.563411; Current as of: November 14, 2014  Patient Education      Esophageal Dilation: What to Expect at [address] had an esophageal dilation. This procedure can open up narrow areas of the esophagus. After the procedure, you will stay at the hospital or surgery center for 1 to 2 hours. This will allow the medicine to wear off. You will be able to go home after your doctor or nurse checks to make sure that you're not having any problems. This care sheet gives you a general idea about how long it will take for you to recover. But each person recovers at a different pace. Follow the steps below to get better as quickly as possible. How can you care for yourself at home? Activity    · Rest as much as you need to after you go home.     · You should be able to go back to your usual activities the day after the procedure. Diet    · Follow your doctor's directions for eating after the procedure.     · Drink plenty of fluids (unless your doctor has told you not to). Medicines    · Your doctor will tell you if and when you can restart your medicines. He or she will also give you instructions about taking any new medicines.     · If you take aspirin or some other blood thinner, ask your doctor if and when to start taking it again.  Make sure that you understand exactly what your doctor wants you to do.     · If you have a sore throat the day after the procedure, use an over-the-counter spray to numb your throat. Sucking on throat lozenges and gargling with warm salt water may also help relieve your symptoms. Follow-up care is a key part of your treatment and safety. Be sure to make and go to all appointments, and call your doctor if you are having problems. It's also a good idea to know your test results and keep a list of the medicines you take. When should you call for help? Call 911 anytime you think you may need emergency care. For example, call if:    · You passed out (lost consciousness).     · You have trouble breathing.     · Your stools are maroon or very bloody   Call your doctor now or seek immediate medical care if:    · You have new or worse belly pain.     · You have a fever.     · You have new or more blood in your stools.     · You are sick to your stomach and cannot drink fluids.     · You cannot pass stools or gas.     · You have pain that does not get better after you take pain medicine. Watch closely for changes in your health, and be sure to contact your doctor if:    · Your throat still hurts after a day or two.     · You do not get better as expected. Where can you learn more? Go to http://www.gray.com/  Enter J014 in the search box to learn more about \"Esophageal Dilation: What to Expect at Home. \"  Current as of: April 15, 2020               Content Version: 12.8  © 8975-5614 Healthwise, Incorporated. Care instructions adapted under license by [employer] (which disclaims liability or warranty for this information). If you have questions about a medical condition or this instruction, always ask your healthcare professional. [place] [address] any warranty or liability for your use of this information. Colonoscopy: What to Expect at [address] [place] [address]  After you have a colonoscopy, you will stay at the clinic for 1 to 2 hours until the medicines wear off. Then you can go home.  But you will need to arrange for a ride. Your doctor will tell you when you can eat and do your other usual activities. Your doctor will talk to you about when you will need your next colonoscopy. Your doctor can help you decide how often you need to be checked. This will depend on the results of your test and your risk for colorectal cancer. After the test, you may be bloated or have gas pains. You may need to pass gas. If a biopsy was done or a polyp was removed, you may have streaks of blood in your stool (feces) for a few days. This care sheet gives you a general idea about how long it will take for you to recover. But each person recovers at a different pace. Follow the steps below to get better as quickly as possible. How can you care for yourself at home? Activity  · Rest when you feel tired. · You can do your normal activities when it feels okay to do so. Diet  · Follow your doctor's directions for eating. · Unless your doctor has told you not to, drink plenty of fluids. This helps to replace the fluids that were lost during the colon prep. · Do not drink alcohol. Medicines  · If polyps were removed or a biopsy was done during the test, your doctor may tell you not to take aspirin or other anti-inflammatory medicines for a few days. These include ibuprofen (Advil, Motrin) and naproxen (Aleve). Other instructions  · For your safety, do not drive or operate machinery until the medicine wears off and you can think clearly. Your doctor may tell you not to drive or operate machinery until the day after your test.  · Do not sign legal documents or make major decisions until the medicine wears off and you can think clearly. The anesthesia can make it hard for you to fully understand what you are agreeing to. Follow-up care is a key part of your treatment and safety. Be sure to make and go to all appointments, and call your doctor if you are having problems.  It's also a good idea to know your test results and keep a list of the medicines you take. When should you call for help? Call 911 anytime you think you may need emergency care. For example, call if:  · You passed out (lost consciousness). · You pass maroon or bloody stools. · You have severe belly pain. Call your doctor now or seek immediate medical care if:  · Your stools are black and tarlike. · Your stools have streaks of blood, but you did not have a biopsy or any polyps removed. · You have belly pain, or your belly is swollen and firm. · You vomit. · You have a fever. · You are very dizzy. Watch closely for changes in your health, and be sure to contact your doctor if you have any problems. Where can you learn more? Go to [employer]  Enter E264 in the search box to learn more about \"Colonoscopy: What to Expect at Home. \"   © 9000-8784 Healthwise, Incorporated. Care instructions adapted under license by [facility] [employer] (which disclaims liability or warranty for this information). This care instruction is for use with your licensed healthcare professional. If you have questions about a medical condition or this instruction, always ask your healthcare professional. [place] [address] any warranty or liability for your use of this information. Content Version: 81.5.320532; Current as of: November 14, 2014      DISCHARGE SUMMARY from Nurse     POST-PROCEDURE INSTRUCTIONS:    Call your Physician if you:  ? Observe any excess bleeding. ? Develop a temperature over 100.5o F.  ? Experience abdominal, shoulder or chest pain. ? Notice any signs of decreased circulation or nerve impairment to an extremity such as a change in color, persistent numbness, tingling, coldness or increase in pain. ? Vomit blood or you have nausea and vomiting lasting longer than 4 hours. ? Are unable to take medications. ? Are unable to urinate within 8 hours after discharge following general anesthesia or intravenous sedation.     For the next 24 hours after receiving general anesthesia or intravenous sedation, or while taking prescription Narcotics, limit your activities:  ? Do NOT drive a motor vehicle, operate hazard machinery or power tools, or perform tasks that require coordination. The medication you received during your procedure may have some effect on your mental awareness. ? Do NOT make important personal or business decisions. The medication you received during your procedure may have some effect on your mental awareness. ? Do NOT drink alcoholic beverages. These drinks do not mix well with the medications that have been given to you. ? Upon discharge from the hospital, you must be accompanied by a responsible adult. ? Resume your diet as directed by your physician. ? Resume medications as your physician has prescribed. ? Please give a list of your current medications to your Primary Care Provider. ? Please update this list whenever your medications are discontinued, doses are changed, or new medications (including over-the-counter products) are added. ? Please carry medication information at all times in case of emergency situations. These are general instructions for a healthy lifestyle:    No smoking/ No tobacco products/ Avoid exposure to second hand smoke.  Surgeon General's Warning:  Quitting smoking now greatly reduces serious risk to your health. Obesity, smoking, and a sedentary lifestyle greatly increase your risk for illness.  A healthy diet, regular physical exercise & weight monitoring are important for maintaining a healthy lifestyle   You may be retaining fluid if you have a history of heart failure or if you experience any of the following symptoms:  Weight gain of 3 pounds or more overnight or 5 pounds in a week, increased swelling in our hands or feet or shortness of breath while lying flat in bed.   Please call your doctor as soon as you notice any of these symptoms; do not wait until your next office visit. Colorectal Screening   Colorectal cancer almost always develops from precancerous polyps (abnormal growths) in the colon or rectum. Screening tests can find precancerous polyps, so that they can be removed before they turn into cancer. Screening tests can also find colorectal cancer early, when treatment works best.  Cam Pimentel Speak with your physician about when you should begin screening and how often you should be tested. Additional Information    Educational references and/or instructions provided during this visit included:    See attached. APPOINTMENTS:    Per MD Instruction. Discharge information has been reviewed with the patient. The patient verbalized understanding.

## 2021-09-02 NOTE — ANESTHESIA PREPROCEDURE EVALUATION
Relevant Problems   No relevant active problems       Anesthetic History     PONV          Review of Systems / Medical History  Patient summary reviewed, nursing notes reviewed and pertinent labs reviewed    Pulmonary    COPD               Neuro/Psych   Within defined limits           Cardiovascular    Hypertension              Exercise tolerance: >4 METS     GI/Hepatic/Renal     GERD           Endo/Other      Hypothyroidism       Other Findings              Physical Exam    Airway  Mallampati: II  TM Distance: 4 - 6 cm  Neck ROM: normal range of motion   Mouth opening: Normal     Cardiovascular  Regular rate and rhythm,  S1 and S2 normal,  no murmur, click, rub, or gallop  Rhythm: regular  Rate: normal         Dental  No notable dental hx       Pulmonary  Breath sounds clear to auscultation               Abdominal  GI exam deferred       Other Findings            Anesthetic Plan    ASA: 3  Anesthesia type: MAC          Induction: Intravenous  Anesthetic plan and risks discussed with: Patient

## 2021-09-02 NOTE — H&P
[facility]    [email or web address]      Impression:   1.gerd  Dysphagia  abd pain  Hx of colon polyps      Plan:   [place] and EGD w/ dilate and/or Bx   1. Chief Complaint:   TK, Hx of polyps    HPI:  Dayana Hidalgo is a 78 y.o. female who is being seen on consult for the above. Mild pain , no wt loss, see records. .  Pt on O2 at night for copd. PMH:   Past Medical History:   Diagnosis Date    Acid reflux     Anemia     COPD (chronic obstructive pulmonary disease) (HCC)     Cystic disease of kidney     GERD (gastroesophageal reflux disease)     Hemorrhoid     History of blood transfusion     HTN (hypertension)     Hyperlipidemia     Hypertension     Palpitations     resolved per patient(8/31/2021)    Renal cyst     Thyroid disease     no meds left thyroidectomy       PSH:   Past Surgical History:   Procedure Laterality Date    HX COLONOSCOPY      HX ENDOSCOPY      HX HEENT  2011    Partial thyroid    HX HYSTERECTOMY      HX ORTHOPAEDIC  2005    left shoulder sugery    HX PARTIAL HYSTERECTOMY  1990    HX THYROIDECTOMY Left        Social HX:   Social History     Socioeconomic History    Marital status: SINGLE     Spouse name: Not on file    Number of children: Not on file    Years of education: Not on file    Highest education level: Not on file   Occupational History    Not on file   Tobacco Use    Smoking status: Former Smoker     Packs/day: 0.30     Years: 27.00     Pack years: 8.10     Types: Cigarettes    Smokeless tobacco: Never Used   Vaping Use    Vaping Use: Never used   Substance and Sexual Activity    Alcohol use:  Yes     Alcohol/week: 0.0 standard drinks     Comment: rarely    Drug use: No    Sexual activity: Never   Other Topics Concern    Not on file   Social History Narrative    ** Merged History Encounter **          Social Determinants of Health     Financial Resource Strain:     Difficulty of Paying Living Expenses:    Food Insecurity:     Worried About Running Out of Food in the Last Year:     [address] in the Last Year:    Transportation Needs:     Lack of Transportation (Medical):  Lack of Transportation (Non-Medical):    Physical Activity:     Days of Exercise per Week:     Minutes of Exercise per Session:    Stress:     Feeling of Stress :    Social Connections:     Frequency of Communication with Friends and Family:     Frequency of Social Gatherings with Friends and Family:     Attends Christianity Services:     Active Member of Clubs or Organizations:     Attends Club or Organization Meetings:     Marital Status:    Intimate Partner Violence:     Fear of Current or Ex-Partner:     Emotionally Abused:     Physically Abused:     Sexually Abused:        FHX:   Family History   Problem Relation Age of Onset    Diabetes Mother     Hypertension Mother     Cancer Father     Hypertension Father        Allergy:   Allergies   Allergen Reactions    Latex, Natural Rubber Rash       Home Medications:     Medications Prior to Admission   Medication Sig    hydroCHLOROthiazide (HYDRODIURIL) 12.5 mg tablet Take 1 tablet by mouth once daily    atorvastatin (LIPITOR) 10 mg tablet Take 1 tablet by mouth once daily    hydrOXYzine HCL (ATARAX) 25 mg tablet TAKE 1 TABLET BY MOUTH THREE TIMES DAILY AS NEEDED FOR ANXIETY    fluticasone propionate (FLONASE) 50 mcg/actuation nasal spray Use 2 spray(s) in each nostril once daily    PARoxetine (PAXIL) 10 mg tablet Take 1 Tablet by mouth daily.  traZODone (DESYREL) 50 mg tablet Take 1 tablet by mouth nightly    Oxygen 2LPM nc HS    albuterol (PROVENTIL) 5 mg/mL nebulizer solution Take 2.5 mg by inhalation two (2) times a day.  aspirin delayed-release 81 mg tablet 81 mg.    albuterol (PROVENTIL HFA, VENTOLIN HFA, PROAIR HFA) 90 mcg/actuation inhaler Take  by inhalation.  omeprazole (PRILOSEC) 20 mg capsule Take 20 mg by mouth daily.     Trulance 3 mg tab Take 3 mg by mouth daily.  umeclidinium-vilanterol (ANORO ELLIPTA) 62.5-25 mcg/actuation inhaler Take 1 Puff by inhalation daily. Review of Systems:     Constitutional: No fevers, chills, weight loss, fatigue. Skin: No rashes, pruritis, jaundice, ulcerations, erythema. HENT: No headaches, nosebleeds, sinus pressure, rhinorrhea, sore throat. Eyes: No visual changes, blurred vision, eye pain, photophobia, jaundice. Cardiovascular: No chest pain, heart palpitations. Respiratory: No cough, SOB, wheezing, chest discomfort, orthopnea. Gastrointestinal: Mild generalized abd pain    Genitourinary: No dysuria, bleeding, discharge, pyuria. Musculoskeletal: No weakness, arthralgias, wasting. Endo: No sweats. Heme: No bruising, easy bleeding. Allergies: As noted. Neurological: Cranial nerves intact. Alert and oriented. Gait not assessed. Psychiatric:  No anxiety, depression, hallucinations. Visit Vitals  Ht 5' 2\" (1.575 m)   Wt 72.6 kg (160 lb)   LMP 05/08/1981   BMI 29.26 kg/m²       Physical Assessment:     constitutional: appearance: well developed, well nourished, normal habitus, no deformities, in no acute distress. skin: inspection: no rashes, ulcers, icterus or other lesions; no clubbing or telangiectasias. palpation: no induration or subcutaneos nodules. eyes: inspection: normal conjunctivae and lids; no jaundice pupils: symmetrical, normoreactive to light, normal accommodation and size. ENMT: mouth: normal oral mucosa,lips and gums; good dentition. oropharynx: normal tongue, hard and soft palate; posterior pharynx without erithema, exudate or lesions. neck: thyroid: normal size, consistency and position; no masses or tenderness. respiratory: effort: normal chest excursion; no intercostal retraction or accessory muscle use. cardiovascular: abdominal aorta: normal size and position; no bruits.  palpation: PMI of normal size and position; normal rhythm; no thrill or murmurs. abdominal: abdomen: normal consistency; no tenderness or masses. hernias: no hernias appreciated. liver: normal size and consistency. spleen: not palpable. rectal: hemoccult/guaiac: not performed. musculoskeletal: digits and nails: no clubbing, cyanosis, petechiae or other inflammatory conditions. gait: normal gait and station head and neck: normal range of motion; no pain, crepitation or contracture. spine/ribs/pelvis: normal range of motion; no pain, deformity or contracture. lymphatic: axilae: not palpable. groin: not palpable. neck: within normal limits. other: not palpable. neurologic: cranial nerves: II-XII normal.   psychiatric: judgement/insight: within normal limits. memory: within normal limits for recent and remote events. mood and affect: no evidence of depression, anxiety or agitation. orientation: oriented to time, space and person. Basic Metabolic Profile   No results for input(s): NA, K, CL, CO2, BUN, GLU, CA, MG, PHOS in the last 72 hours. No lab exists for component: CREAT      CBC w/Diff    No results for input(s): WBC, RBC, HGB, HCT, MCV, MCH, MCHC, RDW, PLT, HGBEXT, HCTEXT, PLTEXT in the last 72 hours. No lab exists for component: MPV No results for input(s): GRANS, LYMPH, EOS, PRO, MYELO, METAS, BLAST in the last 72 hours. No lab exists for component: MONO, BASO     Hepatic Function   No results for input(s): ALB, TP, TBILI, AP, AML, LPSE in the last 72 hours. No lab exists for component: DBILI, GPT, SGOT       Marino Claude, MD, M.D. [facility], [address] [place] [address]  [email or web address]

## 2021-09-16 ENCOUNTER — CLINICAL SUPPORT (OUTPATIENT)
Dept: FAMILY MEDICINE CLINIC | Age: 66
End: 2021-09-16
Payer: MEDICARE

## 2021-09-16 VITALS
HEART RATE: 101 BPM | HEIGHT: 62 IN | SYSTOLIC BLOOD PRESSURE: 127 MMHG | DIASTOLIC BLOOD PRESSURE: 73 MMHG | OXYGEN SATURATION: 95 % | BODY MASS INDEX: 30 KG/M2 | WEIGHT: 163 LBS | TEMPERATURE: 97.2 F | RESPIRATION RATE: 16 BRPM

## 2021-09-16 DIAGNOSIS — Z12.31 ENCOUNTER FOR SCREENING MAMMOGRAM FOR BREAST CANCER: ICD-10-CM

## 2021-09-16 DIAGNOSIS — G47.00 INSOMNIA, UNSPECIFIED TYPE: ICD-10-CM

## 2021-09-16 DIAGNOSIS — E78.5 DYSLIPIDEMIA: ICD-10-CM

## 2021-09-16 DIAGNOSIS — F39 MOOD DISORDER (HCC): ICD-10-CM

## 2021-09-16 DIAGNOSIS — K21.9 GASTROESOPHAGEAL REFLUX DISEASE, UNSPECIFIED WHETHER ESOPHAGITIS PRESENT: ICD-10-CM

## 2021-09-16 DIAGNOSIS — J44.1 ACUTE EXACERBATION OF CHRONIC OBSTRUCTIVE PULMONARY DISEASE (HCC): ICD-10-CM

## 2021-09-16 DIAGNOSIS — J20.9 ACUTE PURULENT BRONCHITIS: Primary | ICD-10-CM

## 2021-09-16 DIAGNOSIS — I10 ESSENTIAL HYPERTENSION: ICD-10-CM

## 2021-09-16 PROCEDURE — 99214 OFFICE O/P EST MOD 30 MIN: CPT | Performed by: FAMILY MEDICINE

## 2021-09-16 RX ORDER — METHYLPREDNISOLONE 4 MG/1
TABLET ORAL
Qty: 1 DOSE PACK | Refills: 0 | Status: SHIPPED | OUTPATIENT
Start: 2021-09-16 | End: 2022-08-17

## 2021-09-16 RX ORDER — AZITHROMYCIN 250 MG/1
TABLET, FILM COATED ORAL
Qty: 6 TABLET | Refills: 0 | Status: SHIPPED | OUTPATIENT
Start: 2021-09-16 | End: 2021-09-21

## 2021-09-16 NOTE — PROGRESS NOTES
Chief Complaint   Patient presents with    Nasal Congestion    Shortness of Breath     states she can't breath    Cough    Headache     HPI  Anthony Jarvis comes in for f/u care. Cough: Patient has cough and chest congestion. Cough productive of mucopurulent phlegm. Has some chills, no fever. She also has sinus congestion with post nasal drainage. Postnasal drainage is mucopurulent in nature. She denies fever or chills. Occasionally has chest tightness. She has a history of COPD and is using her inhaler medication. This is COPD with exacerbation. States that in the past she has had to use an antibiotic to help with this. We discussed management options. I will send in azithromycin. I will also send in a Medrol Dosepak. She will continue with inhaler medication. GERD: Patient has a history of gastroesophageal reflux disease. She had EGD done by Dr Krysta Kurtz and noted to have hiatal hernia. She is on prilosec. She denies hematemesis or dark stools. We will continue with the current treatment plan. Mood disorder: Patient has a history of mood disorder with anxiety and depression. She has had a lot of relatives die recently. Her sister has moved back to [place] in [facility]. Patient continues to take Paxil and has hydroxyzine to take as needed for acute anxiety. We will continue with these medications. She is currently stable. Denies suicidal ideation. Dyslipidemia: Patient has dyslipidemia. She takes Lipitor. We will recheck lipid panel at next visit. Continue current treatment plan. HTN: Patient has hypertension. Blood pressure is stable. She is on HCTZ. She will continue with the medication. She denies headache, changes in vision or focal weakness. Insomnia: Patient has insomnia. She takes trazodone daily. We will continue current treatment plan. Health maintenance: Patient will get the flu vaccine at a later date. She needs to have a mammogram done.   Request for this will be placed. Past Medical History  Past Medical History:   Diagnosis Date    Acid reflux     Anemia     COPD (chronic obstructive pulmonary disease) (HCC)     Cystic disease of kidney     GERD (gastroesophageal reflux disease)     Hemorrhoid     History of blood transfusion     HTN (hypertension)     Hyperlipidemia     Hypertension     Palpitations     resolved per patient(8/31/2021)    Renal cyst     Thyroid disease     no meds left thyroidectomy       Surgical History  Past Surgical History:   Procedure Laterality Date    COLONOSCOPY N/A 9/2/2021    COLONOSCOPY performed by Jackie Cote MD at [facility] END[facility]COPY    HX COLONOSCOPY      HX ENDOSCOPY      HX HEENT  2011    Partial thyroid    HX HYSTERECTOMY      HX ORTHOPAEDIC  2005    left shoulder sugery    HX PARTIAL HYSTERECTOMY  1990    HX THYROIDECTOMY Left         Medications  Current Outpatient Medications   Medication Sig Dispense Refill    hydroCHLOROthiazide (HYDRODIURIL) 12.5 mg tablet Take 1 tablet by mouth once daily 90 Tablet 0    atorvastatin (LIPITOR) 10 mg tablet Take 1 tablet by mouth once daily 90 Tablet 0    hydrOXYzine HCL (ATARAX) 25 mg tablet TAKE 1 TABLET BY MOUTH THREE TIMES DAILY AS NEEDED FOR ANXIETY 60 Tablet 0    fluticasone propionate (FLONASE) 50 mcg/actuation nasal spray Use 2 spray(s) in each nostril once daily 16 g 0    PARoxetine (PAXIL) 10 mg tablet Take 1 Tablet by mouth daily. 90 Tablet 1    traZODone (DESYREL) 50 mg tablet Take 1 tablet by mouth nightly 30 Tablet 2    Oxygen 2LPM nc HS      Trulance 3 mg tab Take 3 mg by mouth daily.  albuterol (PROVENTIL) 5 mg/mL nebulizer solution Take 2.5 mg by inhalation two (2) times a day.  umeclidinium-vilanterol (ANORO ELLIPTA) 62.5-25 mcg/actuation inhaler Take 1 Puff by inhalation daily.  aspirin delayed-release 81 mg tablet 81 mg.      albuterol (PROVENTIL HFA, VENTOLIN HFA, PROAIR HFA) 90 mcg/actuation inhaler Take  by inhalation.  omeprazole (PRILOSEC) 20 mg capsule Take 20 mg by mouth daily. Allergies  Allergies   Allergen Reactions    Latex, Natural Rubber Rash       Family History  Family History   Problem Relation Age of Onset    Diabetes Mother     Hypertension Mother     Cancer Father     Hypertension Father        Social History  Social History     Socioeconomic History    Marital status: SINGLE     Spouse name: Not on file    Number of children: Not on file    Years of education: Not on file    Highest education level: Not on file   Occupational History    Not on file   Tobacco Use    Smoking status: Former Smoker     Packs/day: 0.30     Years: 27.00     Pack years: 8.10     Types: Cigarettes    Smokeless tobacco: Never Used   Vaping Use    Vaping Use: Never used   Substance and Sexual Activity    Alcohol use: Yes     Alcohol/week: 0.0 standard drinks     Comment: rarely    Drug use: No    Sexual activity: Never   Other Topics Concern    Not on file   Social History Narrative    ** Merged History Encounter **          Social Determinants of Health     Financial Resource Strain:     Difficulty of Paying Living Expenses:    Food Insecurity:     Worried About Running Out of Food in the Last Year:     Ran Out of Food in the Last Year:    Transportation Needs:     Lack of Transportation (Medical):      Lack of Transportation (Non-Medical):    Physical Activity:     Days of Exercise per Week:     Minutes of Exercise per Session:    Stress:     Feeling of Stress :    Social Connections:     Frequency of Communication with Friends and Family:     Frequency of Social Gatherings with Friends and Family:     Attends Congregational Services:     Active Member of Clubs or Organizations:     Attends Club or Organization Meetings:     Marital Status:    Intimate Partner Violence:     Fear of Current or Ex-Partner:     Emotionally Abused:     Physically Abused:     Sexually Abused:        Review of Systems  Review of Systems - Review of all systems is negative except as noted above in the HPI. Vital Signs  Visit Vitals  /73 (BP 1 Location: Left arm, BP Patient Position: Sitting, BP Cuff Size: Adult)   Pulse (!) 101   Temp 97.2 °F (36.2 °C) (Oral)   Resp 16   Ht 5' 2\" (1.575 m)   Wt 163 lb (73.9 kg)   LMP 05/08/1981   SpO2 95%   BMI 29.81 kg/m²         Physical Exam  Physical Examination: General appearance - oriented to person, place, and time and acyanotic, in no respiratory distress  Mental status - affect appropriate to mood  Neck - supple, no significant adenopathy  Lymphatics - no palpable lymphadenopathy  Chest - no tachypnea, retractions or cyanosis  Heart - S1 and S2 normal  Abdomen - no rebound tenderness noted  Back exam - limited range of motion  Neurological - abnormal neurological exam unchanged from prior examinations  Musculoskeletal - no muscular tenderness noted  Extremities - intact peripheral pulses      Results  Results for orders placed or performed during the hospital encounter of 08/27/21   NOVEL CORONAVIRUS (COVID-19)   Result Value Ref Range    SARS-CoV-2 Not Detected Not Detected         ASSESSMENT and PLAN    ICD-10-CM ICD-9-CM    1. Acute purulent bronchitis  J20.8 466.0 azithromycin (ZITHROMAX) 250 mg tablet   2. Acute exacerbation of chronic obstructive pulmonary disease (HCC)  J44.1 491.21 methylPREDNISolone (MEDROL DOSEPACK) 4 mg tablet   3. Encounter for screening mammogram for breast cancer  Z12.31 V76.12 [facility] MAMMO BI SCREENING INCL CAD   4. Essential hypertension  I10 401.9    5. Dyslipidemia  E78.5 272.4    6. Mood disorder (HCC)  F39 296.90    7. Insomnia, unspecified type  G47.00 780.52    8.  Gastroesophageal reflux disease, unspecified whether esophagitis present  K21.9 530.81      lab results and schedule of future lab studies reviewed with patient  reviewed diet, exercise and weight control  cardiovascular risk and specific lipid/LDL goals reviewed  reviewed medications and side effects in detail      I have discussed the diagnosis with the patient and the intended plan of care as seen in the above orders. The patient has received an after-visit summary and questions were answered concerning future plans. I have discussed medication, side effects, and warnings with the patient in detail. The patient verbalized understanding and is in agreement with the plan of care. The patient will contact the office with any additional concerns. Yaa Low MD    PLEASE NOTE:   This document has been produced using voice recognition software.  Unrecognized errors in transcription may be present

## 2021-09-29 ENCOUNTER — TELEPHONE (OUTPATIENT)
Dept: FAMILY MEDICINE CLINIC | Age: 66
End: 2021-09-29

## 2021-09-29 NOTE — TELEPHONE ENCOUNTER
Pt stated she is still having sinus issues. She came recently to see Dr Heri Mercedes and is now requesting for Dr Heri Mercedes to send in a prescription for her sinuse issues.  Please follow up with this pt to clarify when available

## 2021-09-30 DIAGNOSIS — J32.9 SINUSITIS, UNSPECIFIED CHRONICITY, UNSPECIFIED LOCATION: ICD-10-CM

## 2021-09-30 RX ORDER — FLUTICASONE PROPIONATE 50 MCG
SPRAY, SUSPENSION (ML) NASAL
Qty: 16 G | Refills: 0 | Status: SHIPPED | OUTPATIENT
Start: 2021-09-30 | End: 2021-11-01

## 2021-09-30 RX ORDER — MOXIFLOXACIN HYDROCHLORIDE 400 MG/1
400 TABLET ORAL DAILY
Qty: 10 TABLET | Refills: 0 | Status: SHIPPED | OUTPATIENT
Start: 2021-09-30 | End: 2021-12-02

## 2021-09-30 NOTE — TELEPHONE ENCOUNTER
I have sent in moxifloxacin to take daily. She should also use Flonase daily I have sent in a prescription for this. If symptoms not improve she will need to see the ENT doctor.   Alex Ortiz MD

## 2021-10-07 DIAGNOSIS — F41.9 ANXIETY: ICD-10-CM

## 2021-10-12 RX ORDER — HYDROXYZINE 25 MG/1
TABLET, FILM COATED ORAL
Qty: 60 TABLET | Refills: 0 | Status: SHIPPED | OUTPATIENT
Start: 2021-10-12 | End: 2021-11-01

## 2021-11-01 DIAGNOSIS — F41.9 ANXIETY: ICD-10-CM

## 2021-11-01 DIAGNOSIS — J32.9 SINUSITIS, UNSPECIFIED CHRONICITY, UNSPECIFIED LOCATION: ICD-10-CM

## 2021-11-01 RX ORDER — FLUTICASONE PROPIONATE 50 MCG
SPRAY, SUSPENSION (ML) NASAL
Qty: 16 G | Refills: 0 | Status: SHIPPED | OUTPATIENT
Start: 2021-11-01 | End: 2021-12-02

## 2021-11-01 RX ORDER — HYDROXYZINE 25 MG/1
TABLET, FILM COATED ORAL
Qty: 60 TABLET | Refills: 0 | Status: SHIPPED | OUTPATIENT
Start: 2021-11-01 | End: 2021-12-02

## 2021-11-30 DIAGNOSIS — J32.9 SINUSITIS, UNSPECIFIED CHRONICITY, UNSPECIFIED LOCATION: ICD-10-CM

## 2021-11-30 DIAGNOSIS — N95.1 MENOPAUSAL AND FEMALE CLIMACTERIC STATES: ICD-10-CM

## 2021-11-30 DIAGNOSIS — I10 ESSENTIAL HYPERTENSION: ICD-10-CM

## 2021-11-30 DIAGNOSIS — E78.5 DYSLIPIDEMIA: ICD-10-CM

## 2021-11-30 DIAGNOSIS — F41.9 ANXIETY: ICD-10-CM

## 2021-11-30 NOTE — TELEPHONE ENCOUNTER
Requested Prescriptions     Pending Prescriptions Disp Refills    fluticasone propionate (FLONASE) 50 mcg/actuation nasal spray [Pharmacy Med Name: Fluticasone Propionate 50 MCG/ACT Nasal Suspension] 16 g 0     Sig: Use 2 spray(s) in each nostril once daily    atorvastatin (LIPITOR) 10 mg tablet [Pharmacy Med Name: Atorvastatin Calcium 10 MG Oral Tablet] 90 Tablet 0     Sig: Take 1 tablet by mouth once daily    hydroCHLOROthiazide (HYDRODIURIL) 12.5 mg tablet [Pharmacy Med Name: hydroCHLOROthiazide 12.5 MG Oral Tablet] 90 Tablet 0     Sig: Take 1 tablet by mouth once daily    hydrOXYzine HCL (ATARAX) 25 mg tablet [Pharmacy Med Name: hydrOXYzine HCl 25 MG Oral Tablet] 60 Tablet 0     Sig: TAKE 1 TABLET BY MOUTH THREE TIMES DAILY AS NEEDED FOR ANXIETY    PARoxetine (PAXIL) 10 mg tablet 90 Tablet 1     Sig: Take 1 Tablet by mouth daily.

## 2021-11-30 NOTE — TELEPHONE ENCOUNTER
Requested Prescriptions     Pending Prescriptions Disp Refills    fluticasone propionate (FLONASE) 50 mcg/actuation nasal spray [Pharmacy Med Name: Fluticasone Propionate 50 MCG/ACT Nasal Suspension] 16 g 0     Sig: Use 2 spray(s) in each nostril once daily    atorvastatin (LIPITOR) 10 mg tablet [Pharmacy Med Name: Atorvastatin Calcium 10 MG Oral Tablet] 90 Tablet 0     Sig: Take 1 tablet by mouth once daily    hydroCHLOROthiazide (HYDRODIURIL) 12.5 mg tablet [Pharmacy Med Name: hydroCHLOROthiazide 12.5 MG Oral Tablet] 90 Tablet 0     Sig: Take 1 tablet by mouth once daily    hydrOXYzine HCL (ATARAX) 25 mg tablet [Pharmacy Med Name: hydrOXYzine HCl 25 MG Oral Tablet] 60 Tablet 0     Sig: TAKE 1 TABLET BY MOUTH THREE TIMES DAILY AS NEEDED FOR ANXIETY    PARoxetine (PAXIL) 10 mg tablet 90 Tablet 1     Sig: Take 1 Tablet by mouth daily. Fito Richards called for their medication refill.     Last Office visit:  05-  Last labs:  04-  Follow up visit:  05-  Last date prescribe 11-,08-,05-    Please Advise

## 2021-12-02 RX ORDER — FLUTICASONE PROPIONATE 50 MCG
SPRAY, SUSPENSION (ML) NASAL
Qty: 16 G | Refills: 0 | Status: SHIPPED | OUTPATIENT
Start: 2021-12-02 | End: 2022-01-02

## 2021-12-02 RX ORDER — HYDROXYZINE 25 MG/1
TABLET, FILM COATED ORAL
Qty: 60 TABLET | Refills: 0 | Status: SHIPPED | OUTPATIENT
Start: 2021-12-02 | End: 2021-12-22

## 2021-12-02 RX ORDER — HYDROCHLOROTHIAZIDE 12.5 MG/1
TABLET ORAL
Qty: 90 TABLET | Refills: 0 | Status: SHIPPED | OUTPATIENT
Start: 2021-12-02 | End: 2022-03-02

## 2021-12-02 RX ORDER — PAROXETINE 10 MG/1
10 TABLET, FILM COATED ORAL DAILY
Qty: 90 TABLET | Refills: 1 | Status: SHIPPED | OUTPATIENT
Start: 2021-12-02 | End: 2022-06-22 | Stop reason: SDUPTHER

## 2021-12-02 RX ORDER — ATORVASTATIN CALCIUM 10 MG/1
TABLET, FILM COATED ORAL
Qty: 90 TABLET | Refills: 0 | Status: SHIPPED | OUTPATIENT
Start: 2021-12-02 | End: 2022-03-24

## 2021-12-21 DIAGNOSIS — F41.9 ANXIETY: ICD-10-CM

## 2021-12-22 RX ORDER — HYDROXYZINE 25 MG/1
TABLET, FILM COATED ORAL
Qty: 60 TABLET | Refills: 0 | Status: SHIPPED | OUTPATIENT
Start: 2021-12-22 | End: 2022-01-18

## 2021-12-27 ENCOUNTER — TELEPHONE (OUTPATIENT)
Dept: FAMILY MEDICINE CLINIC | Age: 66
End: 2021-12-27

## 2021-12-27 ENCOUNTER — VIRTUAL VISIT (OUTPATIENT)
Dept: FAMILY MEDICINE CLINIC | Age: 66
End: 2021-12-27
Payer: MEDICARE

## 2021-12-27 DIAGNOSIS — B96.89 ACUTE BACTERIAL SINUSITIS: Primary | ICD-10-CM

## 2021-12-27 DIAGNOSIS — J01.90 ACUTE BACTERIAL SINUSITIS: Primary | ICD-10-CM

## 2021-12-27 PROCEDURE — G9899 SCRN MAM PERF RSLTS DOC: HCPCS | Performed by: FAMILY MEDICINE

## 2021-12-27 PROCEDURE — G8432 DEP SCR NOT DOC, RNG: HCPCS | Performed by: FAMILY MEDICINE

## 2021-12-27 PROCEDURE — G8399 PT W/DXA RESULTS DOCUMENT: HCPCS | Performed by: FAMILY MEDICINE

## 2021-12-27 PROCEDURE — 3017F COLORECTAL CA SCREEN DOC REV: CPT | Performed by: FAMILY MEDICINE

## 2021-12-27 PROCEDURE — G8427 DOCREV CUR MEDS BY ELIG CLIN: HCPCS | Performed by: FAMILY MEDICINE

## 2021-12-27 PROCEDURE — 1090F PRES/ABSN URINE INCON ASSESS: CPT | Performed by: FAMILY MEDICINE

## 2021-12-27 PROCEDURE — 99213 OFFICE O/P EST LOW 20 MIN: CPT | Performed by: FAMILY MEDICINE

## 2021-12-27 PROCEDURE — 1101F PT FALLS ASSESS-DOCD LE1/YR: CPT | Performed by: FAMILY MEDICINE

## 2021-12-27 PROCEDURE — G8417 CALC BMI ABV UP PARAM F/U: HCPCS | Performed by: FAMILY MEDICINE

## 2021-12-27 PROCEDURE — G8536 NO DOC ELDER MAL SCRN: HCPCS | Performed by: FAMILY MEDICINE

## 2021-12-27 PROCEDURE — G8756 NO BP MEASURE DOC: HCPCS | Performed by: FAMILY MEDICINE

## 2021-12-27 RX ORDER — AMOXICILLIN AND CLAVULANATE POTASSIUM 875; 125 MG/1; MG/1
1 TABLET, FILM COATED ORAL 2 TIMES DAILY
Qty: 20 TABLET | Refills: 0 | Status: SHIPPED | OUTPATIENT
Start: 2021-12-27 | End: 2022-01-06

## 2021-12-27 NOTE — PROGRESS NOTES
Chief Complaint   Patient presents with    Sinus Infection     1. \"Have you been to the ER, urgent care clinic since your last visit? Hospitalized since your last visit? \" No    2. \"Have you seen or consulted any other health care providers outside of the [address] [place] [address] since your last visit? \" No     3. For patients aged 39-70: Has the patient had a colonoscopy / FIT/ Cologuard? Yes, HM satisfied with blue hyperlink     If the patient is female:    4. For patients aged 41-77: Has the patient had a mammogram within the past 2 years? No    5. For patients aged 21-65: Has the patient had a pap smear?  NA based on age or sex

## 2021-12-27 NOTE — PROGRESS NOTES
Reed Weaver (: 1955) is a 78 y.o. female, established patient, here for evaluation of the following chief complaint(s):   Sinus Infection       ASSESSMENT/PLAN:  Below is the assessment and plan developed based on review of pertinent history, labs, studies, and medications. 1. Acute bacterial sinusitis  -     amoxicillin-clavulanate (AUGMENTIN) 875-125 mg per tablet; Take 1 Tablet by mouth two (2) times a day for 10 days. , Normal, Disp-20 Tablet, R-0  -counseled patient to make sure she continues using Flonase and nasal saline irrigation  -Patient is already on hydroxazine which should help with allergies. -Advised to call back should symptoms get worse. No follow-ups on file. SUBJECTIVE/OBJECTIVE:  HPI   This is a 69-year-old -American female with a past medical history significant for COPD, GERD, history of [facility] pneumonia, hyperlipidemia, hypertension, and sinusitis who is here for acute visit. Bacterial sinusitis  Patient states that for the past 2 to 3 weeks she has been having nasal congestion that has gotten progressively worse. On Giovanna Day she started noticing green-colored mucus drainage. She has been using Flonase, using nasal saline twice a day, taking Mucinex all of which is not helping to clear the sinus congestion. Patient states that she has had sinus infections in the past which were cleared up by antibiotics. Denies any headache, blurred vision, sore throat, shortness of breath, abdominal pain, nausea, vomiting, diarrhea, rash, sick contacts, recent travel. Denies any fevers and chills. Of note she has been also using her albuterol more often due to the nasal congestion which helps a little bit but not completely. Review of Systems   Constitutional: Negative for activity change, appetite change, fatigue and fever. HENT: Positive for congestion and sinus pressure. Respiratory: Positive for cough.  Negative for chest tightness, shortness of breath and wheezing. Cardiovascular: Negative for chest pain. Gastrointestinal: Negative for abdominal pain, diarrhea, nausea and vomiting. Skin: Negative for rash. Allergic/Immunologic: Negative for environmental allergies. Neurological: Negative for dizziness.         No data recorded     Physical Exam    [INSTRUCTIONS:  \"[x]\" Indicates a positive item  \"[]\" Indicates a negative item  -- DELETE ALL ITEMS NOT EXAMINED]    Constitutional: [x] Appears well-developed and well-nourished [x] No apparent distress      [] Abnormal -     Mental status: [x] Alert and awake  [x] Oriented to person/place/time [x] Able to follow commands    [] Abnormal -     Eyes:   EOM    [x]  Normal    [] Abnormal -   Sclera  [x]  Normal    [] Abnormal -          Discharge [x]  None visible   [] Abnormal -     HENT: [x] Normocephalic, atraumatic  [] Abnormal -   [x] Mouth/Throat: Mucous membranes are moist    External Ears [x] Normal  [] Abnormal -    Neck: [x] No visualized mass [] Abnormal -     Pulmonary/Chest: [x] Respiratory effort normal   [x] No visualized signs of difficulty breathing or respiratory distress        [] Abnormal -      Musculoskeletal:   [x] Normal gait with no signs of ataxia         [x] Normal range of motion of neck        [] Abnormal -     Neurological:        [x] No Facial Asymmetry (Cranial nerve 7 motor function) (limited exam due to video visit)          [x] No gaze palsy        [] Abnormal -          Skin:        [x] No significant exanthematous lesions or discoloration noted on facial skin         [] Abnormal -            Psychiatric:       [x] Normal Affect [] Abnormal -        [x] No Hallucinations    Other pertinent observable physical exam findings:-        On this date 12/27/2021 I have spent 15 minutes reviewing previous notes, test results and face to face (virtual) with the patient discussing the diagnosis and importance of compliance with the treatment plan as well as documenting on the day of the visit. Nash Aggarwal, was evaluated through a synchronous (real-time) audio-video encounter. The patient (or guardian if applicable) is aware that this is a billable service. Verbal consent to proceed has been obtained within the past 12 months. The visit was conducted pursuant to the emergency declaration under the [address] [place] [address] and the [employer] General Act. Patient identification was verified, and a caregiver was present when appropriate. The patient was located in a state where the provider was credentialed to provide care. An electronic signature was used to authenticate this note.   -- Alison Dior MD

## 2021-12-27 NOTE — TELEPHONE ENCOUNTER
Pt is requesting something to be called in for her sinus infection, she congested and feels a little tight in her chest. Advise pt that her provider is not in the office today and patient is asking if the provider that is in the office can send anything in for her sinus issues.  Please follow up with this pt to inform if something can be called in when available

## 2021-12-30 DIAGNOSIS — J32.9 SINUSITIS, UNSPECIFIED CHRONICITY, UNSPECIFIED LOCATION: ICD-10-CM

## 2022-01-02 RX ORDER — FLUTICASONE PROPIONATE 50 MCG
SPRAY, SUSPENSION (ML) NASAL
Qty: 16 G | Refills: 0 | Status: SHIPPED | OUTPATIENT
Start: 2022-01-02 | End: 2022-01-31

## 2022-01-18 DIAGNOSIS — F41.9 ANXIETY: ICD-10-CM

## 2022-01-18 RX ORDER — HYDROXYZINE 25 MG/1
TABLET, FILM COATED ORAL
Qty: 60 TABLET | Refills: 0 | Status: SHIPPED | OUTPATIENT
Start: 2022-01-18 | End: 2022-02-12

## 2022-01-30 DIAGNOSIS — J32.9 SINUSITIS, UNSPECIFIED CHRONICITY, UNSPECIFIED LOCATION: ICD-10-CM

## 2022-01-31 RX ORDER — FLUTICASONE PROPIONATE 50 MCG
SPRAY, SUSPENSION (ML) NASAL
Qty: 16 G | Refills: 0 | Status: SHIPPED | OUTPATIENT
Start: 2022-01-31 | End: 2022-03-02

## 2022-02-11 DIAGNOSIS — F41.9 ANXIETY: ICD-10-CM

## 2022-02-12 RX ORDER — HYDROXYZINE 25 MG/1
TABLET, FILM COATED ORAL
Qty: 60 TABLET | Refills: 0 | Status: SHIPPED | OUTPATIENT
Start: 2022-02-12 | End: 2022-03-07

## 2022-02-28 DIAGNOSIS — J32.9 SINUSITIS, UNSPECIFIED CHRONICITY, UNSPECIFIED LOCATION: ICD-10-CM

## 2022-03-02 ENCOUNTER — TELEPHONE (OUTPATIENT)
Dept: FAMILY MEDICINE CLINIC | Age: 67
End: 2022-03-02

## 2022-03-02 ENCOUNTER — NURSE TRIAGE (OUTPATIENT)
Dept: OTHER | Facility: CLINIC | Age: 67
End: 2022-03-02

## 2022-03-02 DIAGNOSIS — I10 ESSENTIAL HYPERTENSION: ICD-10-CM

## 2022-03-02 RX ORDER — AMOXICILLIN AND CLAVULANATE POTASSIUM 875; 125 MG/1; MG/1
1 TABLET, FILM COATED ORAL EVERY 12 HOURS
Qty: 20 TABLET | Refills: 0 | Status: SHIPPED | OUTPATIENT
Start: 2022-03-02 | End: 2022-03-12

## 2022-03-02 RX ORDER — FLUTICASONE PROPIONATE 50 MCG
SPRAY, SUSPENSION (ML) NASAL
Qty: 16 G | Refills: 0 | Status: SHIPPED | OUTPATIENT
Start: 2022-03-02 | End: 2022-04-28

## 2022-03-02 RX ORDER — HYDROCHLOROTHIAZIDE 12.5 MG/1
TABLET ORAL
Qty: 90 TABLET | Refills: 0 | Status: SHIPPED | OUTPATIENT
Start: 2022-03-02 | End: 2022-05-31

## 2022-03-02 NOTE — TELEPHONE ENCOUNTER
Requested Prescriptions     Pending Prescriptions Disp Refills    hydroCHLOROthiazide (HYDRODIURIL) 12.5 mg tablet [Pharmacy Med Name: hydroCHLOROthiazide 12.5 MG Oral Tablet] 90 Tablet 0     Sig: Take 1 tablet by mouth once daily   pt also is needs medication for coughing and mucus.  Please be advised

## 2022-03-02 NOTE — TELEPHONE ENCOUNTER
Pt stated she has a sinus infection and she would like for Dr. Kalpana Condon to prescribe her something for it. Ms. King Barnes can be reached at [phone]. Please advise.  Thank you!!!

## 2022-03-02 NOTE — TELEPHONE ENCOUNTER
Requested Prescriptions     Pending Prescriptions Disp Refills    hydroCHLOROthiazide (HYDRODIURIL) 12.5 mg tablet [Pharmacy Med Name: hydroCHLOROthiazide 12.5 MG Oral Tablet] 90 Tablet 0     Sig: Take 1 tablet by mouth once daily     Veronika Duarte called for their medication refill.     Last Office visit:  05-  Last labs:  07-  Follow up visit:  05-  Last date prescribe 12-    Please Advise

## 2022-03-02 NOTE — TELEPHONE ENCOUNTER
Received call from Kumar Cheung at [facility] with [employer]. Subjective: Caller states thinks she has a \"sinus flare up\",c/o intermittent wheezing and coughing up green sputum,mild SOB     Current Symptoms: more short of breath after coughing and on exertion, wheezing at times,cough productive green sputum    Onset: 2-3 days ago    Associated Symptoms: NA    Pain Severity: Denies    Temperature: Denies    What has been tried: Inhaler    LMP: NA Pregnant: NA    Recommended disposition: See in Office Today UCC/ER if no appt. available    Care advice provided, patient verbalizes understanding; denies any other questions or concerns; instructed to call back for any new or worsening symptoms. Patient/Caller agrees with recommended disposition; writer provided warm transfer to  at [facility] for appointment scheduling    Attention Provider: Thank you for allowing me to participate in the care of your patient. The patient was connected to triage in response to information provided to the [facility]. Please do not respond through this encounter as the response is not directed to a shared pool.     Reason for Disposition   MILD difficulty breathing (e.g., minimal/no SOB at rest, SOB with walking, pulse < 100) of new-onset or worse than normal    Protocols used: BREATHING DIFFICULTY-ADULT-[place]

## 2022-03-06 DIAGNOSIS — F41.9 ANXIETY: ICD-10-CM

## 2022-03-07 RX ORDER — HYDROXYZINE 25 MG/1
TABLET, FILM COATED ORAL
Qty: 60 TABLET | Refills: 0 | Status: SHIPPED | OUTPATIENT
Start: 2022-03-07 | End: 2022-04-04

## 2022-03-18 PROBLEM — K43.9 VENTRAL HERNIA WITHOUT OBSTRUCTION OR GANGRENE: Status: ACTIVE | Noted: 2018-04-02

## 2022-03-18 PROBLEM — E55.9 HYPOVITAMINOSIS D: Status: ACTIVE | Noted: 2018-04-19

## 2022-03-19 PROBLEM — J18.9 COMMUNITY ACQUIRED PNEUMONIA OF LEFT LOWER LOBE OF LUNG: Status: ACTIVE | Noted: 2018-03-02

## 2022-03-19 PROBLEM — J44.1 ACUTE EXACERBATION OF CHRONIC OBSTRUCTIVE PULMONARY DISEASE (HCC): Status: ACTIVE | Noted: 2017-05-29

## 2022-03-19 PROBLEM — E78.5 DYSLIPIDEMIA: Status: ACTIVE | Noted: 2018-08-31

## 2022-03-19 PROBLEM — Z72.0 TOBACCO ABUSE: Status: ACTIVE | Noted: 2018-02-08

## 2022-03-23 DIAGNOSIS — E78.5 DYSLIPIDEMIA: ICD-10-CM

## 2022-03-24 RX ORDER — ATORVASTATIN CALCIUM 10 MG/1
TABLET, FILM COATED ORAL
Qty: 90 TABLET | Refills: 0 | Status: SHIPPED | OUTPATIENT
Start: 2022-03-24 | End: 2022-06-22 | Stop reason: SDUPTHER

## 2022-03-31 DIAGNOSIS — F41.9 ANXIETY: ICD-10-CM

## 2022-04-04 RX ORDER — HYDROXYZINE 25 MG/1
TABLET, FILM COATED ORAL
Qty: 60 TABLET | Refills: 0 | Status: SHIPPED | OUTPATIENT
Start: 2022-04-04 | End: 2022-04-06

## 2022-04-05 DIAGNOSIS — F41.9 ANXIETY: ICD-10-CM

## 2022-04-06 RX ORDER — HYDROXYZINE 25 MG/1
TABLET, FILM COATED ORAL
Qty: 60 TABLET | Refills: 0 | Status: SHIPPED | OUTPATIENT
Start: 2022-04-06 | End: 2022-05-31

## 2022-04-15 ENCOUNTER — TELEPHONE (OUTPATIENT)
Dept: FAMILY MEDICINE CLINIC | Age: 67
End: 2022-04-15

## 2022-04-15 NOTE — TELEPHONE ENCOUNTER
Patient called advising she is having a ;ot of sinus pressure/headaches. She has tried flonase and several things over the counter and nothing has worked. Patient would like antibiotic called in, states that is how it was done last time. (Patient is addiment that this office has a 24 hour on call provider)   Advised her that there was no provider in the office today, and she should go to Urgent Care for evaluation and treatment. Patient refused. Patient was informed to try hot shower and continue tylenol, Kenia Alcantara would get the message on Monday morning.     Patient verbalized understanding

## 2022-04-17 RX ORDER — AMOXICILLIN AND CLAVULANATE POTASSIUM 875; 125 MG/1; MG/1
1 TABLET, FILM COATED ORAL EVERY 12 HOURS
Qty: 20 TABLET | Refills: 0 | Status: SHIPPED | OUTPATIENT
Start: 2022-04-17 | End: 2022-04-27

## 2022-04-27 DIAGNOSIS — J32.9 SINUSITIS, UNSPECIFIED CHRONICITY, UNSPECIFIED LOCATION: ICD-10-CM

## 2022-04-28 RX ORDER — FLUTICASONE PROPIONATE 50 MCG
SPRAY, SUSPENSION (ML) NASAL
Qty: 16 G | Refills: 0 | Status: SHIPPED | OUTPATIENT
Start: 2022-04-28 | End: 2022-09-13 | Stop reason: SDUPTHER

## 2022-05-17 ENCOUNTER — TELEPHONE (OUTPATIENT)
Dept: MAMMOGRAPHY | Age: 67
End: 2022-05-17

## 2022-05-17 NOTE — TELEPHONE ENCOUNTER
Spoke with this pt concerning Scheduling a Mammogram , pt decline she has her imaging's done at [facility]

## 2022-05-27 DIAGNOSIS — F41.9 ANXIETY: ICD-10-CM

## 2022-05-31 ENCOUNTER — OFFICE VISIT (OUTPATIENT)
Dept: FAMILY MEDICINE CLINIC | Age: 67
End: 2022-05-31
Payer: MEDICARE

## 2022-05-31 VITALS
WEIGHT: 164 LBS | HEART RATE: 109 BPM | HEIGHT: 62 IN | OXYGEN SATURATION: 93 % | RESPIRATION RATE: 18 BRPM | TEMPERATURE: 97.2 F | SYSTOLIC BLOOD PRESSURE: 108 MMHG | DIASTOLIC BLOOD PRESSURE: 59 MMHG | BODY MASS INDEX: 30.18 KG/M2

## 2022-05-31 DIAGNOSIS — K21.9 GASTROESOPHAGEAL REFLUX DISEASE, UNSPECIFIED WHETHER ESOPHAGITIS PRESENT: ICD-10-CM

## 2022-05-31 DIAGNOSIS — R00.0 TACHYCARDIA: Primary | ICD-10-CM

## 2022-05-31 DIAGNOSIS — J44.1 CHRONIC OBSTRUCTIVE PULMONARY DISEASE WITH ACUTE EXACERBATION (HCC): ICD-10-CM

## 2022-05-31 DIAGNOSIS — I10 ESSENTIAL HYPERTENSION: ICD-10-CM

## 2022-05-31 DIAGNOSIS — F39 MOOD DISORDER (HCC): ICD-10-CM

## 2022-05-31 DIAGNOSIS — Z23 ENCOUNTER FOR IMMUNIZATION: ICD-10-CM

## 2022-05-31 DIAGNOSIS — G47.00 INSOMNIA, UNSPECIFIED TYPE: ICD-10-CM

## 2022-05-31 DIAGNOSIS — Z00.00 MEDICARE ANNUAL WELLNESS VISIT, SUBSEQUENT: ICD-10-CM

## 2022-05-31 DIAGNOSIS — J32.4 PANSINUSITIS, UNSPECIFIED CHRONICITY: ICD-10-CM

## 2022-05-31 DIAGNOSIS — E78.5 DYSLIPIDEMIA: ICD-10-CM

## 2022-05-31 DIAGNOSIS — E07.9 THYROID DISORDER: ICD-10-CM

## 2022-05-31 DIAGNOSIS — Z13.31 SCREENING FOR DEPRESSION: ICD-10-CM

## 2022-05-31 PROBLEM — J45.909 ASTHMA WITHOUT STATUS ASTHMATICUS: Status: ACTIVE | Noted: 2018-02-08

## 2022-05-31 PROBLEM — E78.00 PURE HYPERCHOLESTEROLEMIA: Status: ACTIVE | Noted: 2018-02-08

## 2022-05-31 PROBLEM — R05.9 COUGH: Status: ACTIVE | Noted: 2022-05-31

## 2022-05-31 PROBLEM — Q61.9 CONGENITAL CYSTIC KIDNEY DISEASE: Status: ACTIVE | Noted: 2018-02-08

## 2022-05-31 PROBLEM — R42 DIZZINESS AND GIDDINESS: Status: ACTIVE | Noted: 2018-01-25

## 2022-05-31 PROBLEM — J31.0 RHINITIS: Status: ACTIVE | Noted: 2022-05-31

## 2022-05-31 PROBLEM — H61.20 IMPACTED CERUMEN: Status: ACTIVE | Noted: 2018-01-25

## 2022-05-31 PROBLEM — H93.299 ABNORMAL AUDITORY PERCEPTION: Status: ACTIVE | Noted: 2018-01-25

## 2022-05-31 PROBLEM — E04.9 NON-TOXIC GOITER: Status: ACTIVE | Noted: 2018-02-08

## 2022-05-31 PROBLEM — D64.9 ANEMIA: Status: ACTIVE | Noted: 2018-02-08

## 2022-05-31 PROCEDURE — G8510 SCR DEP NEG, NO PLAN REQD: HCPCS | Performed by: FAMILY MEDICINE

## 2022-05-31 PROCEDURE — G9899 SCRN MAM PERF RSLTS DOC: HCPCS | Performed by: FAMILY MEDICINE

## 2022-05-31 PROCEDURE — G8417 CALC BMI ABV UP PARAM F/U: HCPCS | Performed by: FAMILY MEDICINE

## 2022-05-31 PROCEDURE — G8752 SYS BP LESS 140: HCPCS | Performed by: FAMILY MEDICINE

## 2022-05-31 PROCEDURE — 90715 TDAP VACCINE 7 YRS/> IM: CPT | Performed by: FAMILY MEDICINE

## 2022-05-31 PROCEDURE — 93000 ELECTROCARDIOGRAM COMPLETE: CPT | Performed by: FAMILY MEDICINE

## 2022-05-31 PROCEDURE — G8754 DIAS BP LESS 90: HCPCS | Performed by: FAMILY MEDICINE

## 2022-05-31 PROCEDURE — 99214 OFFICE O/P EST MOD 30 MIN: CPT | Performed by: FAMILY MEDICINE

## 2022-05-31 PROCEDURE — 1123F ACP DISCUSS/DSCN MKR DOCD: CPT | Performed by: FAMILY MEDICINE

## 2022-05-31 PROCEDURE — G8399 PT W/DXA RESULTS DOCUMENT: HCPCS | Performed by: FAMILY MEDICINE

## 2022-05-31 PROCEDURE — 1101F PT FALLS ASSESS-DOCD LE1/YR: CPT | Performed by: FAMILY MEDICINE

## 2022-05-31 PROCEDURE — 3017F COLORECTAL CA SCREEN DOC REV: CPT | Performed by: FAMILY MEDICINE

## 2022-05-31 PROCEDURE — G0444 DEPRESSION SCREEN ANNUAL: HCPCS | Performed by: FAMILY MEDICINE

## 2022-05-31 PROCEDURE — G0439 PPPS, SUBSEQ VISIT: HCPCS | Performed by: FAMILY MEDICINE

## 2022-05-31 PROCEDURE — 1090F PRES/ABSN URINE INCON ASSESS: CPT | Performed by: FAMILY MEDICINE

## 2022-05-31 PROCEDURE — G8536 NO DOC ELDER MAL SCRN: HCPCS | Performed by: FAMILY MEDICINE

## 2022-05-31 PROCEDURE — G8427 DOCREV CUR MEDS BY ELIG CLIN: HCPCS | Performed by: FAMILY MEDICINE

## 2022-05-31 RX ORDER — HYDROXYZINE 25 MG/1
TABLET, FILM COATED ORAL
Qty: 60 TABLET | Refills: 0 | Status: SHIPPED | OUTPATIENT
Start: 2022-05-31 | End: 2022-06-22 | Stop reason: SDUPTHER

## 2022-05-31 NOTE — TELEPHONE ENCOUNTER
Requested Prescriptions     Pending Prescriptions Disp Refills    hydrOXYzine HCL (ATARAX) 25 mg tablet [Pharmacy Med Name: hydrOXYzine HCl 25 MG Oral Tablet] 60 Tablet 0     Sig: TAKE 1 TABLET BY MOUTH THREE TIMES DAILY AS NEEDED FOR ANXIETY

## 2022-05-31 NOTE — PATIENT INSTRUCTIONS
Medicare Wellness Visit, Female     The best way to live healthy is to have a lifestyle where you eat a well-balanced diet, exercise regularly, limit alcohol use, and quit all forms of tobacco/nicotine, if applicable. Regular preventive services are another way to keep healthy. Preventive services (vaccines, screening tests, monitoring & exams) can help personalize your care plan, which helps you manage your own care. Screening tests can find health problems at the earliest stages, when they are easiest to treat. [facility] follows the current, evidence-based guidelines published by the [facility] ([facility]) when recommending preventive services for our patients. Because we follow these guidelines, sometimes recommendations change over time as research supports it. (For example, mammograms used to be recommended annually. Even though Medicare will still pay for an annual mammogram, the newer guidelines recommend a mammogram every two years for women of average risk). Of course, you and your doctor may decide to screen more often for some diseases, based on your risk and your co-morbidities (chronic disease you are already diagnosed with). Preventive services for you include:  - Medicare offers their members a free annual wellness visit, which is time for you and your primary care provider to discuss and plan for your preventive service needs. Take advantage of this benefit every year!  -All adults over the age of 72 should receive the recommended pneumonia vaccines. Current USPSTF guidelines recommend a series of two vaccines for the best pneumonia protection.   -All adults should have a flu vaccine yearly and a tetanus vaccine every 10 years.   -All adults age 48 and older should receive the shingles vaccines (series of two vaccines).       -All adults age 38-68 who are overweight should have a diabetes screening test once every three years.   -All adults born between 80 and 1965 should be screened once for Hepatitis C.  -Other screening tests and preventive services for persons with diabetes include: an eye exam to screen for diabetic retinopathy, a kidney function test, a foot exam, and stricter control over your cholesterol.   -Cardiovascular screening for adults with routine risk involves an electrocardiogram (ECG) at intervals determined by your doctor.   -Colorectal cancer screenings should be done for adults age 54-65 with no increased risk factors for colorectal cancer. There are a number of acceptable methods of screening for this type of cancer. Each test has its own benefits and drawbacks. Discuss with your doctor what is most appropriate for you during your annual wellness visit. The different tests include: colonoscopy (considered the best screening method), a fecal occult blood test, a fecal DNA test, and sigmoidoscopy.    -A bone mass density test is recommended when a woman turns 65 to screen for osteoporosis. This test is only recommended one time, as a screening. Some providers will use this same test as a disease monitoring tool if you already have osteoporosis. -Breast cancer screenings are recommended every other year for women of normal risk, age 54-69.  -Cervical cancer screenings for women over age 72 are only recommended with certain risk factors.      Here is a list of your current Health Maintenance items (your personalized list of preventive services) with a due date:  Health Maintenance Due   Topic Date Due    DTaP/Tdap/Td  (1 - Tdap) Never done    Shingles Vaccine (1 of 2) Never done    Mammogram  07/26/2021    COVID-19 Vaccine (3 - Booster for Mckeon Peter series) 08/30/2021    Cholesterol Test   04/27/2022    Annual Well Visit  05/28/2022         Medicare Wellness Visit, Female     The best way to live healthy is to have a lifestyle where you eat a well-balanced diet, exercise regularly, limit alcohol use, and quit all forms of tobacco/nicotine, if applicable. Regular preventive services are another way to keep healthy. Preventive services (vaccines, screening tests, monitoring & exams) can help personalize your care plan, which helps you manage your own care. Screening tests can find health problems at the earliest stages, when they are easiest to treat. [facility] follows the current, evidence-based guidelines published by the [facility] ([facility]) when recommending preventive services for our patients. Because we follow these guidelines, sometimes recommendations change over time as research supports it. (For example, mammograms used to be recommended annually. Even though Medicare will still pay for an annual mammogram, the newer guidelines recommend a mammogram every two years for women of average risk). Of course, you and your doctor may decide to screen more often for some diseases, based on your risk and your co-morbidities (chronic disease you are already diagnosed with). Preventive services for you include:  - Medicare offers their members a free annual wellness visit, which is time for you and your primary care provider to discuss and plan for your preventive service needs. Take advantage of this benefit every year!  -All adults over the age of 72 should receive the recommended pneumonia vaccines. Current USPSTF guidelines recommend a series of two vaccines for the best pneumonia protection.   -All adults should have a flu vaccine yearly and a tetanus vaccine every 10 years.   -All adults age 48 and older should receive the shingles vaccines (series of two vaccines).       -All adults age 38-68 who are overweight should have a diabetes screening test once every three years.   -All adults born between 80 and 1965 should be screened once for Hepatitis C.  -Other screening tests and preventive services for persons with diabetes include: an eye exam to screen for diabetic retinopathy, a kidney function test, a foot exam, and stricter control over your cholesterol.   -Cardiovascular screening for adults with routine risk involves an electrocardiogram (ECG) at intervals determined by your doctor.   -Colorectal cancer screenings should be done for adults age 54-65 with no increased risk factors for colorectal cancer. There are a number of acceptable methods of screening for this type of cancer. Each test has its own benefits and drawbacks. Discuss with your doctor what is most appropriate for you during your annual wellness visit. The different tests include: colonoscopy (considered the best screening method), a fecal occult blood test, a fecal DNA test, and sigmoidoscopy.    -A bone mass density test is recommended when a woman turns 65 to screen for osteoporosis. This test is only recommended one time, as a screening. Some providers will use this same test as a disease monitoring tool if you already have osteoporosis. -Breast cancer screenings are recommended every other year for women of normal risk, age 54-69.  -Cervical cancer screenings for women over age 72 are only recommended with certain risk factors.      Here is a list of your current Health Maintenance items (your personalized list of preventive services) with a due date:  Health Maintenance Due   Topic Date Due    DTaP/Tdap/Td  (1 - Tdap) Never done    Shingles Vaccine (1 of 2) Never done    Mammogram  07/26/2021    COVID-19 Vaccine (3 - Booster for Pfizer series) 08/30/2021    Cholesterol Test   04/27/2022

## 2022-05-31 NOTE — PROGRESS NOTES
This is the Subsequent Medicare Annual Wellness Exam, performed 12 months or more after the Initial AWV or the last Subsequent AWV    I have reviewed the patient's medical history in detail and updated the computerized patient record. Chief Complaint   Patient presents with    Annual Wellness Visit    Wheezing     1. \"Have you been to the ER, urgent care clinic since your last visit? Hospitalized since your last visit? \" No    2. \"Have you seen or consulted any other health care providers outside of the [address] [place] [address] since your last visit? \" No     3. For patients aged 39-70: Has the patient had a colonoscopy / FIT/ Cologuard? Yes - no Care Gap present      If the patient is female:    4. For patients aged 41-77: Has the patient had a mammogram within the past 2 years? No      5. For patients aged 21-65: Has the patient had a pap smear? NA - based on age or sex      Assessment/Plan   Education and counseling provided:  Are appropriate based on today's review and evaluation  Screening Mammography    1. Medicare annual wellness visit, subsequent    2. Encounter for immunization  - TETANUS, DIPHTHERIA TOXOIDS AND ACELLULAR PERTUSSIS VACCINE (TDAP), IN INDIVIDS. >=7, IM    3.  Screening for depression  - [address]  ANNUAL       Depression Risk Factor Screening     3 most recent PHQ Screens 5/31/2022   Little interest or pleasure in doing things Not at all   Feeling down, depressed, irritable, or hopeless Not at all   Total Score PHQ 2 0   Trouble falling or staying asleep, or sleeping too much Several days   Feeling tired or having little energy Not at all   Poor appetite, weight loss, or overeating Not at all   Feeling bad about yourself - or that you are a failure or have let yourself or your family down Not at all   Trouble concentrating on things such as school, work, reading, or watching TV Not at all   Moving or speaking so slowly that other people could have noticed; or the opposite being so fidgety that others notice Not at all   Thoughts of being better off dead, or hurting yourself in some way Not at all   PHQ 9 Score 1   How difficult have these problems made it for you to do your work, take care of your home and get along with others Not difficult at all   8 minutes taken on depression screening. Alcohol & Drug Abuse Risk Screen    Do you average more than 1 drink per night or more than 7 drinks a week:  No    On any one occasion in the past three months have you have had more than 3 drinks containing alcohol:  No          Functional Ability and Level of Safety    Hearing: Hearing is good. Activities of Daily Living: The home contains: no safety equipment. Patient does total self care      Ambulation: with no difficulty     Fall Risk:  Fall Risk Assessment, last 12 mths 5/31/2022   Able to walk? Yes   Fall in past 12 months? 0   Do you feel unsteady? 0   Are you worried about falling 0   Number of falls in past 12 months -   Fall with injury?  -      Abuse Screen:  Patient is not abused       Cognitive Screening    Has your family/caregiver stated any concerns about your memory: no     Cognitive Screening: Normal - MMSE (Mini Mental Status Exam)    Health Maintenance Due     Health Maintenance Due   Topic Date Due    DTaP/Tdap/Td series (1 - Tdap) Never done    Shingrix Vaccine Age 50> (1 of 2) Never done    Breast Cancer Screen Mammogram  07/26/2021    COVID-19 Vaccine (3 - Booster for Mckeon Peter series) 08/30/2021    Lipid Screen  04/27/2022    Medicare Yearly Exam  05/28/2022       Patient Care Team   Patient Care Team:  Rahul Cuellar MD as PCP - General (Family Medicine)  Rahul Cuellar MD [facility] PCP - [facility]  Blaise Haynes MD (Family Medicine)  Radha hSeffield MD (Surgery Physician)  Faraz Caraballo PA-C (Physician Assistant)  Meghana Higginbotham MD (Ophthalmology)  Blaine Villarreal MD (Pulmonary Disease)  Melene Harada, MD (Gastroenterology)    History   Cordell So comes in for Medicare wellness exam.    Patient Active Problem List   Diagnosis Code    Vomiting R11.10    Hemorrhoids K64.9    Back pain M54.9    GERD (gastroesophageal reflux disease) K21.9    H/O thyroid nodule Z86.39    Renal cyst, left N28.1    Tobacco abuse Z72.0    Hypovitaminosis D E55.9    Dyslipidemia E78.5    Acute exacerbation of chronic obstructive pulmonary disease ([address] J44.1    Community acquired pneumonia of left lower lobe of lung J18.9    Ventral hernia without obstruction or gangrene K43.9    Abnormal auditory perception H93.299    Anemia D64.9    Asthma without status asthmaticus J45.909    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    Cough R05.9    Disorder of thyroid gland E07.9    Dizziness and giddiness R42    Hyperlipidemia E78.5    Impacted cerumen H61.20    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    Pure hypercholesterolemia E78.00    Rhinitis J31.0     Past Medical History:   Diagnosis Date    COPD (chronic obstructive pulmonary disease) (HCC)     GERD (gastroesophageal reflux disease)     HTN (hypertension)     Hyperlipidemia     Hypertension       Past Surgical History:   Procedure Laterality Date    COLONOSCOPY N/A 9/2/2021    COLONOSCOPY performed by Reji Means MD at [facility] ENDOSCOPY    HX COLONOSCOPY      HX ENDOSCOPY      HX HEENT  2011    Partial thyroid    HX HYSTERECTOMY      HX ROTATOR CUFF REPAIR  2005    left shoulder sugery    HX THYROIDECTOMY Left      Current Outpatient Medications   Medication Sig Dispense Refill    hydrOXYzine HCL (ATARAX) 25 mg tablet TAKE 1 TABLET BY MOUTH THREE TIMES DAILY AS NEEDED FOR ANXIETY 60 Tablet 0    fluticasone propionate (FLONASE) 50 mcg/actuation nasal spray Use 2 spray(s) in each nostril once daily 16 g 0    atorvastatin (LIPITOR) 10 mg tablet Take 1 tablet by mouth once daily 90 Tablet 0    hydroCHLOROthiazide (HYDRODIURIL) 12.5 mg tablet Take 1 tablet by mouth once daily 90 Tablet 0    PARoxetine (PAXIL) 10 mg tablet Take 1 Tablet by mouth daily. 90 Tablet 1    methylPREDNISolone (MEDROL DOSEPACK) 4 mg tablet Take tapering dose as prescribed 1 Dose Pack 0    traZODone (DESYREL) 50 mg tablet Take 1 tablet by mouth nightly 30 Tablet 2    Oxygen 2LPM nc HS      Trulance 3 mg tab Take 3 mg by mouth daily.  albuterol (PROVENTIL) 5 mg/mL nebulizer solution Take 2.5 mg by inhalation two (2) times a day.  umeclidinium-vilanterol (ANORO ELLIPTA) 62.5-25 mcg/actuation inhaler Take 1 Puff by inhalation daily.  aspirin delayed-release 81 mg tablet 81 mg.      albuterol (PROVENTIL HFA, VENTOLIN HFA, PROAIR HFA) 90 mcg/actuation inhaler Take  by inhalation.  omeprazole (PRILOSEC) 20 mg capsule Take 20 mg by mouth daily. Allergies   Allergen Reactions    Latex, Natural Rubber Rash       Family History   Problem Relation Age of Onset    Diabetes Mother     Hypertension Mother     Cancer Father     Hypertension Father      Social History     Tobacco Use    Smoking status: Former Smoker     Packs/day: 0.30     Years: 27.00     Pack years: 8.10     Types: Cigarettes    Smokeless tobacco: Never Used   Substance Use Topics    Alcohol use: Yes     Alcohol/week: 0.0 standard drinks     Comment: rarely     I have discussed the diagnosis with the patient and the intended plan of care as seen in the above orders. The patient has received an after-visit summary and questions were answered concerning future plans. I have discussed medication, side effects, and warnings with the patient in detail. The patient verbalized understanding and is in agreement with the plan of care. The patient will contact the office with any additional concerns. Personalized preventative plan of care was discussed, printed and given to patient.     Franny Garcia MD

## 2022-05-31 NOTE — PROGRESS NOTES
HPI  Shravan Reid comes in for f/u care. HTN: Patient has a history of hypertension. She has been on HCTZ. States that her blood pressure is usually on the lower end of the spectrum. She occasionally gets dizziness. She would like to hold off on the blood pressure medication. I will stop the HCTZ. She will keep a blood pressure log and we will follow-up at next visit. Tachycardia: Patient has been having fluctuating heart rate. Today heart rate was quite elevated but on recheck it had come down to 93. She has a history of sinus tachycardia. Patient states that she has been keeping up with her fluid intake. She denies palpitations, chest pain, shortness of breath or syncope. I will do an EKG and check labs. I will follow-up with the results. Mood disorder: Patient has mood disorder with anxiety. Has recently been bereaved but feels that she is not depressed. She is on hydroxyzine as needed. She also takes Paxil and trazodone. She will continue with these medications. COPD: Patient has COPD. She is on albuterol inhaler and Anoro Ellipta. Denies shortness of breath, chest pain or hemoptysis. We will continue current management plan. Insomnia: Patient has insomnia. She takes trazodone. Stable on the medication. Continue current management plan. Sinus congestion: Patient has history of chronic sinus congestion. She has been seen by the ENT doctor in the past.  She is currently on Flonase. She should continue with this medication. Denies frontal maxillary sinus pressure or pain. Dyslipidemia: Patient has dyslipidemia. We will recheck lipid panel. She will take a diet low in polysaturated fats. GERD: Patient has gastroesophageal reflux disease. She is on Prilosec. Denies hematemesis or dark stools. Continue current treatment plan. Obesity: Patient has a BMI of 30.00. She will intensify lifestyle and dietary modification.   Health maintenance: Patient will have the tetanus vaccine given today. Past Medical History  Past Medical History:   Diagnosis Date    COPD (chronic obstructive pulmonary disease) ([address]     GERD (gastroesophageal reflux disease)     HTN (hypertension)     Hyperlipidemia     Hypertension        Surgical History  Past Surgical History:   Procedure Laterality Date    COLONOSCOPY N/A 9/2/2021    COLONOSCOPY performed by Elmer Braga MD at [facility] ENDOSCOPY    HX COLONOSCOPY      HX ENDOSCOPY      HX HEENT  2011    Partial thyroid    HX HYSTERECTOMY      HX ROTATOR CUFF REPAIR  2005    left shoulder sugery    HX THYROIDECTOMY Left         Medications  Current Outpatient Medications   Medication Sig Dispense Refill    hydrOXYzine HCL (ATARAX) 25 mg tablet TAKE 1 TABLET BY MOUTH THREE TIMES DAILY AS NEEDED FOR ANXIETY 60 Tablet 0    fluticasone propionate (FLONASE) 50 mcg/actuation nasal spray Use 2 spray(s) in each nostril once daily 16 g 0    atorvastatin (LIPITOR) 10 mg tablet Take 1 tablet by mouth once daily 90 Tablet 0    hydroCHLOROthiazide (HYDRODIURIL) 12.5 mg tablet Take 1 tablet by mouth once daily 90 Tablet 0    PARoxetine (PAXIL) 10 mg tablet Take 1 Tablet by mouth daily. 90 Tablet 1    methylPREDNISolone (MEDROL DOSEPACK) 4 mg tablet Take tapering dose as prescribed 1 Dose Pack 0    traZODone (DESYREL) 50 mg tablet Take 1 tablet by mouth nightly 30 Tablet 2    Oxygen 2LPM nc HS      Trulance 3 mg tab Take 3 mg by mouth daily.  albuterol (PROVENTIL) 5 mg/mL nebulizer solution Take 2.5 mg by inhalation two (2) times a day.  umeclidinium-vilanterol (ANORO ELLIPTA) 62.5-25 mcg/actuation inhaler Take 1 Puff by inhalation daily.  aspirin delayed-release 81 mg tablet 81 mg.      albuterol (PROVENTIL HFA, VENTOLIN HFA, PROAIR HFA) 90 mcg/actuation inhaler Take  by inhalation.  omeprazole (PRILOSEC) 20 mg capsule Take 20 mg by mouth daily.          Allergies  Allergies   Allergen Reactions    Latex, Natural Rubber Rash Family History  Family History   Problem Relation Age of Onset    Diabetes Mother     Hypertension Mother     Cancer Father     Hypertension Father        Social History  Social History     Socioeconomic History    Marital status: SINGLE     Spouse name: Not on file    Number of children: Not on file    Years of education: Not on file    Highest education level: Not on file   Occupational History    Not on file   Tobacco Use    Smoking status: Former Smoker     Packs/day: 0.30     Years: 27.00     Pack years: 8.10     Types: Cigarettes    Smokeless tobacco: Never Used   Vaping Use    Vaping Use: Never used   Substance and Sexual Activity    Alcohol use: Yes     Alcohol/week: 0.0 standard drinks     Comment: rarely    Drug use: No    Sexual activity: Never   Other Topics Concern    Not on file   Social History Narrative    ** Merged History Encounter **          Social Determinants of Health     Financial Resource Strain:     Difficulty of Paying Living Expenses: Not on file   Food Insecurity:     Worried About Running Out of Food in the Last Year: Not on file    Kd of Food in the Last Year: Not on file   Transportation Needs:     Lack of Transportation (Medical): Not on file    Lack of Transportation (Non-Medical):  Not on file   Physical Activity:     Days of Exercise per Week: Not on file    Minutes of Exercise per Session: Not on file   Stress:     Feeling of Stress : Not on file   Social Connections:     Frequency of Communication with Friends and Family: Not on file    Frequency of Social Gatherings with Friends and Family: Not on file    Attends Yarsanism Services: Not on file    Active Member of Clubs or Organizations: Not on file    Attends Club or Organization Meetings: Not on file    Marital Status: Not on file   Intimate Partner Violence:     Fear of Current or Ex-Partner: Not on file    Emotionally Abused: Not on file    Physically Abused: Not on file   [employer] Sexually Abused: Not on file   Housing Stability:     Unable to Pay for Housing in the Last Year: Not on file    Number of Places Lived in the Last Year: Not on file    Unstable Housing in the Last Year: Not on file       Review of Systems  Review of Systems - Review of all systems is negative except as noted above in the HPI. Vital Signs  Visit Vitals  BP (!) 108/59 (BP 1 Location: Left upper arm, BP Patient Position: Sitting, BP Cuff Size: Adult)   Pulse (!) 109   Temp 97.2 °F (36.2 °C) (Temporal)   Resp 18   Ht 5' 2\" (1.575 m)   Wt 164 lb (74.4 kg)   LMP 05/08/1981   SpO2 93%   BMI 30.00 kg/m²       Physical Exam  Physical Examination: General appearance - alert, well appearing, and in no distress, oriented to person, place, and time, overweight and acyanotic, in no respiratory distress  Mental status - alert, oriented to person, place, and time, affect appropriate to mood  Lymphatics - no palpable lymphadenopathy, no hepatosplenomegaly  Chest - clear to auscultation, no wheezes, rales or rhonchi, symmetric air entry  Heart - tachycardia, systolic murmur 2/6 at lower left sternal border  Abdomen - soft, nontender, nondistended, no masses or organomegaly  Back exam - limited range of motion  Neurological - abnormal neurological exam unchanged from prior examinations  Musculoskeletal - osteoarthritic changes noted in both hands  Extremities - no pedal edema noted, intact peripheral pulses      Results  Results for orders placed or performed during the hospital encounter of 08/27/21   NOVEL CORONAVIRUS (COVID-19)   Result Value Ref Range    SARS-CoV-2 Not Detected Not Detected         ASSESSMENT and PLAN    ICD-10-CM ICD-9-CM    1. Tachycardia  R00.0 785.0 AMB POC EKG ROUTINE W/ 12 LEADS, INTER & REP      TSH 3RD GENERATION      MAGNESIUM   2. Mood disorder (HCC)  F39 296.90    3.  Dyslipidemia  E78.5 272.4 LIPID PANEL   4. Essential hypertension  I10 401.9 CBC WITH AUTOMATED DIFF      METABOLIC PANEL, COMPREHENSIVE   5. Gastroesophageal reflux disease, unspecified whether esophagitis present  K21.9 530.81    6. Insomnia, unspecified type  G47.00 780.52    7. Chronic obstructive pulmonary disease with acute exacerbation (HCC)  J44.1 491.21    8. Thyroid disorder  E07.9 246.9 TSH 3RD GENERATION   9. Pansinusitis, unspecified chronicity  J32.4 473.8    10. Encounter for immunization  Z23 V03.89 TETANUS, DIPHTHERIA TOXOIDS AND ACELLULAR PERTUSSIS VACCINE (TDAP), IN INDIVIDS. >=7, IM     lab results and schedule of future lab studies reviewed with patient  reviewed diet, exercise and weight control  cardiovascular risk and specific lipid/LDL goals reviewed  reviewed medications and side effects in detail  radiology results and schedule of future radiology studies reviewed with patient      I have discussed the diagnosis with the patient and the intended plan of care as seen in the above orders. The patient has received an after-visit summary and questions were answered concerning future plans. I have discussed medication, side effects, and warnings with the patient in detail. The patient verbalized understanding and is in agreement with the plan of care. The patient will contact the office with any additional concerns. Sammi Cook MD    PLEASE NOTE:   This document has been produced using voice recognition software.  Unrecognized errors in transcription may be present

## 2022-06-21 ENCOUNTER — TELEPHONE (OUTPATIENT)
Dept: FAMILY MEDICINE CLINIC | Age: 67
End: 2022-06-21

## 2022-06-21 NOTE — TELEPHONE ENCOUNTER
----- Message from Jennifer Momin sent at 6/21/2022  8:46 AM EDT -----  Subject: Refill Request    QUESTIONS  Name of Medication? PARoxetine (PAXIL) 10 mg tablet  Patient-reported dosage and instructions? Once a day  How many days do you have left? 14  Preferred Pharmacy? [facility]  Pharmacy phone number (if available)? [phone]  ---------------------------------------------------------------------------  --------------,  Name of Medication? atorvastatin (LIPITOR) 10 mg tablet  Patient-reported dosage and instructions? Once a day  How many days do you have left? 14  Preferred Pharmacy? [facility] phone number (if available)? [phone]  ---------------------------------------------------------------------------  --------------,  Name of Medication? hydrOXYzine HCL (ATARAX) 25 mg tablet  Patient-reported dosage and instructions? As needed  How many days do you have left? 14  Preferred Pharmacy? [facility]  Pharmacy phone number (if available)? [phone]  ---------------------------------------------------------------------------  --------------  CALL BACK INFO  What is the best way for the office to contact you? OK to leave message on   voicemail  Preferred Call Back Phone Number? [phone]  ---------------------------------------------------------------------------  --------------  SCRIPT ANSWERS  Relationship to Patient?  Self

## 2022-06-22 DIAGNOSIS — E78.5 DYSLIPIDEMIA: ICD-10-CM

## 2022-06-22 DIAGNOSIS — N95.1 MENOPAUSAL AND FEMALE CLIMACTERIC STATES: ICD-10-CM

## 2022-06-22 DIAGNOSIS — F41.9 ANXIETY: ICD-10-CM

## 2022-06-22 RX ORDER — HYDROXYZINE 25 MG/1
25 TABLET, FILM COATED ORAL
Qty: 60 TABLET | Refills: 2 | Status: SHIPPED | OUTPATIENT
Start: 2022-06-22 | End: 2022-09-12

## 2022-06-22 RX ORDER — ATORVASTATIN CALCIUM 10 MG/1
10 TABLET, FILM COATED ORAL DAILY
Qty: 90 TABLET | Refills: 1 | Status: SHIPPED | OUTPATIENT
Start: 2022-06-22

## 2022-06-22 RX ORDER — PAROXETINE 10 MG/1
10 TABLET, FILM COATED ORAL DAILY
Qty: 90 TABLET | Refills: 1 | Status: SHIPPED | OUTPATIENT
Start: 2022-06-22

## 2022-06-22 NOTE — TELEPHONE ENCOUNTER
PARoxetine (PAXIL) 10 mg tablet  90 Tab 1 Refill   Last Prescribed 12/02/2021    atorvastatin (LIPITOR) 10 mg tablet  90 Tab 0 Refill   Last Prescribed 3/24/2022     hydrOXYzine HCL (ATARAX) 25 mg tablet  60 tab 0 refills  Last Prescribed 5/31/2022    Last Seen 5/31/2022

## 2022-06-22 NOTE — TELEPHONE ENCOUNTER
----- Message from Lena Waddell sent at 6/21/2022  8:46 AM EDT -----  Subject: Refill Request    QUESTIONS  Name of Medication? PARoxetine (PAXIL) 10 mg tablet  Patient-reported dosage and instructions? Once a day  How many days do you have left? 14  Preferred Pharmacy? [facility]  Pharmacy phone number (if available)? [phone]  ---------------------------------------------------------------------------  --------------,  Name of Medication? atorvastatin (LIPITOR) 10 mg tablet  Patient-reported dosage and instructions? Once a day  How many days do you have left? 14  Preferred Pharmacy? [facility] phone number (if available)? [phone]  ---------------------------------------------------------------------------  --------------,  Name of Medication? hydrOXYzine HCL (ATARAX) 25 mg tablet  Patient-reported dosage and instructions? As needed  How many days do you have left? 14  Preferred Pharmacy? [facility]  Pharmacy phone number (if available)? [phone]  ---------------------------------------------------------------------------  --------------  CALL BACK INFO  What is the best way for the office to contact you? OK to leave message on   voicemail  Preferred Call Back Phone Number? [phone]  ---------------------------------------------------------------------------  --------------  SCRIPT ANSWERS  Relationship to Patient?  Self

## 2022-06-30 PROBLEM — R05.9 COUGH: Status: RESOLVED | Noted: 2022-05-31 | Resolved: 2022-06-30

## 2022-08-01 DIAGNOSIS — R00.0 TACHYCARDIA: ICD-10-CM

## 2022-08-01 DIAGNOSIS — E07.9 THYROID DISORDER: ICD-10-CM

## 2022-08-01 DIAGNOSIS — I10 ESSENTIAL HYPERTENSION: ICD-10-CM

## 2022-08-01 DIAGNOSIS — E78.5 DYSLIPIDEMIA: ICD-10-CM

## 2022-08-17 ENCOUNTER — VIRTUAL VISIT (OUTPATIENT)
Dept: FAMILY MEDICINE CLINIC | Age: 67
End: 2022-08-17
Payer: MEDICARE

## 2022-08-17 DIAGNOSIS — J32.9 CHRONIC SINUSITIS, UNSPECIFIED LOCATION: Primary | ICD-10-CM

## 2022-08-17 DIAGNOSIS — E78.5 DYSLIPIDEMIA: ICD-10-CM

## 2022-08-17 DIAGNOSIS — J45.20 MILD INTERMITTENT ASTHMA WITHOUT STATUS ASTHMATICUS WITHOUT COMPLICATION: ICD-10-CM

## 2022-08-17 DIAGNOSIS — F39 MOOD DISORDER (HCC): ICD-10-CM

## 2022-08-17 PROCEDURE — G8399 PT W/DXA RESULTS DOCUMENT: HCPCS | Performed by: FAMILY MEDICINE

## 2022-08-17 PROCEDURE — 3017F COLORECTAL CA SCREEN DOC REV: CPT | Performed by: FAMILY MEDICINE

## 2022-08-17 PROCEDURE — 99213 OFFICE O/P EST LOW 20 MIN: CPT | Performed by: FAMILY MEDICINE

## 2022-08-17 PROCEDURE — G8427 DOCREV CUR MEDS BY ELIG CLIN: HCPCS | Performed by: FAMILY MEDICINE

## 2022-08-17 PROCEDURE — 1123F ACP DISCUSS/DSCN MKR DOCD: CPT | Performed by: FAMILY MEDICINE

## 2022-08-17 PROCEDURE — G8756 NO BP MEASURE DOC: HCPCS | Performed by: FAMILY MEDICINE

## 2022-08-17 PROCEDURE — 1101F PT FALLS ASSESS-DOCD LE1/YR: CPT | Performed by: FAMILY MEDICINE

## 2022-08-17 PROCEDURE — G9899 SCRN MAM PERF RSLTS DOC: HCPCS | Performed by: FAMILY MEDICINE

## 2022-08-17 PROCEDURE — G8417 CALC BMI ABV UP PARAM F/U: HCPCS | Performed by: FAMILY MEDICINE

## 2022-08-17 PROCEDURE — G8510 SCR DEP NEG, NO PLAN REQD: HCPCS | Performed by: FAMILY MEDICINE

## 2022-08-17 PROCEDURE — 1090F PRES/ABSN URINE INCON ASSESS: CPT | Performed by: FAMILY MEDICINE

## 2022-08-17 PROCEDURE — G8536 NO DOC ELDER MAL SCRN: HCPCS | Performed by: FAMILY MEDICINE

## 2022-08-17 RX ORDER — MONTELUKAST SODIUM 10 MG/1
10 TABLET ORAL DAILY
Qty: 90 TABLET | Refills: 1 | Status: SHIPPED | OUTPATIENT
Start: 2022-08-17

## 2022-08-17 NOTE — PROGRESS NOTES
Eren García is a 75 y.o. female who was seen by synchronous (real-time) audio-video technology on 8/17/2022 for Sinus Infection and Facial Pain (Radiating down to neck x 1 week)        Assessment & Plan:       ICD-10-CM ICD-9-CM    1. Chronic sinusitis, unspecified location  J32.9 473.9 REFERRAL TO ENT-OTOLARYNGOLOGY      2. Dyslipidemia  E78.5 272.4       3. Mood disorder ([facility])  F39 296.90       4. Mild intermittent asthma without status asthmaticus without complication  U43.98 648.34         Subjective:   Eren García is seen for follow-up care. Sinusitis: Patient has chronic sinus infections. This has been longstanding for her. She was recently seen in urgent care for the same and put on antibiotics. Still has some congestion with postnasal drainage. Would like to see an ENT physician. She is in [place] and would like to have the ENT physician around that area. She requests referral.  This will be placed. She will let us know which particular physician she would like to see. In the meantime I will send in SingXapoir to take daily. She takes Claritin daily for allergies. She is also on Flonase. Dyslipidemia: Patient has dyslipidemia she is on atorvastatin. Stable on medication. She will continue to take a diet low in polysaturated fats. Asthma: Patient has a history of asthma. She uses inhaler medication. She is on [employer]. We will continue this medication. She also has albuterol to use as needed. Mood disorder: Patient has mood disorder. She is on Paxil for anxiety and depression. She also has hydroxyzine to use as needed for acute anxiety. Continue current treatment plan. Prior to Admission medications    Medication Sig Start Date End Date Taking? Authorizing Provider   hydrOXYzine HCL (ATARAX) 25 mg tablet Take 1 Tablet by mouth three (3) times daily as needed for Itching or Anxiety.  6/22/22  Yes Robbie Echavarria MD   PARoxetine (PAXIL) 10 mg tablet Take 1 Tablet by mouth daily. 6/22/22  Yes Robbie Echavarria MD   atorvastatin (LIPITOR) 10 mg tablet Take 1 Tablet by mouth daily. 6/22/22  Yes Robbie Echavarria MD   fluticasone propionate (FLONASE) 50 mcg/actuation nasal spray Use 2 spray(s) in each nostril once daily 4/28/22  Yes Mauro Mathis MD   Trulance 3 mg tab Take 3 mg by mouth daily. 3/15/21  Yes Provider, Historical   albuterol (PROVENTIL) 5 mg/mL nebulizer solution Take 2.5 mg by inhalation two (2) times a day. Yes Provider, Historical   umeclidinium-vilanteroL (ANORO ELLIPTA) 62.5-25 mcg/actuation inhaler Take 1 Puff by inhalation daily. Yes Provider, Historical   aspirin delayed-release 81 mg tablet 81 mg. Yes Provider, Historical   albuterol (PROVENTIL HFA, VENTOLIN HFA, PROAIR HFA) 90 mcg/actuation inhaler Take  by inhalation. Yes Provider, Historical   omeprazole (PRILOSEC) 20 mg capsule Take 20 mg by mouth daily. Yes Provider, Historical   methylPREDNISolone (MEDROL DOSEPACK) 4 mg tablet Take tapering dose as prescribed  Patient not taking: Reported on 8/17/2022 9/16/21   Robbie Echavarria MD   traZODone (DESYREL) 50 mg tablet Take 1 tablet by mouth nightly  Patient not taking: Reported on 8/17/2022 5/27/21   Robbie Echavarria MD   Oxygen 2LPM nc HS    Provider, Historical     ROS Review of all systems is negative except as noted above in the HPI.     Objective:     Patient-Reported Vitals 8/17/2022   Patient-Reported Weight 157   Constitutional: [x] Appears well-developed and well-nourished [x] No apparent distress      Mental status: [x] Alert and awake  [x] Oriented to person/place/time [x] Able to follow commands         HENT: [x] Normocephalic, atraumatic     Pulmonary/Chest: [x] Respiratory effort normal   [x] No visualized signs of difficulty breathing or respiratory distress            Neurological:        [x] No Facial Asymmetry (Cranial nerve 7 motor function) (limited exam due to video visit)                     Psychiatric:       [x] Normal Affect         We discussed the expected course, resolution and complications of the diagnosis(es) in detail. Medication risks, benefits, costs, interactions, and alternatives were discussed as indicated. I advised her to contact the office if her condition worsens, changes or fails to improve as anticipated. She expressed understanding with the diagnosis(es) and plan. Oliverio Shields, was evaluated through a synchronous (real-time) audio-video encounter. The patient (or guardian if applicable) is aware that this is a billable service, which includes applicable co-pays. This Virtual Visit was conducted with patient's (and/or legal guardian's) consent. The visit was conducted pursuant to the emergency declaration under the [address] [place] [address], [address] [place] [address] authority and the [employer] and [employer] General Act. Patient identification was verified, and a caregiver was present when appropriate. The patient was located at: Home: [address] [facility] [address] [facility]  The provider was located at:  Facility (Appt Department): [address] MD

## 2022-08-17 NOTE — PROGRESS NOTES
1. \"Have you been to the ER, urgent care clinic since your last visit? Hospitalized since your last visit? \" No    2. \"Have you seen or consulted any other health care providers outside of the [address] [place] [address] since your last visit? \" No     3. For patients aged 39-70: Has the patient had a colonoscopy / FIT/ Cologuard? Yes - no Care Gap present      If the patient is female:    4. For patients aged 41-77: Has the patient had a mammogram within the past 2 years? No      5. For patients aged 21-65: Has the patient had a pap smear?  Yes - no Care Gap present

## 2022-09-12 DIAGNOSIS — F41.9 ANXIETY: ICD-10-CM

## 2022-09-12 RX ORDER — HYDROXYZINE 25 MG/1
TABLET, FILM COATED ORAL
Qty: 60 TABLET | Refills: 0 | Status: SHIPPED | OUTPATIENT
Start: 2022-09-12 | End: 2022-10-24

## 2022-09-13 ENCOUNTER — TELEPHONE (OUTPATIENT)
Dept: FAMILY MEDICINE CLINIC | Age: 67
End: 2022-09-13

## 2022-09-13 DIAGNOSIS — J32.9 SINUSITIS, UNSPECIFIED CHRONICITY, UNSPECIFIED LOCATION: ICD-10-CM

## 2022-09-13 NOTE — TELEPHONE ENCOUNTER
This patient contacted office for the following prescriptions to be filled:    Last office visit: 8/17/22  Follow up appointment: 10/3/22   Medication requested :   Requested Prescriptions     Pending Prescriptions Disp Refills    fluticasone propionate (FLONASE) 50 mcg/actuation nasal spray 16 g 0   '  PCP: Dr. Leroy Merlos  Mail order or Local pharmacy name: [address]

## 2022-09-14 RX ORDER — FLUTICASONE PROPIONATE 50 MCG
SPRAY, SUSPENSION (ML) NASAL
Qty: 16 G | Refills: 0 | Status: SHIPPED | OUTPATIENT
Start: 2022-09-14 | End: 2022-10-03 | Stop reason: SDUPTHER

## 2022-09-14 NOTE — TELEPHONE ENCOUNTER
Spoke with patient and recommended it but not mandatory-  Patient wants to get the booster when she get her flu shot-requested patient make sure the pharmacy send us a copy,  patient verbalized understanding

## 2022-10-03 ENCOUNTER — VIRTUAL VISIT (OUTPATIENT)
Dept: FAMILY MEDICINE CLINIC | Age: 67
End: 2022-10-03
Payer: MEDICARE

## 2022-10-03 DIAGNOSIS — J45.20 MILD INTERMITTENT ASTHMA WITHOUT STATUS ASTHMATICUS WITHOUT COMPLICATION: ICD-10-CM

## 2022-10-03 DIAGNOSIS — K21.9 GASTROESOPHAGEAL REFLUX DISEASE, UNSPECIFIED WHETHER ESOPHAGITIS PRESENT: ICD-10-CM

## 2022-10-03 DIAGNOSIS — E78.5 DYSLIPIDEMIA: ICD-10-CM

## 2022-10-03 DIAGNOSIS — F39 MOOD DISORDER (HCC): ICD-10-CM

## 2022-10-03 DIAGNOSIS — J32.9 SINUSITIS, UNSPECIFIED CHRONICITY, UNSPECIFIED LOCATION: Primary | ICD-10-CM

## 2022-10-03 PROCEDURE — 1090F PRES/ABSN URINE INCON ASSESS: CPT | Performed by: FAMILY MEDICINE

## 2022-10-03 PROCEDURE — G8536 NO DOC ELDER MAL SCRN: HCPCS | Performed by: FAMILY MEDICINE

## 2022-10-03 PROCEDURE — G8417 CALC BMI ABV UP PARAM F/U: HCPCS | Performed by: FAMILY MEDICINE

## 2022-10-03 PROCEDURE — G9899 SCRN MAM PERF RSLTS DOC: HCPCS | Performed by: FAMILY MEDICINE

## 2022-10-03 PROCEDURE — 1101F PT FALLS ASSESS-DOCD LE1/YR: CPT | Performed by: FAMILY MEDICINE

## 2022-10-03 PROCEDURE — 3017F COLORECTAL CA SCREEN DOC REV: CPT | Performed by: FAMILY MEDICINE

## 2022-10-03 PROCEDURE — G8399 PT W/DXA RESULTS DOCUMENT: HCPCS | Performed by: FAMILY MEDICINE

## 2022-10-03 PROCEDURE — G8756 NO BP MEASURE DOC: HCPCS | Performed by: FAMILY MEDICINE

## 2022-10-03 PROCEDURE — G8427 DOCREV CUR MEDS BY ELIG CLIN: HCPCS | Performed by: FAMILY MEDICINE

## 2022-10-03 PROCEDURE — G8510 SCR DEP NEG, NO PLAN REQD: HCPCS | Performed by: FAMILY MEDICINE

## 2022-10-03 PROCEDURE — 99214 OFFICE O/P EST MOD 30 MIN: CPT | Performed by: FAMILY MEDICINE

## 2022-10-03 PROCEDURE — 1123F ACP DISCUSS/DSCN MKR DOCD: CPT | Performed by: FAMILY MEDICINE

## 2022-10-03 RX ORDER — FLUTICASONE PROPIONATE 50 MCG
SPRAY, SUSPENSION (ML) NASAL
Qty: 16 G | Refills: 0 | Status: SHIPPED | OUTPATIENT
Start: 2022-10-03

## 2022-10-03 NOTE — PROGRESS NOTES
Buddy Kramer is a 75 y.o. female who was seen by synchronous (real-time) audio-video technology on 10/3/2022 for Follow Up Chronic Condition        Assessment & Plan:       ICD-10-CM ICD-9-CM    1. Sinusitis, unspecified chronicity, unspecified location  J32.9 473.9 fluticasone propionate (FLONASE) 50 mcg/actuation nasal spray      2. Dyslipidemia  E78.5 272.4       3. Mood disorder ([facility])  F39 296.90       4. Mild intermittent asthma without status asthmaticus without complication  H53.97 128.11       5. Gastroesophageal reflux disease, unspecified whether esophagitis present  K21.9 530.81         Subjective:   Buddy Kramer is seen for follow-up care. Sinus congestion: Patient has sinus congestion. She gets sneezing and nasal drainage. She has used Flonase in the past with good result. Would like a refill of medication. She is scheduled to have a CT scan done of her sinuses and she is being followed up by the ENT specialist.  Prescription will be sent in. Dyslipidemia: Patient has dyslipidemia. She takes atorvastatin. We will recheck lipid panel at next visit. She will continue current treatment plan. GERD: Patient has gastroesophageal reflux disease. Gets heartburn on and off. She is on omeprazole. Denies dark stools or hematemesis. Asthma: Patient has a history of asthma. Has been followed up by the pulmonologist.  She has occasional cough and wheeze. She is on Anoro Ellipta and has albuterol to use as needed. She will continue with these medications. Mood disorder: Patient has history of mood disorder. She is on Paxil and has hydroxyzine to use as needed for anxiety. She will continue current treatment plan. Prior to Admission medications    Medication Sig Start Date End Date Taking?  Authorizing Provider   fluticasone propionate (FLONASE) 50 mcg/actuation nasal spray Use 2 spray(s) in each nostril once daily 10/3/22  Yes Robbie Echavarria MD   hydrOXYzine HCL (ATARAX) 25 mg tablet TAKE 1 TABLET BY MOUTH THREE TIMES DAILY AS NEEDED FOR  ITCHING  OR  ANXIETY 9/12/22  Yes TheRobbie Bundy MD   montelukast (SINGULAIR) 10 mg tablet Take 1 Tablet by mouth daily. 8/17/22  Yes Robbie Echavarria MD   atorvastatin (LIPITOR) 10 mg tablet Take 1 Tablet by mouth daily. 6/22/22  Yes Rbobie Echavarria MD   Trulance 3 mg tab Take 3 mg by mouth daily. 3/15/21  Yes Provider, Historical   albuterol (PROVENTIL) 5 mg/mL nebulizer solution Take 2.5 mg by inhalation two (2) times a day. Yes Provider, Historical   umeclidinium-vilanteroL (ANORO ELLIPTA) 62.5-25 mcg/actuation inhaler Take 1 Puff by inhalation daily. Yes Provider, Historical   aspirin delayed-release 81 mg tablet 81 mg. Yes Provider, Historical   albuterol (PROVENTIL HFA, VENTOLIN HFA, PROAIR HFA) 90 mcg/actuation inhaler Take  by inhalation. Yes Provider, Historical   omeprazole (PRILOSEC) 20 mg capsule Take 20 mg by mouth daily. Yes Provider, Historical   fluticasone propionate (FLONASE) 50 mcg/actuation nasal spray Use 2 spray(s) in each nostril once daily 9/14/22 10/3/22  Robbie Echavarria MD   PARoxetine (PAXIL) 10 mg tablet Take 1 Tablet by mouth daily. 6/22/22   Robbei Echavarria MD   Oxygen 2LPM nc HS    Provider, Historical     ROS Review of all systems is negative except as noted above in the HPI.     Objective:     Patient-Reported Vitals 10/3/2022   Patient-Reported Weight 161   Constitutional: [x] Appears well-developed and well-nourished [x] No apparent distress      Mental status: [x] Alert and awake  [x] Oriented to person/place/time [x] Able to follow commands         HENT: [x] Normocephalic, atraumatic     Pulmonary/Chest: [x] Respiratory effort normal   [x] No visualized signs of difficulty breathing or respiratory distress            Neurological:        [x] No Facial Asymmetry (Cranial nerve 7 motor function) (limited exam due to video visit)                    Psychiatric:       [x] Normal Affect       We discussed the expected course, resolution and complications of the diagnosis(es) in detail. Medication risks, benefits, costs, interactions, and alternatives were discussed as indicated. I advised her to contact the office if her condition worsens, changes or fails to improve as anticipated. She expressed understanding with the diagnosis(es) and plan. Jl Mendoza, was evaluated through a synchronous (real-time) audio-video encounter. The patient (or guardian if applicable) is aware that this is a billable service, which includes applicable co-pays. This Virtual Visit was conducted with patient's (and/or legal guardian's) consent. The visit was conducted pursuant to the emergency declaration under the [address] [place] [address] authority and the American Board of Addiction Medicine (ABAM) and [employer] General Act. Patient identification was verified, and a caregiver was present when appropriate. The patient was located at: Home: [address] [facility] [address] [facility]  The provider was located at:  Facility (Appt Department): [address] MD

## 2022-10-03 NOTE — PROGRESS NOTES
1. \"Have you been to the ER, urgent care clinic since your last visit? Hospitalized since your last visit? \" No    2. \"Have you seen or consulted any other health care providers outside of the [address] [place] [address] since your last visit? \" No     3. For patients aged 39-70: Has the patient had a colonoscopy / FIT/ Cologuard? Yes - no Care Gap present      If the patient is female:    4. For patients aged 41-77: Has the patient had a mammogram within the past 2 years? NA - based on age or sex      11. For patients aged 21-65: Has the patient had a pap smear?  Yes - no Care Gap present

## 2022-10-23 DIAGNOSIS — F41.9 ANXIETY: ICD-10-CM

## 2022-10-24 DIAGNOSIS — F41.9 ANXIETY: ICD-10-CM

## 2022-10-24 RX ORDER — HYDROXYZINE 25 MG/1
TABLET, FILM COATED ORAL
Qty: 60 TABLET | Refills: 0 | Status: SHIPPED | OUTPATIENT
Start: 2022-10-24 | End: 2022-11-23

## 2022-10-24 RX ORDER — HYDROXYZINE 25 MG/1
TABLET, FILM COATED ORAL
Qty: 60 TABLET | Refills: 0 | OUTPATIENT
Start: 2022-10-24

## 2022-10-24 NOTE — TELEPHONE ENCOUNTER
Requested Prescriptions     Pending Prescriptions Disp Refills    hydrOXYzine HCL (ATARAX) 25 mg tablet 60 Tablet 0

## 2022-10-25 ENCOUNTER — TELEPHONE (OUTPATIENT)
Dept: FAMILY MEDICINE CLINIC | Age: 67
End: 2022-10-25

## 2022-10-25 NOTE — TELEPHONE ENCOUNTER
Spoke with patient and advised medication was sent to the pharmacy yesterday. Advised to call the pharmacy. Patient verbalized understanding    ----- Message from [address] sent at 10/25/2022 11:31 AM EDT -----  Subject: Refill Request    QUESTIONS  Name of Medication? hydrOXYzine HCL (ATARAX) 25 mg tablet  Patient-reported dosage and instructions? 25 mg 1 X daily or as needed  How many days do you have left? 0  Preferred Pharmacy? [facility]  Pharmacy phone number (if available)? [phone]  Additional Information for Provider? [facility] is waiting on the medication,   Please fill as soon as possible. Thank you  ---------------------------------------------------------------------------  --------------  CALL BACK INFO  What is the best way for the office to contact you? OK to leave message on   voicemail  Preferred Call Back Phone Number? [phone]  ---------------------------------------------------------------------------  --------------  SCRIPT ANSWERS  Relationship to Patient?  Self

## 2022-11-18 NOTE — ANESTHESIA POSTPROCEDURE EVALUATION
Procedure(s):  UPPER ENDOSCOPY w/bxs and dilation 48 fr molina  COLONOSCOPY.     MAC    Anesthesia Post Evaluation      Multimodal analgesia: multimodal analgesia used between 6 hours prior to anesthesia start to PACU discharge  Patient location during evaluation: bedside  Patient participation: complete - patient participated  Level of consciousness: awake  Pain management: adequate  Airway patency: patent  Anesthetic complications: no  Cardiovascular status: stable  Respiratory status: acceptable  Hydration status: acceptable  Post anesthesia nausea and vomiting:  controlled  Final Post Anesthesia Temperature Assessment:  Normothermia (36.0-37.5 degrees C)      INITIAL Post-op Vital signs:   Vitals Value Taken Time   /67 09/02/21 1209   Temp 36.8 °C (98.3 °F) 09/02/21 1209   Pulse 101 09/02/21 1209   Resp 16 09/02/21 1209   SpO2 100 % 09/02/21 1209 18-Nov-2022 13:51

## 2022-11-22 DIAGNOSIS — J32.9 SINUSITIS, UNSPECIFIED CHRONICITY, UNSPECIFIED LOCATION: ICD-10-CM

## 2022-11-22 DIAGNOSIS — F41.9 ANXIETY: ICD-10-CM

## 2022-11-23 RX ORDER — FLUTICASONE PROPIONATE 50 MCG
SPRAY, SUSPENSION (ML) NASAL
Qty: 16 G | Refills: 0 | Status: SHIPPED | OUTPATIENT
Start: 2022-11-23

## 2022-11-23 RX ORDER — HYDROXYZINE 25 MG/1
TABLET, FILM COATED ORAL
Qty: 60 TABLET | Refills: 0 | Status: SHIPPED | OUTPATIENT
Start: 2022-11-23

## 2022-12-14 LAB
CREATININE, EXTERNAL: 0.8
HBA1C MFR BLD HPLC: 5.8 %

## 2023-01-18 DIAGNOSIS — N95.1 MENOPAUSAL AND FEMALE CLIMACTERIC STATES: ICD-10-CM

## 2023-01-18 DIAGNOSIS — J32.9 SINUSITIS, UNSPECIFIED CHRONICITY, UNSPECIFIED LOCATION: ICD-10-CM

## 2023-01-19 RX ORDER — FLUTICASONE PROPIONATE 50 MCG
SPRAY, SUSPENSION (ML) NASAL
Qty: 16 G | Refills: 0 | Status: SHIPPED | OUTPATIENT
Start: 2023-01-19

## 2023-01-19 RX ORDER — PAROXETINE 10 MG/1
TABLET, FILM COATED ORAL
Qty: 90 TABLET | Refills: 0 | Status: SHIPPED | OUTPATIENT
Start: 2023-01-19

## 2023-01-31 DIAGNOSIS — F41.9 ANXIETY: ICD-10-CM

## 2023-01-31 NOTE — TELEPHONE ENCOUNTER
----- Message from Sanjuana Greenfield sent at 1/31/2023 10:27 AM EST -----  Subject: Refill Request    QUESTIONS  Name of Medication? hydrOXYzine HCl (ATARAX) 25 MG tablet  Patient-reported dosage and instructions? one tablet as needed   How many days do you have left? 0  Preferred Pharmacy? [address] [facility]  Pharmacy phone number (if available)? [phone]  ---------------------------------------------------------------------------  --------------,  Name of Medication? Other - Trulnace 3Mg  Patient-reported dosage and instructions? one daily as needed   How many days do you have left? 0  Preferred Pharmacy? [address] [facility]  Pharmacy phone number (if available)? [phone]  ---------------------------------------------------------------------------  --------------,  Name of Medication? Other - Ibuprofen 250MG  Patient-reported dosage and instructions? as needed   How many days do you have left? 0  Preferred Pharmacy? [address] [facility]  Pharmacy phone number (if available)? [phone]  ---------------------------------------------------------------------------  --------------  CALL BACK INFO  What is the best way for the office to contact you? OK to leave message on   voicemail  Preferred Call Back Phone Number? [phone]  ---------------------------------------------------------------------------  --------------  SCRIPT ANSWERS  Relationship to Patient?  Self

## 2023-02-01 RX ORDER — HYDROXYZINE 25 MG/1
TABLET, FILM COATED ORAL
Qty: 60 TABLET | Refills: 1 | Status: SHIPPED | OUTPATIENT
Start: 2023-02-01

## 2023-02-01 RX ORDER — PLECANATIDE 3 MG/1
3 TABLET ORAL DAILY
Qty: 30 TABLET | Refills: 1 | Status: SHIPPED | OUTPATIENT
Start: 2023-02-01

## 2023-02-27 NOTE — TELEPHONE ENCOUNTER
----- Message from Elba Jain sent at 2/23/2023 10:54 AM EST -----  Subject: Refill Request    QUESTIONS  Name of Medication? fluticasone (FLONASE) 50 MCG/ACT nasal spray  Patient-reported dosage and instructions? 50 MCG/ACT take once a day   How many days do you have left? 2  Preferred Pharmacy? [address] [facility]  Pharmacy phone number (if available)? [phone]  ---------------------------------------------------------------------------  --------------,  Name of Medication? PARoxetine (PAXIL) 10 MG tablet  Patient-reported dosage and instructions? 10 mg take once a day  How many days do you have left? 2  Preferred Pharmacy? [address] [facility]  Pharmacy phone number (if available)? [phone]  ---------------------------------------------------------------------------  --------------  CALL BACK INFO  What is the best way for the office to contact you? OK to leave message on   voicemail  Preferred Call Back Phone Number? [phone]  ---------------------------------------------------------------------------  --------------  SCRIPT ANSWERS  Relationship to Patient?  Self

## 2023-03-02 RX ORDER — PAROXETINE 10 MG/1
10 TABLET, FILM COATED ORAL DAILY
Qty: 90 TABLET | Refills: 1 | Status: SHIPPED | OUTPATIENT
Start: 2023-03-02

## 2023-03-02 RX ORDER — FLUTICASONE PROPIONATE 50 MCG
SPRAY, SUSPENSION (ML) NASAL
Qty: 16 G | Refills: 1 | Status: SHIPPED | OUTPATIENT
Start: 2023-03-02

## 2023-03-06 ENCOUNTER — TELEPHONE (OUTPATIENT)
Facility: CLINIC | Age: 68
End: 2023-03-06

## 2023-07-20 ENCOUNTER — CARE COORDINATION (OUTPATIENT)
Facility: CLINIC | Age: 68
End: 2023-07-20

## 2023-07-20 ASSESSMENT — PATIENT HEALTH QUESTIONNAIRE - PHQ9
SUM OF ALL RESPONSES TO PHQ QUESTIONS 1-9: 0

## 2023-07-21 ENCOUNTER — CARE COORDINATION (OUTPATIENT)
Facility: CLINIC | Age: 68
End: 2023-07-21

## 2023-07-21 NOTE — CARE COORDINATION
Returned call to Patient following VM left by Patient. Attempted to contact patient for CCM encounter / follow up. No answer. Message left with ACM contact information provided. Will attempt to contact at a later time.

## 2023-07-24 ENCOUNTER — CARE COORDINATION (OUTPATIENT)
Facility: CLINIC | Age: 68
End: 2023-07-24

## 2023-07-24 NOTE — CARE COORDINATION
Received call from Patient stating that she did not want further calls from [employer] regarding CCM. Patient has AC contact info if needed    Case resolved and no further encounter planned.     Letter sent

## 2023-07-26 RX ORDER — AZITHROMYCIN 250 MG/1
TABLET, FILM COATED ORAL
Qty: 6 TABLET | Refills: 0 | OUTPATIENT
Start: 2023-07-26

## 2023-10-10 RX ORDER — PAROXETINE 10 MG/1
10 TABLET, FILM COATED ORAL DAILY
Qty: 90 TABLET | Refills: 0 | Status: SHIPPED | OUTPATIENT
Start: 2023-10-10

## 2023-11-13 RX ORDER — FLUTICASONE PROPIONATE 50 MCG
SPRAY, SUSPENSION (ML) NASAL
Qty: 16 G | Refills: 0 | Status: SHIPPED | OUTPATIENT
Start: 2023-11-13

## 2023-11-29 RX ORDER — PLECANATIDE 3 MG/1
1 TABLET ORAL DAILY
Qty: 30 TABLET | Refills: 0 | OUTPATIENT
Start: 2023-11-29

## 2024-01-08 RX ORDER — FLUTICASONE PROPIONATE 50 MCG
SPRAY, SUSPENSION (ML) NASAL
Qty: 16 G | Refills: 0 | Status: SHIPPED | OUTPATIENT
Start: 2024-01-08

## 2024-02-23 RX ORDER — PAROXETINE 10 MG/1
10 TABLET, FILM COATED ORAL DAILY
Qty: 90 TABLET | Refills: 0 | OUTPATIENT
Start: 2024-02-23

## 2024-02-23 RX ORDER — FLUTICASONE PROPIONATE 50 MCG
SPRAY, SUSPENSION (ML) NASAL
Qty: 16 G | Refills: 0 | OUTPATIENT
Start: 2024-02-23

## 2024-02-23 NOTE — TELEPHONE ENCOUNTER
Patient is seeing internal medicine at [facility] and is getting her medications from that office.

## 2024-06-13 RX ORDER — PLECANATIDE 3 MG/1
1 TABLET ORAL DAILY
Qty: 30 TABLET | Refills: 0 | OUTPATIENT
Start: 2024-06-13

## (undated) DEVICE — FLEX ADVANTAGE 3000CC: Brand: FLEX ADVANTAGE

## (undated) DEVICE — FLUFF AND POLYMER UNDERPAD,EXTRA HEAVY: Brand: WINGS

## (undated) DEVICE — MEDI-VAC NON-CONDUCTIVE SUCTION TUBING: Brand: CARDINAL HEALTH

## (undated) DEVICE — SYRINGE MED 25GA 3ML L5/8IN SUBQ PLAS W/ DETACH NDL SFTY

## (undated) DEVICE — STERILE POLYISOPRENE POWDER-FREE SURGICAL GLOVES: Brand: PROTEXIS

## (undated) DEVICE — CANNULA ORIG TL CLR W FOAM CUSHIONS AND 14FT SUPL TB 3 CHN

## (undated) DEVICE — FORCEPS BX L240CM JAW DIA2.8MM L CAP W/ NDL MIC MESH TOOTH

## (undated) DEVICE — GOWN ISOL IMPERV UNIV, DISP, OPEN BACK, BLUE --

## (undated) DEVICE — SYR 10ML LUER LOK 1/5ML GRAD --

## (undated) DEVICE — MEDI-VAC SUCTION HIGH CAPACITY: Brand: CARDINAL HEALTH

## (undated) DEVICE — SOLUTION IRRIG 1000ML H2O STRL BLT

## (undated) DEVICE — BASIN EMESIS 500CC ROSE 250/CS 60/PLT: Brand: MEDEGEN MEDICAL PRODUCTS, LLC

## (undated) DEVICE — ENDOSCOPY PUMP TUBING/ CAP SET: Brand: ERBE

## (undated) DEVICE — SYR 50ML SLIP TIP NSAF LF STRL --

## (undated) DEVICE — SYR 20ML LL STRL LF --

## (undated) DEVICE — BITE BLOCK ENDOSCP UNIV AD 6 TO 9.4 MM

## (undated) DEVICE — AIRLIFE™ NASAL OXYGEN CANNULA CURVED, FLARED TIP WITH 14 FOOT (4.3 M) CRUSH-RESISTANT TUBING, OVER-THE-EAR STYLE: Brand: AIRLIFE™

## (undated) DEVICE — AIRLIFE™ NASAL OXYGEN CANNULA CURVED, NONFLARED TIP WITH 14 FOOT (4.3 M) CRUSH-RESISTANT TUBING, OVER-THE-EAR STYLE: Brand: AIRLIFE™

## (undated) DEVICE — GAUZE,SPONGE,4"X4",16PLY,STRL,LF,10/TRAY: Brand: MEDLINE

## (undated) DEVICE — CATHETER SUCT TR FL TIP 14FR W/ O CTRL